# Patient Record
Sex: FEMALE | Race: OTHER | NOT HISPANIC OR LATINO | URBAN - METROPOLITAN AREA
[De-identification: names, ages, dates, MRNs, and addresses within clinical notes are randomized per-mention and may not be internally consistent; named-entity substitution may affect disease eponyms.]

---

## 2017-11-28 ENCOUNTER — EMERGENCY (EMERGENCY)
Facility: HOSPITAL | Age: 57
LOS: 1 days | Discharge: HOME | End: 2017-11-28
Attending: EMERGENCY MEDICINE | Admitting: EMERGENCY MEDICINE
Payer: COMMERCIAL

## 2017-11-28 VITALS
WEIGHT: 169.98 LBS | OXYGEN SATURATION: 98 % | RESPIRATION RATE: 18 BRPM | TEMPERATURE: 99 F | DIASTOLIC BLOOD PRESSURE: 92 MMHG | SYSTOLIC BLOOD PRESSURE: 164 MMHG | HEART RATE: 98 BPM

## 2017-11-28 LAB
ALBUMIN SERPL ELPH-MCNC: 4.4 G/DL — SIGNIFICANT CHANGE UP (ref 3.3–5)
ALP SERPL-CCNC: 99 U/L — SIGNIFICANT CHANGE UP (ref 40–120)
ALT FLD-CCNC: 27 U/L RC — SIGNIFICANT CHANGE UP (ref 10–45)
ANION GAP SERPL CALC-SCNC: 15 MMOL/L — SIGNIFICANT CHANGE UP (ref 5–17)
APTT BLD: 32.6 SEC — SIGNIFICANT CHANGE UP (ref 27.5–37.4)
AST SERPL-CCNC: 9 U/L — LOW (ref 10–40)
BASE EXCESS BLDV CALC-SCNC: 1.4 MMOL/L — SIGNIFICANT CHANGE UP (ref -2–2)
BASOPHILS # BLD AUTO: 0.1 K/UL — SIGNIFICANT CHANGE UP (ref 0–0.2)
BASOPHILS NFR BLD AUTO: 1.5 % — SIGNIFICANT CHANGE UP (ref 0–2)
BILIRUB SERPL-MCNC: 0.1 MG/DL — LOW (ref 0.2–1.2)
BUN SERPL-MCNC: 12 MG/DL — SIGNIFICANT CHANGE UP (ref 7–23)
CA-I SERPL-SCNC: 1.17 MMOL/L — SIGNIFICANT CHANGE UP (ref 1.12–1.3)
CALCIUM SERPL-MCNC: 9 MG/DL — SIGNIFICANT CHANGE UP (ref 8.4–10.5)
CHLORIDE BLDV-SCNC: 106 MMOL/L — SIGNIFICANT CHANGE UP (ref 96–108)
CHLORIDE SERPL-SCNC: 101 MMOL/L — SIGNIFICANT CHANGE UP (ref 96–108)
CK MB BLD-MCNC: 2.5 % — SIGNIFICANT CHANGE UP (ref 0–3.5)
CK MB CFR SERPL CALC: 2.9 NG/ML — SIGNIFICANT CHANGE UP (ref 0–3.8)
CK SERPL-CCNC: 117 U/L — SIGNIFICANT CHANGE UP (ref 25–170)
CO2 BLDV-SCNC: 28 MMOL/L — SIGNIFICANT CHANGE UP (ref 22–30)
CO2 SERPL-SCNC: 23 MMOL/L — SIGNIFICANT CHANGE UP (ref 22–31)
CREAT SERPL-MCNC: 0.66 MG/DL — SIGNIFICANT CHANGE UP (ref 0.5–1.3)
EOSINOPHIL # BLD AUTO: 0.2 K/UL — SIGNIFICANT CHANGE UP (ref 0–0.5)
EOSINOPHIL NFR BLD AUTO: 2.1 % — SIGNIFICANT CHANGE UP (ref 0–6)
GAS PNL BLDV: 137 MMOL/L — SIGNIFICANT CHANGE UP (ref 136–145)
GAS PNL BLDV: SIGNIFICANT CHANGE UP
GLUCOSE BLDV-MCNC: 204 MG/DL — HIGH (ref 70–99)
GLUCOSE SERPL-MCNC: 217 MG/DL — HIGH (ref 70–99)
HCO3 BLDV-SCNC: 26 MMOL/L — SIGNIFICANT CHANGE UP (ref 21–29)
HCT VFR BLD CALC: 39 % — SIGNIFICANT CHANGE UP (ref 34.5–45)
HCT VFR BLDA CALC: 41 % — SIGNIFICANT CHANGE UP (ref 39–50)
HGB BLD CALC-MCNC: 13.4 G/DL — SIGNIFICANT CHANGE UP (ref 11.5–15.5)
HGB BLD-MCNC: 13.7 G/DL — SIGNIFICANT CHANGE UP (ref 11.5–15.5)
INR BLD: 1.03 RATIO — SIGNIFICANT CHANGE UP (ref 0.88–1.16)
LACTATE BLDV-MCNC: 2.6 MMOL/L — HIGH (ref 0.7–2)
LYMPHOCYTES # BLD AUTO: 3.3 K/UL — SIGNIFICANT CHANGE UP (ref 1–3.3)
LYMPHOCYTES # BLD AUTO: 42.6 % — SIGNIFICANT CHANGE UP (ref 13–44)
MCHC RBC-ENTMCNC: 32.5 PG — SIGNIFICANT CHANGE UP (ref 27–34)
MCHC RBC-ENTMCNC: 35.1 GM/DL — SIGNIFICANT CHANGE UP (ref 32–36)
MCV RBC AUTO: 92.6 FL — SIGNIFICANT CHANGE UP (ref 80–100)
MONOCYTES # BLD AUTO: 0.6 K/UL — SIGNIFICANT CHANGE UP (ref 0–0.9)
MONOCYTES NFR BLD AUTO: 7.8 % — SIGNIFICANT CHANGE UP (ref 2–14)
NEUTROPHILS # BLD AUTO: 3.6 K/UL — SIGNIFICANT CHANGE UP (ref 1.8–7.4)
NEUTROPHILS NFR BLD AUTO: 46.1 % — SIGNIFICANT CHANGE UP (ref 43–77)
OTHER CELLS CSF MANUAL: 15 ML/DL — LOW (ref 18–22)
PCO2 BLDV: 45 MMHG — SIGNIFICANT CHANGE UP (ref 35–50)
PH BLDV: 7.39 — SIGNIFICANT CHANGE UP (ref 7.35–7.45)
PLATELET # BLD AUTO: 224 K/UL — SIGNIFICANT CHANGE UP (ref 150–400)
PO2 BLDV: 52 MMHG — HIGH (ref 25–45)
POTASSIUM BLDV-SCNC: 3.7 MMOL/L — SIGNIFICANT CHANGE UP (ref 3.5–5)
POTASSIUM SERPL-MCNC: 3.7 MMOL/L — SIGNIFICANT CHANGE UP (ref 3.5–5.3)
POTASSIUM SERPL-SCNC: 3.7 MMOL/L — SIGNIFICANT CHANGE UP (ref 3.5–5.3)
PROT SERPL-MCNC: 7.1 G/DL — SIGNIFICANT CHANGE UP (ref 6–8.3)
PROTHROM AB SERPL-ACNC: 11.2 SEC — SIGNIFICANT CHANGE UP (ref 9.8–12.7)
RAPID RVP RESULT: SIGNIFICANT CHANGE UP
RBC # BLD: 4.21 M/UL — SIGNIFICANT CHANGE UP (ref 3.8–5.2)
RBC # FLD: 11.9 % — SIGNIFICANT CHANGE UP (ref 10.3–14.5)
SAO2 % BLDV: 84 % — SIGNIFICANT CHANGE UP (ref 67–88)
SODIUM SERPL-SCNC: 139 MMOL/L — SIGNIFICANT CHANGE UP (ref 135–145)
TROPONIN T SERPL-MCNC: <0.01 NG/ML — SIGNIFICANT CHANGE UP (ref 0–0.06)
WBC # BLD: 7.8 K/UL — SIGNIFICANT CHANGE UP (ref 3.8–10.5)
WBC # FLD AUTO: 7.8 K/UL — SIGNIFICANT CHANGE UP (ref 3.8–10.5)

## 2017-11-28 PROCEDURE — 93010 ELECTROCARDIOGRAM REPORT: CPT

## 2017-11-28 PROCEDURE — 71260 CT THORAX DX C+: CPT | Mod: 26

## 2017-11-28 PROCEDURE — 99285 EMERGENCY DEPT VISIT HI MDM: CPT | Mod: 25

## 2017-11-28 RX ORDER — FAMOTIDINE 10 MG/ML
20 INJECTION INTRAVENOUS ONCE
Qty: 0 | Refills: 0 | Status: COMPLETED | OUTPATIENT
Start: 2017-11-28 | End: 2017-11-28

## 2017-11-28 RX ORDER — LIDOCAINE 4 G/100G
10 CREAM TOPICAL ONCE
Qty: 0 | Refills: 0 | Status: COMPLETED | OUTPATIENT
Start: 2017-11-28 | End: 2017-11-28

## 2017-11-28 RX ORDER — CEFTRIAXONE 500 MG/1
1 INJECTION, POWDER, FOR SOLUTION INTRAMUSCULAR; INTRAVENOUS ONCE
Qty: 0 | Refills: 0 | Status: COMPLETED | OUTPATIENT
Start: 2017-11-28 | End: 2017-11-28

## 2017-11-28 RX ORDER — AZITHROMYCIN 500 MG/1
500 TABLET, FILM COATED ORAL ONCE
Qty: 0 | Refills: 0 | Status: COMPLETED | OUTPATIENT
Start: 2017-11-28 | End: 2017-11-28

## 2017-11-28 RX ORDER — SODIUM CHLORIDE 9 MG/ML
1000 INJECTION INTRAMUSCULAR; INTRAVENOUS; SUBCUTANEOUS ONCE
Qty: 0 | Refills: 0 | Status: COMPLETED | OUTPATIENT
Start: 2017-11-28 | End: 2017-11-28

## 2017-11-28 RX ADMIN — FAMOTIDINE 20 MILLIGRAM(S): 10 INJECTION INTRAVENOUS at 22:07

## 2017-11-28 RX ADMIN — Medication 30 MILLILITER(S): at 22:07

## 2017-11-28 RX ADMIN — LIDOCAINE 10 MILLILITER(S): 4 CREAM TOPICAL at 22:06

## 2017-11-28 RX ADMIN — SODIUM CHLORIDE 1000 MILLILITER(S): 9 INJECTION INTRAMUSCULAR; INTRAVENOUS; SUBCUTANEOUS at 22:00

## 2017-11-28 NOTE — ED PROVIDER NOTE - PROGRESS NOTE DETAILS
paged Dr Fields. - Lian Carmona PA-C paged Dr Fields. -Lian Carmona PA-C spoke with taylor who requests admission to hospitalists and call back with ct results on cell [phone] attending Marina: Ct read negative. Discussed with PMD. Will dc with strict return precautions.

## 2017-11-28 NOTE — ED ADULT NURSE NOTE - OBJECTIVE STATEMENT
57 y/o F, no PMH, recently traveled to [facility], presents to ED ambulatory with family for abnormal CXR. Pt reports flu-like sx x 1 week with fevers, cough, runny nose, pt reports cough has been persistent nonproductive, pt now c/o mid sternal/mid upper back pain, 57 y/o F, no PMH, recently traveled to [facility], presents to ED ambulatory with family for abnormal CXR. Pt reports flu-like sx x 1 week with fevers, cough, runny nose, pt reports cough has been persistent nonproductive, pt now c/o mid sternal/mid upper back pain, feels constant and "burning" since Saturday. Pt saw PCP today and got CXR, pt reports CXR showed "mass on apex." Pt reports intermittent SOB. Pt denies headache, dizziness, palpitations, abdominal pain, n/v/d at this time. Family at bedside, safety maintained.

## 2017-11-28 NOTE — ED PROVIDER NOTE - OBJECTIVE STATEMENT
55 y/o otherwise healthy female sent in from PMD for abnormal chest xray with 3 cm apical mass. patient had flu like symptoms last week and reports she went to see PMD for persistent right sided constant chest "burning" and belching. pain is constant. no sob. no fever. no hx lung disease. nonsmoker. no recent travel although patient is from [facility]. no known TB exposure. no weight loss or night sweats. 55 y/o otherwise healthy female sent in from PMD for abnormal chest xray with 3 cm R apical infiltrate vs? mass. patient had flu like symptoms last week and reports she went to see PMD for persistent right sided constant chest "burning" and belching. pain is constant. no sob. no fever. no hx lung disease. nonsmoker. no recent travel although patient is from [facility]. no known TB exposure. no weight loss or night sweats.

## 2017-11-29 VITALS
SYSTOLIC BLOOD PRESSURE: 131 MMHG | OXYGEN SATURATION: 100 % | TEMPERATURE: 98 F | DIASTOLIC BLOOD PRESSURE: 78 MMHG | RESPIRATION RATE: 20 BRPM | HEART RATE: 90 BPM

## 2017-11-29 PROCEDURE — 82803 BLOOD GASES ANY COMBINATION: CPT

## 2017-11-29 PROCEDURE — 84132 ASSAY OF SERUM POTASSIUM: CPT

## 2017-11-29 PROCEDURE — 87581 M.PNEUMON DNA AMP PROBE: CPT

## 2017-11-29 PROCEDURE — 82553 CREATINE MB FRACTION: CPT

## 2017-11-29 PROCEDURE — 96375 TX/PRO/DX INJ NEW DRUG ADDON: CPT | Mod: XU

## 2017-11-29 PROCEDURE — 85730 THROMBOPLASTIN TIME PARTIAL: CPT

## 2017-11-29 PROCEDURE — 84295 ASSAY OF SERUM SODIUM: CPT

## 2017-11-29 PROCEDURE — 87633 RESP VIRUS 12-25 TARGETS: CPT

## 2017-11-29 PROCEDURE — 82330 ASSAY OF CALCIUM: CPT

## 2017-11-29 PROCEDURE — 96374 THER/PROPH/DIAG INJ IV PUSH: CPT | Mod: XU

## 2017-11-29 PROCEDURE — 83605 ASSAY OF LACTIC ACID: CPT

## 2017-11-29 PROCEDURE — 85027 COMPLETE CBC AUTOMATED: CPT

## 2017-11-29 PROCEDURE — 85610 PROTHROMBIN TIME: CPT

## 2017-11-29 PROCEDURE — 87486 CHLMYD PNEUM DNA AMP PROBE: CPT

## 2017-11-29 PROCEDURE — 87798 DETECT AGENT NOS DNA AMP: CPT

## 2017-11-29 PROCEDURE — 99284 EMERGENCY DEPT VISIT MOD MDM: CPT | Mod: 25

## 2017-11-29 PROCEDURE — 93005 ELECTROCARDIOGRAM TRACING: CPT

## 2017-11-29 PROCEDURE — 80053 COMPREHEN METABOLIC PANEL: CPT

## 2017-11-29 PROCEDURE — 86480 TB TEST CELL IMMUN MEASURE: CPT

## 2017-11-29 PROCEDURE — 85014 HEMATOCRIT: CPT

## 2017-11-29 PROCEDURE — 71260 CT THORAX DX C+: CPT

## 2017-11-29 PROCEDURE — 82947 ASSAY GLUCOSE BLOOD QUANT: CPT

## 2017-11-29 PROCEDURE — 82550 ASSAY OF CK (CPK): CPT

## 2017-11-29 PROCEDURE — 84484 ASSAY OF TROPONIN QUANT: CPT

## 2017-11-29 PROCEDURE — 82435 ASSAY OF BLOOD CHLORIDE: CPT

## 2017-11-29 RX ORDER — AZITHROMYCIN 500 MG/1
500 TABLET, FILM COATED ORAL ONCE
Qty: 0 | Refills: 0 | Status: COMPLETED | OUTPATIENT
Start: 2017-11-29 | End: 2017-11-29

## 2017-11-29 RX ORDER — AZITHROMYCIN 500 MG/1
1 TABLET, FILM COATED ORAL
Qty: 3 | Refills: 0 | OUTPATIENT
Start: 2017-11-29

## 2017-11-29 RX ADMIN — AZITHROMYCIN 500 MILLIGRAM(S): 500 TABLET, FILM COATED ORAL at 01:59

## 2017-11-29 RX ADMIN — CEFTRIAXONE 100 GRAM(S): 500 INJECTION, POWDER, FOR SOLUTION INTRAMUSCULAR; INTRAVENOUS at 00:51

## 2017-11-30 LAB
M TB TUBERC IFN-G BLD QL: 0 IU/ML — SIGNIFICANT CHANGE UP
M TB TUBERC IFN-G BLD QL: 0.04 IU/ML — SIGNIFICANT CHANGE UP
M TB TUBERC IFN-G BLD QL: NEGATIVE — SIGNIFICANT CHANGE UP
MITOGEN IGNF BCKGRD COR BLD-ACNC: >10 IU/ML — SIGNIFICANT CHANGE UP

## 2018-11-01 ENCOUNTER — APPOINTMENT (OUTPATIENT)
Dept: INTERNAL MEDICINE | Facility: CLINIC | Age: 58
End: 2018-11-01

## 2018-11-07 ENCOUNTER — APPOINTMENT (OUTPATIENT)
Dept: INTERNAL MEDICINE | Facility: CLINIC | Age: 58
End: 2018-11-07
Payer: COMMERCIAL

## 2018-11-07 VITALS
HEIGHT: 62 IN | TEMPERATURE: 98 F | SYSTOLIC BLOOD PRESSURE: 110 MMHG | RESPIRATION RATE: 14 BRPM | HEART RATE: 87 BPM | WEIGHT: 165 LBS | OXYGEN SATURATION: 98 % | BODY MASS INDEX: 30.36 KG/M2 | DIASTOLIC BLOOD PRESSURE: 80 MMHG

## 2018-11-07 PROCEDURE — 99203 OFFICE O/P NEW LOW 30 MIN: CPT

## 2018-11-07 NOTE — HISTORY OF PRESENT ILLNESS
[FreeTextEntry1] : dm jeremiahal [de-identified] : 58 yo f with dm dx;d 6 mo ago on metformin and ramipril\par Originally from [facility]\par Works as a .  \par Diet is ok, not great.  \par Last labs 6 months ago.  \par Occasional right sided back and neck pains-  never got a diagnosis why.   - hospitalized 6 mo ago for chest pain - carotid doppler  - never had a stress test \par Mammo- not in many years. \par Colonoscopy- once 7 years ago. - not normal - was supposed to go back in one year

## 2018-11-07 NOTE — PLAN
[FreeTextEntry1] : \par dm continue metformin\par cont ace for raas blockade\par strongly encourage a healthier diet,\par \par ate breakfast will do labs at next visit

## 2018-11-08 LAB
ALBUMIN SERPL ELPH-MCNC: 4.2 G/DL
ALP BLD-CCNC: 122 U/L
ALT SERPL-CCNC: 27 U/L
ANION GAP SERPL CALC-SCNC: 14 MMOL/L
APPEARANCE: CLEAR
AST SERPL-CCNC: 17 U/L
BASOPHILS # BLD AUTO: 0.02 K/UL
BASOPHILS NFR BLD AUTO: 0.3 %
BILIRUB SERPL-MCNC: 0.4 MG/DL
BILIRUBIN URINE: NEGATIVE
BLOOD URINE: NEGATIVE
BUN SERPL-MCNC: 15 MG/DL
CALCIUM SERPL-MCNC: 9.4 MG/DL
CHLORIDE SERPL-SCNC: 103 MMOL/L
CHOLEST SERPL-MCNC: 136 MG/DL
CHOLEST/HDLC SERPL: 2.6 RATIO
CO2 SERPL-SCNC: 24 MMOL/L
COLOR: YELLOW
CREAT SERPL-MCNC: 0.52 MG/DL
CREAT SPEC-SCNC: 103 MG/DL
EOSINOPHIL # BLD AUTO: 0.14 K/UL
EOSINOPHIL NFR BLD AUTO: 2.1 %
GLUCOSE QUALITATIVE U: NEGATIVE MG/DL
GLUCOSE SERPL-MCNC: 137 MG/DL
HBA1C MFR BLD HPLC: 7.5 %
HCT VFR BLD CALC: 41.5 %
HCV AB SER QL: NONREACTIVE
HCV S/CO RATIO: 0.07 S/CO
HDLC SERPL-MCNC: 53 MG/DL
HGB BLD-MCNC: 13.7 G/DL
IMM GRANULOCYTES NFR BLD AUTO: 0.1 %
KETONES URINE: NEGATIVE
LDLC SERPL CALC-MCNC: 64 MG/DL
LEUKOCYTE ESTERASE URINE: NEGATIVE
LYMPHOCYTES # BLD AUTO: 2.06 K/UL
LYMPHOCYTES NFR BLD AUTO: 30.6 %
MAN DIFF?: NORMAL
MCHC RBC-ENTMCNC: 30.4 PG
MCHC RBC-ENTMCNC: 33 GM/DL
MCV RBC AUTO: 92 FL
MICROALBUMIN 24H UR DL<=1MG/L-MCNC: <1.2 MG/DL
MICROALBUMIN/CREAT 24H UR-RTO: NORMAL
MONOCYTES # BLD AUTO: 0.53 K/UL
MONOCYTES NFR BLD AUTO: 7.9 %
NEUTROPHILS # BLD AUTO: 3.97 K/UL
NEUTROPHILS NFR BLD AUTO: 59 %
NITRITE URINE: NEGATIVE
PH URINE: 7.5
PLATELET # BLD AUTO: 222 K/UL
POTASSIUM SERPL-SCNC: 4.4 MMOL/L
PROT SERPL-MCNC: 6.7 G/DL
PROTEIN URINE: NEGATIVE MG/DL
RBC # BLD: 4.51 M/UL
RBC # FLD: 13.5 %
SODIUM SERPL-SCNC: 141 MMOL/L
SPECIFIC GRAVITY URINE: 1.02
TRIGL SERPL-MCNC: 95 MG/DL
UROBILINOGEN URINE: 1 MG/DL
WBC # FLD AUTO: 6.73 K/UL

## 2018-11-09 ENCOUNTER — FORM ENCOUNTER (OUTPATIENT)
Age: 58
End: 2018-11-09

## 2018-11-10 ENCOUNTER — APPOINTMENT (OUTPATIENT)
Dept: MAMMOGRAPHY | Facility: IMAGING CENTER | Age: 58
End: 2018-11-10
Payer: COMMERCIAL

## 2018-11-10 ENCOUNTER — OUTPATIENT (OUTPATIENT)
Dept: OUTPATIENT SERVICES | Facility: HOSPITAL | Age: 58
LOS: 1 days | End: 2018-11-10
Payer: COMMERCIAL

## 2018-11-10 DIAGNOSIS — Z00.8 ENCOUNTER FOR OTHER GENERAL EXAMINATION: ICD-10-CM

## 2018-11-10 PROCEDURE — 77063 BREAST TOMOSYNTHESIS BI: CPT | Mod: 26

## 2018-11-10 PROCEDURE — 77067 SCR MAMMO BI INCL CAD: CPT | Mod: 26

## 2018-11-10 PROCEDURE — 77067 SCR MAMMO BI INCL CAD: CPT

## 2018-11-10 PROCEDURE — 77063 BREAST TOMOSYNTHESIS BI: CPT

## 2019-01-16 ENCOUNTER — APPOINTMENT (OUTPATIENT)
Dept: OPHTHALMOLOGY | Facility: CLINIC | Age: 59
End: 2019-01-16
Payer: COMMERCIAL

## 2019-01-16 ENCOUNTER — OTHER (OUTPATIENT)
Age: 59
End: 2019-01-16

## 2019-01-16 DIAGNOSIS — H11.153 PINGUECULA, BILATERAL: ICD-10-CM

## 2019-01-16 DIAGNOSIS — H02.88B MEIBOMIAN GLAND DYSFUNCTION RIGHT EYE, UPPER AND LOWER EYELIDS: ICD-10-CM

## 2019-01-16 DIAGNOSIS — H02.88A MEIBOMIAN GLAND DYSFUNCTION RIGHT EYE, UPPER AND LOWER EYELIDS: ICD-10-CM

## 2019-01-16 PROCEDURE — 92134 CPTRZ OPH DX IMG PST SGM RTA: CPT

## 2019-01-16 PROCEDURE — 92004 COMPRE OPH EXAM NEW PT 1/>: CPT

## 2019-03-01 ENCOUNTER — APPOINTMENT (OUTPATIENT)
Dept: GASTROENTEROLOGY | Facility: CLINIC | Age: 59
End: 2019-03-01
Payer: COMMERCIAL

## 2019-03-01 VITALS
TEMPERATURE: 98 F | HEART RATE: 87 BPM | OXYGEN SATURATION: 97 % | DIASTOLIC BLOOD PRESSURE: 89 MMHG | WEIGHT: 156 LBS | RESPIRATION RATE: 14 BRPM | SYSTOLIC BLOOD PRESSURE: 129 MMHG | BODY MASS INDEX: 28.53 KG/M2

## 2019-03-01 PROCEDURE — 36415 COLL VENOUS BLD VENIPUNCTURE: CPT

## 2019-03-01 PROCEDURE — 99202 OFFICE O/P NEW SF 15 MIN: CPT | Mod: 25

## 2019-03-01 PROCEDURE — 93000 ELECTROCARDIOGRAM COMPLETE: CPT

## 2019-03-01 RX ORDER — RAMIPRIL 1.25 MG/1
1.25 CAPSULE ORAL
Refills: 0 | Status: DISCONTINUED | COMMUNITY
End: 2019-03-01

## 2019-03-01 RX ORDER — OSELTAMIVIR PHOSPHATE 75 MG/1
75 CAPSULE ORAL
Qty: 1 | Refills: 0 | Status: DISCONTINUED | COMMUNITY
Start: 2019-01-22 | End: 2019-03-01

## 2019-03-01 NOTE — CONSULT LETTER
[Dear  ___] : Dear  [unfilled], [( Thank you for referring [unfilled] for consultation for _____ )] : Thank you for referring [unfilled] for consultation for [unfilled] [Please see my note below.] : Please see my note below. [Consult Closing:] : Thank you very much for allowing me to participate in the care of this patient.  If you have any questions, please do not hesitate to contact me. [Sincerely,] : Sincerely, [FreeTextEntry3] : Casi Rubin NP

## 2019-03-01 NOTE — ASSESSMENT
[Encounter for Screening for Malignant Neoplasm of Colon (v76.51\X12.11)] : gangliocytic [FreeTextEntry1] : 58 Y F, from [facility], hx DM, presents today for surveillance colonoscopy average risk. Reports she had a cscope at age 48, but prep was not complete as she did not tolerate oral preparation. \par \par - plan for surveillance cscope; r/b/a/i discussed in detail; will do miralax prep to avoid Golytely taste which she could not tolerate; gave miralax to take days leading up to cscope as well\par - preop labs performed today\par - preop EKG from 2017 scanned in \par \par F/U via letter if normal

## 2019-03-01 NOTE — PHYSICAL EXAM
[General Appearance - Alert] : alert [General Appearance - In No Acute Distress] : in no acute distress [Neck Appearance] : the appearance of the neck was normal [] : no respiratory distress [Bowel Sounds] : normal bowel sounds [Abdomen Soft] : soft [Abnormal Walk] : normal gait [Skin Color & Pigmentation] : normal skin color and pigmentation [Oriented To Time, Place, And Person] : oriented to person, place, and time

## 2019-03-01 NOTE — HISTORY OF PRESENT ILLNESS
[de-identified] : 58 Y F, from [facility], hx DM, presents today for screening colonoscopy. Reports she had a cscope at age 48, but prep was not complete as she did not tolerate oral preparation. \par \par Pt denies any GI complaints. Having 1 BM per day, formed, sometimes strains. No blood in stool. No abdominal pain. No nausea/vomiting. Some reflux, managed with diet. No fevers/chills. + weight loss of ~ 10 lbs in the last year, unsure why. \par \par No family history of CRC or other GI cancer.\par Social hx: denies tobacco use, alcohol use, drug use or frequent NSAID use

## 2019-03-04 ENCOUNTER — OTHER (OUTPATIENT)
Age: 59
End: 2019-03-04

## 2019-03-04 DIAGNOSIS — R94.5 ABNORMAL RESULTS OF LIVER FUNCTION STUDIES: ICD-10-CM

## 2019-03-04 LAB
ALBUMIN SERPL ELPH-MCNC: 4 G/DL
ALP BLD-CCNC: 237 U/L
ALT SERPL-CCNC: 82 U/L
ANION GAP SERPL CALC-SCNC: 15 MMOL/L
AST SERPL-CCNC: 50 U/L
BASOPHILS # BLD AUTO: 0.02 K/UL
BASOPHILS NFR BLD AUTO: 0.4 %
BILIRUB SERPL-MCNC: 0.3 MG/DL
BUN SERPL-MCNC: 16 MG/DL
CALCIUM SERPL-MCNC: 9.5 MG/DL
CHLORIDE SERPL-SCNC: 104 MMOL/L
CO2 SERPL-SCNC: 23 MMOL/L
CREAT SERPL-MCNC: 0.57 MG/DL
EOSINOPHIL # BLD AUTO: 0.22 K/UL
EOSINOPHIL NFR BLD AUTO: 4.4 %
GGT SERPL-CCNC: 195 U/L
GLUCOSE SERPL-MCNC: 161 MG/DL
HCT VFR BLD CALC: 40.8 %
HGB BLD-MCNC: 12.9 G/DL
IMM GRANULOCYTES NFR BLD AUTO: 0.2 %
LYMPHOCYTES # BLD AUTO: 1.17 K/UL
LYMPHOCYTES NFR BLD AUTO: 23.5 %
MAN DIFF?: NORMAL
MCHC RBC-ENTMCNC: 30 PG
MCHC RBC-ENTMCNC: 31.6 GM/DL
MCV RBC AUTO: 94.9 FL
MONOCYTES # BLD AUTO: 0.79 K/UL
MONOCYTES NFR BLD AUTO: 15.9 %
NEUTROPHILS # BLD AUTO: 2.76 K/UL
NEUTROPHILS NFR BLD AUTO: 55.6 %
PLATELET # BLD AUTO: 239 K/UL
POTASSIUM SERPL-SCNC: 4.7 MMOL/L
PROT SERPL-MCNC: 6.6 G/DL
RBC # BLD: 4.3 M/UL
RBC # FLD: 13.2 %
SODIUM SERPL-SCNC: 142 MMOL/L
WBC # FLD AUTO: 4.97 K/UL

## 2019-03-05 LAB
HAV IGM SER QL: NONREACTIVE
HBV CORE IGM SER QL: NONREACTIVE
HBV SURFACE AG SER QL: NONREACTIVE
HCV AB SER QL: NONREACTIVE
HCV S/CO RATIO: 0.08 S/CO

## 2019-03-06 LAB
CERULOPLASMIN SERPL-MCNC: 29 MG/DL
MITOCHONDRIA AB SER IF-ACNC: NORMAL
SMOOTH MUSCLE AB SER QL IF: NORMAL

## 2019-03-07 ENCOUNTER — APPOINTMENT (OUTPATIENT)
Dept: ULTRASOUND IMAGING | Facility: HOSPITAL | Age: 59
End: 2019-03-07
Payer: COMMERCIAL

## 2019-03-07 ENCOUNTER — OUTPATIENT (OUTPATIENT)
Dept: OUTPATIENT SERVICES | Facility: HOSPITAL | Age: 59
LOS: 1 days | End: 2019-03-07
Payer: COMMERCIAL

## 2019-03-07 PROCEDURE — 76700 US EXAM ABDOM COMPLETE: CPT | Mod: 26

## 2019-03-07 PROCEDURE — 76700 US EXAM ABDOM COMPLETE: CPT

## 2019-03-12 ENCOUNTER — APPOINTMENT (OUTPATIENT)
Dept: INTERNAL MEDICINE | Facility: CLINIC | Age: 59
End: 2019-03-12
Payer: COMMERCIAL

## 2019-03-12 ENCOUNTER — LABORATORY RESULT (OUTPATIENT)
Age: 59
End: 2019-03-12

## 2019-03-12 VITALS
HEART RATE: 84 BPM | SYSTOLIC BLOOD PRESSURE: 128 MMHG | HEIGHT: 62 IN | DIASTOLIC BLOOD PRESSURE: 84 MMHG | OXYGEN SATURATION: 98 % | RESPIRATION RATE: 14 BRPM | WEIGHT: 151.05 LBS | BODY MASS INDEX: 27.8 KG/M2 | TEMPERATURE: 97.7 F

## 2019-03-12 PROCEDURE — 99214 OFFICE O/P EST MOD 30 MIN: CPT

## 2019-03-12 PROCEDURE — 36415 COLL VENOUS BLD VENIPUNCTURE: CPT

## 2019-03-12 NOTE — PHYSICAL EXAM
[No Acute Distress] : no acute distress [Well Nourished] : well nourished [Well Developed] : well developed [Normal Oropharynx] : the oropharynx was normal [Supple] : supple [No Lymphadenopathy] : no lymphadenopathy [No Respiratory Distress] : no respiratory distress  [Clear to Auscultation] : lungs were clear to auscultation bilaterally [No Accessory Muscle Use] : no accessory muscle use [Normal Rate] : normal rate  [Regular Rhythm] : with a regular rhythm [Normal S1, S2] : normal S1 and S2 [Soft] : abdomen soft [Non Tender] : non-tender [Non-distended] : non-distended [No HSM] : no HSM [Normal Bowel Sounds] : normal bowel sounds [Normal Gait] : normal gait [No Focal Deficits] : no focal deficits [Normal Affect] : the affect was normal [Normal Insight/Judgement] : insight and judgment were intact

## 2019-03-12 NOTE — HISTORY OF PRESENT ILLNESS
[de-identified] : Losing weight and mouth is dry. Pain in chest with coughing.- right side.This pain was worse last week.  Reports she was hospitalized one year ago for CP in the past - MRI performed...told to f/up 6 months but never did.\par Occasional cramping of hands- mag supplemnet helps.  \par Has lost 15 lbs.  Recent FS's in the 200 range.\par Some issues with train- confused about direction sometimes.- This occurred one month ago.\par

## 2019-03-12 NOTE — PLAN
[FreeTextEntry1] : Weight loss and Dry mouth- likely related to diabetes  -check labs today\par confusion - referral for neurology\par continue metformin\par Pleuritic CP- send for CXR

## 2019-03-14 ENCOUNTER — OTHER (OUTPATIENT)
Age: 59
End: 2019-03-14

## 2019-03-18 ENCOUNTER — FORM ENCOUNTER (OUTPATIENT)
Age: 59
End: 2019-03-18

## 2019-03-19 ENCOUNTER — APPOINTMENT (OUTPATIENT)
Dept: GASTROENTEROLOGY | Facility: HOSPITAL | Age: 59
End: 2019-03-19

## 2019-03-19 ENCOUNTER — OUTPATIENT (OUTPATIENT)
Dept: OUTPATIENT SERVICES | Facility: HOSPITAL | Age: 59
LOS: 1 days | End: 2019-03-19
Payer: COMMERCIAL

## 2019-03-19 ENCOUNTER — OUTPATIENT (OUTPATIENT)
Dept: OUTPATIENT SERVICES | Facility: HOSPITAL | Age: 59
LOS: 1 days | Discharge: HOME | End: 2019-03-19
Payer: COMMERCIAL

## 2019-03-19 LAB
FERRITIN SERPL-MCNC: 218 NG/ML
GLUCOSE BLDC GLUCOMTR-MCNC: 156 MG/DL — HIGH (ref 70–99)
IRON SATN MFR SERPL: 25 %
IRON SERPL-MCNC: 70 UG/DL
MPO AB + PR3 PNL SER: NORMAL
TIBC SERPL-MCNC: 284 UG/DL
TRANSFERRIN SERPL-MCNC: 230 MG/DL
UIBC SERPL-MCNC: 214 UG/DL

## 2019-03-19 PROCEDURE — 45378 DIAGNOSTIC COLONOSCOPY: CPT

## 2019-03-19 PROCEDURE — 45378 DIAGNOSTIC COLONOSCOPY: CPT | Mod: GC

## 2019-03-19 PROCEDURE — 71046 X-RAY EXAM CHEST 2 VIEWS: CPT | Mod: 26

## 2019-03-19 PROCEDURE — 82962 GLUCOSE BLOOD TEST: CPT

## 2019-03-19 PROCEDURE — 71046 X-RAY EXAM CHEST 2 VIEWS: CPT

## 2019-03-21 LAB
ALBUMIN SERPL ELPH-MCNC: 4.4 G/DL
ALP BLD-CCNC: 300 U/L
ALT SERPL-CCNC: 88 U/L
ANA SER IF-ACNC: NEGATIVE
ANION GAP SERPL CALC-SCNC: 12 MMOL/L
AST SERPL-CCNC: 51 U/L
BASOPHILS # BLD AUTO: 0.02 K/UL
BASOPHILS NFR BLD AUTO: 0.4 %
BILIRUB SERPL-MCNC: 0.4 MG/DL
BUN SERPL-MCNC: 13 MG/DL
CALCIUM SERPL-MCNC: 9.6 MG/DL
CHLORIDE SERPL-SCNC: 101 MMOL/L
CO2 SERPL-SCNC: 25 MMOL/L
CREAT SERPL-MCNC: 0.59 MG/DL
EOSINOPHIL # BLD AUTO: 0.15 K/UL
EOSINOPHIL NFR BLD AUTO: 2.7 %
GGT SERPL-CCNC: 270 U/L
GLUCOSE SERPL-MCNC: 128 MG/DL
HBA1C MFR BLD HPLC: 7.9 %
HCT VFR BLD CALC: 45.4 %
HGB BLD-MCNC: 14 G/DL
IMM GRANULOCYTES NFR BLD AUTO: 0.2 %
LYMPHOCYTES # BLD AUTO: 1.42 K/UL
LYMPHOCYTES NFR BLD AUTO: 25.8 %
MAN DIFF?: NORMAL
MCHC RBC-ENTMCNC: 29.7 PG
MCHC RBC-ENTMCNC: 30.8 GM/DL
MCV RBC AUTO: 96.2 FL
MONOCYTES # BLD AUTO: 0.9 K/UL
MONOCYTES NFR BLD AUTO: 16.4 %
NEUTROPHILS # BLD AUTO: 3 K/UL
NEUTROPHILS NFR BLD AUTO: 54.5 %
PLATELET # BLD AUTO: 211 K/UL
POTASSIUM SERPL-SCNC: 4.8 MMOL/L
PROT SERPL-MCNC: 7 G/DL
RBC # BLD: 4.72 M/UL
RBC # FLD: 13.9 %
SODIUM SERPL-SCNC: 138 MMOL/L
TSH SERPL-ACNC: 1.45 UIU/ML
WBC # FLD AUTO: 5.5 K/UL

## 2019-03-26 ENCOUNTER — FORM ENCOUNTER (OUTPATIENT)
Age: 59
End: 2019-03-26

## 2019-03-27 ENCOUNTER — APPOINTMENT (OUTPATIENT)
Dept: CT IMAGING | Facility: HOSPITAL | Age: 59
End: 2019-03-27
Payer: COMMERCIAL

## 2019-03-27 ENCOUNTER — OUTPATIENT (OUTPATIENT)
Dept: OUTPATIENT SERVICES | Facility: HOSPITAL | Age: 59
LOS: 1 days | End: 2019-03-27
Payer: MEDICAID

## 2019-03-27 PROCEDURE — 71260 CT THORAX DX C+: CPT | Mod: 26

## 2019-03-27 PROCEDURE — 71260 CT THORAX DX C+: CPT

## 2019-03-28 ENCOUNTER — FORM ENCOUNTER (OUTPATIENT)
Age: 59
End: 2019-03-28

## 2019-03-29 ENCOUNTER — APPOINTMENT (OUTPATIENT)
Dept: ULTRASOUND IMAGING | Facility: HOSPITAL | Age: 59
End: 2019-03-29

## 2019-03-29 ENCOUNTER — MESSAGE (OUTPATIENT)
Age: 59
End: 2019-03-29

## 2019-03-29 ENCOUNTER — OUTPATIENT (OUTPATIENT)
Dept: OUTPATIENT SERVICES | Facility: HOSPITAL | Age: 59
LOS: 1 days | End: 2019-03-29
Payer: COMMERCIAL

## 2019-03-29 PROCEDURE — 76536 US EXAM OF HEAD AND NECK: CPT

## 2019-03-29 PROCEDURE — 76536 US EXAM OF HEAD AND NECK: CPT | Mod: 26

## 2019-04-04 ENCOUNTER — APPOINTMENT (OUTPATIENT)
Dept: OTOLARYNGOLOGY | Facility: CLINIC | Age: 59
End: 2019-04-04
Payer: COMMERCIAL

## 2019-04-04 VITALS
HEART RATE: 97 BPM | WEIGHT: 151 LBS | BODY MASS INDEX: 27.79 KG/M2 | SYSTOLIC BLOOD PRESSURE: 112 MMHG | HEIGHT: 62 IN | DIASTOLIC BLOOD PRESSURE: 91 MMHG

## 2019-04-04 VITALS
HEART RATE: 97 BPM | HEIGHT: 62 IN | SYSTOLIC BLOOD PRESSURE: 112 MMHG | DIASTOLIC BLOOD PRESSURE: 91 MMHG | WEIGHT: 151 LBS | BODY MASS INDEX: 27.79 KG/M2

## 2019-04-04 DIAGNOSIS — Z87.442 PERSONAL HISTORY OF URINARY CALCULI: ICD-10-CM

## 2019-04-04 DIAGNOSIS — Z12.11 ENCOUNTER FOR SCREENING FOR MALIGNANT NEOPLASM OF COLON: ICD-10-CM

## 2019-04-04 DIAGNOSIS — Z83.3 FAMILY HISTORY OF DIABETES MELLITUS: ICD-10-CM

## 2019-04-04 DIAGNOSIS — Z78.9 OTHER SPECIFIED HEALTH STATUS: ICD-10-CM

## 2019-04-04 PROCEDURE — 99204 OFFICE O/P NEW MOD 45 MIN: CPT | Mod: 25

## 2019-04-04 PROCEDURE — 31575 DIAGNOSTIC LARYNGOSCOPY: CPT

## 2019-04-07 PROBLEM — Z87.442 HISTORY OF KIDNEY STONES: Status: RESOLVED | Noted: 2019-04-07 | Resolved: 2019-04-07

## 2019-04-07 NOTE — ASSESSMENT
[FreeTextEntry1] : Ms. Branch was evaluated for the following issues today:\par \par 1.) Dependent nasal congestion is due to inferior turbinate hypertrophy.  Evidence of partial left inferior turbinate resection, probably done when she was a child.\par 2.) Deviated nasal septum also narrows left nasal airway\par 3.) Reflux is currently not controlled\par 4.) Multiple thyroid nodules/cysts, with dominant 1.9 cm complex nodule on left side \par 5.) Possible left tail of parotid mass\par 6.) Mediastinal and hilar lymph nodes - no palpable lymph nodes in neck, except for maybe left tail parotid\par \par PLAN:\par - fluticasone nasal spray \par - reflux precations\par - pantoprazole\par - USG FNA of left thyroid nodule to ensure that solid portion is sampled and mary specimen is adequate\par - US neck to assess left parotid area\par \par Return after US and USG FNA\par

## 2019-04-07 NOTE — PHYSICAL EXAM
[Nasal Endoscopy Performed] : nasal endoscopy was performed, see procedure section for findings [Midline] : trachea located in midline position [Normal] : no rashes [Laryngoscopy Performed] : laryngoscopy was performed, see procedure section for findings [FreeTextEntry1] : No hoarseness. [de-identified] : Indistinct 1 x 2 cm tail of parotid firmness/mass, nontender. [de-identified] : Fullness left thyroid gland. [de-identified] : 1+ bilateral  [de-identified] : See NECK for left parotid gland.  Right paroitd and bilateral submandibular glands normal. [de-identified] : Carotid pulses 2+ bilateral.

## 2019-04-07 NOTE — PROCEDURE
[FreeTextEntry6] : \par Indication: nasal obstruction\par -Verbal consent was obtained from patient prior to exam. \par - Ar-Synephrine spray applied to nose bilaterally.\par Nasal endoscopy was performed with flexible scope.\par Findings: \par -- Inferior turbinate  edematous  bilateral but left anterior head has been resected.  Inferior meatus clear bilateral.\par -- Septum was deviated to the left and spur is touching left inferior turbinate.\par -- No polyps either side nose\par -- Mucus clear bilateral\par -- Middle and superior turbinates normal bilateral\par -- Middle meatus clear bilateral.  SER clear bilateral.\par -- Nasopharynx without mass or exudate\par -- Adenoids were previously resected\par -- Eustachian orifices were clear bilateral\par \par The patient tolerated the procedure well.\par \par  [de-identified] : \par Indication:  Reflux\par -Verbal consent was obtained from patient prior to procedure.\par -Ar-Synephrine spray applied to the nasal cavities.\par Flexible laryngoscopy was performed via right nostril and revealed the following:\par   -- Nasopharynx had no mass or exudate.  s/p adenoidectomy\par   -- Base of tongue was symmetric and not enlarged.\par   -- Vallecula was clear\par   -- Right retropharyngeal bulge c/w carotid artery.\par   -- Epiglottis, both aryepiglottic folds and both false vocal folds were normal\par   -- Arytenoids both with moderate edema and erythema \par   -- True vocal folds were fully mobile and without lesions. \par   -- Post cricoid area was congested.  Thick clear mucus in hypopharynx.\par   -- Interarytenoid edema was present.\par \par The patient tolerated the procedure well.\par

## 2019-04-07 NOTE — REVIEW OF SYSTEMS
[Patient Intake Form Reviewed] : Patient intake form was reviewed [As Noted in HPI] : as noted in HPI [Chest Pain] : chest pain [Heartburn] : heartburn [Swollen Glands] : swollen glands [Negative] : Endocrine [Abdominal Pain] : no abdominal pain [FreeTextEntry6] : chest congestion [FreeTextEntry9] : back pain [de-identified] : headache, memory loss

## 2019-04-07 NOTE — HISTORY OF PRESENT ILLNESS
[de-identified] : I was pleased to evaluate this 58 year old woman who was referred by Dr. Olivera for nasal blockage and thyroid nodule.\par \par Ms. Branch c/o dependent nasal obstruction when sleeping for past year.\par Since childhood, has nasal congestion.  After felt like had a  "ball" in nose as child, had procedure that resulted in resolution of the "ball" sensation.\par No nasal trauma.  Has not tried any medications for nasal breathing.\par When has a URI, gets lot of postnasal drip.\par No known inhalant allergies.  No pets at home.\par \par Mouth gets very dry easily.  No dry eyes.  Voice sometimes sounds heavy.\par Often has to clear throat.  For past few years, has heartburn, not controlled; occurs suddenly; causes dry cough.  No abdominal pain.\par Often gets heaviness on right side of head, behind the ear.  Is forgetful.  Has consultation appt with Neuro.\par \par Thyroid nodules noted on recent CT chest, done to evaluate chest mass.\par No compressive symptoms, difficulty breathing, difficulty swallowing or voice change \par Euthyroid.\par Family history negative for thyroid disease or cancer\par No history of radiation other than imaging.  \par \par \par The medical records were reviewed and include the following:\par US THYROID (3/29/2019) at [facility]:\par - RIGHT thyroid lobe   4.5 x 1.0 x 1.9  cm, homogeneous, with 3 cysts\par     -- 0.2 x 0.2 x 0.2 cm cyst in midpole\par     -- 0.4 x 0.3 x 0.4 cm complex cyst with internal septations and debris in mid pole\par     -- 0.4 x 0.2 x 0.3 cm cyst with internal septation in the lower pole adjacent to isthmus\par - LEFT thyroid lobe  4.9 x 1.1 x 1.7 cm, homogeneous\par     -- 0.5 x 0.3 x 0.4 cm cyst in upper pole\par     -- 1.9 x 0.7 x 0.8 cm complex cystic and solid, including eccentric anterior solid component measuring 1.7 cm, hypoechoic; no microcalcifications\par - ISTHMUS 0.2 cm with no nodules\par - CERVICAL LYMPH NODES:  No abnormal lymph nodes identified in neck\par - PARATHYROID GLANDS:  Not visualized\par \par CT CHEST with iv contrast (3/27/2019) at [facility]:\par -- Comparison to CT 11/28/2017\par -- Interval development of significant mediastinal and bilateral hilar lymphadenopathy.  \par      -- Right paratracheal lymph node, 1.9 cm. \par      -- Enlarged subcarinal lymph node, 1.8 cm\par      -- Enlarged bilateral hilar lymph nodes, up to 1.8 cm on right\par      -- No significant axillary adenopathy.\par - Biapical scarring, 5 mm nodule in RLL (previously 4 mm)\par - Enlarged paraesophageal lymph node , 1.1 cm\par - Few bilateral subcm hypodense thyroid nodules, up to 0.9 cm\par - Otherwise unremarkable\par (Images were reviewed.) \par \par \par LABS \par (3/12/2019) - TSH 1.45\par

## 2019-04-07 NOTE — CONSULT LETTER
[Dear  ___] : Dear  [unfilled], [Consult Letter:] : I had the pleasure of evaluating your patient, [unfilled]. [Consult Closing:] : Thank you very much for allowing me to participate in the care of this patient.  If you have any questions, please do not hesitate to contact me. [Sincerely,] : Sincerely, [DrCasandra  ___] : Dr. MELLO [DrCasandra ___] : Dr. MELLO [FreeTextEntry2] : Deepak Olivera M.D.\[address]\[place] [address] [FreeTextEntry1] : \par \par Enclosed please find my office notes for April 4, 2019. \par \par  [FreeTextEntry3] : \par Shari Parham MD \par Otolaryngology, Head and Neck Surgery \par Residency site , [facility] \par

## 2019-04-09 ENCOUNTER — FORM ENCOUNTER (OUTPATIENT)
Age: 59
End: 2019-04-09

## 2019-04-10 ENCOUNTER — OUTPATIENT (OUTPATIENT)
Dept: OUTPATIENT SERVICES | Facility: HOSPITAL | Age: 59
LOS: 1 days | End: 2019-04-10
Payer: MEDICAID

## 2019-04-10 ENCOUNTER — APPOINTMENT (OUTPATIENT)
Dept: CT IMAGING | Facility: HOSPITAL | Age: 59
End: 2019-04-10
Payer: COMMERCIAL

## 2019-04-10 ENCOUNTER — APPOINTMENT (OUTPATIENT)
Dept: HEMATOLOGY ONCOLOGY | Facility: CLINIC | Age: 59
End: 2019-04-10
Payer: COMMERCIAL

## 2019-04-10 ENCOUNTER — TRANSCRIPTION ENCOUNTER (OUTPATIENT)
Age: 59
End: 2019-04-10

## 2019-04-10 VITALS
DIASTOLIC BLOOD PRESSURE: 88 MMHG | TEMPERATURE: 97.6 F | SYSTOLIC BLOOD PRESSURE: 133 MMHG | HEART RATE: 84 BPM | BODY MASS INDEX: 26.89 KG/M2 | RESPIRATION RATE: 14 BRPM | WEIGHT: 147 LBS | OXYGEN SATURATION: 100 %

## 2019-04-10 DIAGNOSIS — J98.59 OTHER DISEASES OF MEDIASTINUM, NOT ELSEWHERE CLASSIFIED: ICD-10-CM

## 2019-04-10 PROCEDURE — 36415 COLL VENOUS BLD VENIPUNCTURE: CPT

## 2019-04-10 PROCEDURE — 74177 CT ABD & PELVIS W/CONTRAST: CPT | Mod: 26

## 2019-04-10 PROCEDURE — 74177 CT ABD & PELVIS W/CONTRAST: CPT

## 2019-04-10 PROCEDURE — 99204 OFFICE O/P NEW MOD 45 MIN: CPT | Mod: 25

## 2019-04-10 NOTE — REASON FOR VISIT
[Family Member] : family member [Blood Count Assessment] : blood count assessment [Initial Consultation] : an initial consultation for

## 2019-04-11 ENCOUNTER — MESSAGE (OUTPATIENT)
Age: 59
End: 2019-04-11

## 2019-04-12 ENCOUNTER — OUTPATIENT (OUTPATIENT)
Dept: OUTPATIENT SERVICES | Facility: HOSPITAL | Age: 59
LOS: 1 days | End: 2019-04-12
Payer: MEDICAID

## 2019-04-12 ENCOUNTER — OUTPATIENT (OUTPATIENT)
Dept: OUTPATIENT SERVICES | Facility: HOSPITAL | Age: 59
LOS: 1 days | End: 2019-04-12
Payer: COMMERCIAL

## 2019-04-12 ENCOUNTER — APPOINTMENT (OUTPATIENT)
Age: 59
End: 2019-04-12
Payer: COMMERCIAL

## 2019-04-12 DIAGNOSIS — Z01.818 ENCOUNTER FOR OTHER PREPROCEDURAL EXAMINATION: ICD-10-CM

## 2019-04-12 LAB
APTT BLD: 32.9 SEC — SIGNIFICANT CHANGE UP (ref 27.5–36.3)
GLUCOSE BLDC GLUCOMTR-MCNC: 146 MG/DL — HIGH (ref 70–99)
INR BLD: 0.98 — SIGNIFICANT CHANGE UP (ref 0.88–1.16)
PROTHROM AB SERPL-ACNC: 11.1 SEC — SIGNIFICANT CHANGE UP (ref 10–12.9)

## 2019-04-12 PROCEDURE — 85730 THROMBOPLASTIN TIME PARTIAL: CPT

## 2019-04-12 PROCEDURE — 82962 GLUCOSE BLOOD TEST: CPT

## 2019-04-12 PROCEDURE — 93010 ELECTROCARDIOGRAM REPORT: CPT

## 2019-04-12 PROCEDURE — 85610 PROTHROMBIN TIME: CPT

## 2019-04-12 PROCEDURE — A9552: CPT

## 2019-04-12 PROCEDURE — 78815 PET IMAGE W/CT SKULL-THIGH: CPT | Mod: 26

## 2019-04-12 PROCEDURE — 78815 PET IMAGE W/CT SKULL-THIGH: CPT

## 2019-04-12 PROCEDURE — 93005 ELECTROCARDIOGRAM TRACING: CPT

## 2019-04-12 PROCEDURE — 99205 OFFICE O/P NEW HI 60 MIN: CPT

## 2019-04-12 NOTE — PHYSICAL EXAM
[General Appearance - Alert] : alert [] : no respiratory distress [Respiration, Rhythm And Depth] : normal respiratory rhythm and effort [Exaggerated Use Of Accessory Muscles For Inspiration] : no accessory muscle use [Apical Impulse] : the apical impulse was normal [Heart Rate And Rhythm] : heart rate was normal and rhythm regular [Heart Sounds] : normal S1 and S2 [2+] : left 2+ [Abnormal Walk] : normal gait [Oriented To Time, Place, And Person] : oriented to person, place, and time

## 2019-04-15 ENCOUNTER — APPOINTMENT (OUTPATIENT)
Dept: HEART AND VASCULAR | Facility: CLINIC | Age: 59
End: 2019-04-15
Payer: COMMERCIAL

## 2019-04-15 VITALS
RESPIRATION RATE: 14 BRPM | DIASTOLIC BLOOD PRESSURE: 86 MMHG | TEMPERATURE: 97.3 F | OXYGEN SATURATION: 97 % | HEART RATE: 108 BPM | HEIGHT: 63 IN | BODY MASS INDEX: 26.41 KG/M2 | WEIGHT: 149.05 LBS | SYSTOLIC BLOOD PRESSURE: 114 MMHG

## 2019-04-15 VITALS — HEART RATE: 90 BPM

## 2019-04-15 PROCEDURE — 93880 EXTRACRANIAL BILAT STUDY: CPT

## 2019-04-15 PROCEDURE — 93306 TTE W/DOPPLER COMPLETE: CPT

## 2019-04-15 PROCEDURE — 99244 OFF/OP CNSLTJ NEW/EST MOD 40: CPT

## 2019-04-15 PROCEDURE — 93000 ELECTROCARDIOGRAM COMPLETE: CPT

## 2019-04-15 NOTE — PHYSICAL EXAM
[General Appearance - Well Developed] : well developed [Normal Appearance] : normal appearance [Well Groomed] : well groomed [General Appearance - Well Nourished] : well nourished [No Deformities] : no deformities [Normal Conjunctiva] : the conjunctiva exhibited no abnormalities [General Appearance - In No Acute Distress] : no acute distress [Heart Sounds] : normal S1 and S2 [Heart Rate And Rhythm] : heart rate and rhythm were normal [Exaggerated Use Of Accessory Muscles For Inspiration] : no accessory muscle use [Respiration, Rhythm And Depth] : normal respiratory rhythm and effort [Abdomen Soft] : soft [Auscultation Breath Sounds / Voice Sounds] : lungs were clear to auscultation bilaterally [Abdomen Tenderness] : non-tender [Abdomen Mass (___ Cm)] : no abdominal mass palpated [] : no hepato-splenomegaly [Oriented To Time, Place, And Person] : oriented to person, place, and time [Skin Turgor] : normal skin turgor [Abnormal Walk] : normal gait [Mood] : the mood was normal [Affect] : the affect was normal [No Anxiety] : not feeling anxious

## 2019-04-15 NOTE — ASSESSMENT
[FreeTextEntry1] : 58 yr old female with recent weight loss, fatigue and dizziness presents today to discuss mediastinal lymphadenopathy. \par \par CT chest and abdomen were reviewed with the patient. There are several areas of mediastinal lymphadenopathy with differential diagnosis being sarcoidosis, lymphoma or infectious. Her prior CT from one year ago was compared to current and did not reveal mediastinal lymphadenopathy at that time. Slight interval increase from 4mm to 5mm of the Right lower lobe lung nodule. \par \par Her CT chest also revealed thyroid nodules that will be biopsied on Wednesday with IR. I discussed that if surgical intervention is needed of the thyroid then I will discuss with Dr. Parham prior to possible mediastinoscopy. \par \par I plan on an urgent PET CT and MRI brain to further evaluate the extent of disease and possible metastatic disease. \par \par I will plan on an EBUS if this reveals evidence of carcinoma or sarcoidosis then that will be sufficient. However, if lymphocytes are present than a mediastinoscopy will be performed. \par \par Plan:\par 1. PET CT \par 2. MRI brain\par 3. EBUS with biopsy and mediastinoscopy with biopsy \par

## 2019-04-15 NOTE — DISCUSSION/SUMMARY
[FreeTextEntry1] : At the time of the patient's visit a Carotid Doppler was performed to evaluate for PVD\par \par At the time of the patient's visit an Echocardiogram was performed to evaluate her LV function. \par \par At the time of the visit the results were reviewed with patient \par \par I informed the patient that there is no Cardiovascular contraindication to the proposed surgery at this time. At the time of her visit we completed her preop questionnaire which I reviewed with Dr Olivera. PT PTT and urinalysis was sent

## 2019-04-15 NOTE — HISTORY OF PRESENT ILLNESS
[FreeTextEntry1] : 58 year female who reports coming for Cardiology evaluation prior to surgery for a chest biopsy to evaluate her swollen LNs 2 days from now. She describes feeling pain inside her chest. She recalls having a CXR 1 year ago which prompted a CT scan. She did not followup her CT until recently when her pain returned. She reports being followed by ENT as well. He pain was constant in the past. When it returned it is again constant. She denies having any associated dizziness, lightheadedness or palpitations. She does have a sense of loss of direction and plans to have an MRI. She was told that no labs were needed at this time, having already been recently performed

## 2019-04-15 NOTE — HISTORY OF PRESENT ILLNESS
[FreeTextEntry1] : 58 yr old female, nonsmoker with a PMH of DM II. She reported recent weight loss (total of 20lbs since September) and fatigue since February. She has also complained of mild headaches and dizziness for the last month. She was evaluated by her PCP and was sent for a CT chest/abdomen/pelvis which revealed mediastinal adenopathy. She was referred to Dr. Weller and felt that a biopsy of the mediastinal lymph nodes is necessary. \par \par CT chest completed on 03/27/19:\par -interval development of significant mediastinal and bilateral hilar lymphadenopathy \par -right paratracheal lymph node is measuring 1.9cm\par -enlarged subcarinal lymph node measuring 1.8cm\par -enlarged bilateral hilar lymph nodes measuring 1.8cm \par \par CT abdomen/pelvis: 04/10/19\par - gastroesophageal junction adenopathy \par \par

## 2019-04-16 ENCOUNTER — APPOINTMENT (OUTPATIENT)
Dept: OTOLARYNGOLOGY | Facility: CLINIC | Age: 59
End: 2019-04-16
Payer: COMMERCIAL

## 2019-04-16 VITALS
HEIGHT: 64 IN | SYSTOLIC BLOOD PRESSURE: 155 MMHG | RESPIRATION RATE: 16 BRPM | TEMPERATURE: 98 F | DIASTOLIC BLOOD PRESSURE: 92 MMHG | OXYGEN SATURATION: 98 % | HEART RATE: 81 BPM | WEIGHT: 145.28 LBS

## 2019-04-16 LAB
25(OH)D3 SERPL-MCNC: 10.8 NG/ML
ALBUMIN MFR SERPL ELPH: 58.3 %
ALBUMIN SERPL ELPH-MCNC: 4.2 G/DL
ALBUMIN SERPL-MCNC: 4 G/DL
ALBUMIN/GLOB SERPL: 1.4 RATIO
ALP BLD-CCNC: 419 U/L
ALPHA1 GLOB MFR SERPL ELPH: 4.4 %
ALPHA1 GLOB SERPL ELPH-MCNC: 0.3 G/DL
ALPHA2 GLOB MFR SERPL ELPH: 8.8 %
ALPHA2 GLOB SERPL ELPH-MCNC: 0.6 G/DL
ALT SERPL-CCNC: 91 U/L
ANION GAP SERPL CALC-SCNC: 13 MMOL/L
APPEARANCE: CLEAR
APTT BLD: 36.2 SEC
AST SERPL-CCNC: 49 U/L
B-GLOBULIN MFR SERPL ELPH: 12.7 %
B-GLOBULIN SERPL ELPH-MCNC: 0.9 G/DL
B2 MICROGLOB SERPL-MCNC: 3.5 MG/L
BASOPHILS # BLD AUTO: 0.02 K/UL
BASOPHILS NFR BLD AUTO: 0.4 %
BILIRUB SERPL-MCNC: 0.4 MG/DL
BILIRUBIN URINE: NEGATIVE
BLOOD URINE: NEGATIVE
BUN SERPL-MCNC: 16 MG/DL
CALCIUM SERPL-MCNC: 9.8 MG/DL
CHLORIDE SERPL-SCNC: 101 MMOL/L
CO2 SERPL-SCNC: 27 MMOL/L
COLOR: NORMAL
CREAT SERPL-MCNC: 0.61 MG/DL
DEPRECATED KAPPA LC FREE/LAMBDA SER: 1.06 RATIO
EOSINOPHIL # BLD AUTO: 0.17 K/UL
EOSINOPHIL NFR BLD AUTO: 3.5 %
ERYTHROCYTE [SEDIMENTATION RATE] IN BLOOD BY WESTERGREN METHOD: 3 MM/HR
GAMMA GLOB FLD ELPH-MCNC: 1.1 G/DL
GAMMA GLOB MFR SERPL ELPH: 15.8 %
GLUCOSE QUALITATIVE U: NEGATIVE
GLUCOSE SERPL-MCNC: 147 MG/DL
HAPTOGLOB SERPL-MCNC: 113 MG/DL
HBV CORE IGG+IGM SER QL: REACTIVE
HBV SURFACE AB SER QL: REACTIVE
HBV SURFACE AG SER QL: NONREACTIVE
HCT VFR BLD CALC: 41 %
HCV AB SER QL: NONREACTIVE
HCV S/CO RATIO: 0.05 S/CO
HGB BLD-MCNC: 13.4 G/DL
IGA SER QL IEP: 187 MG/DL
IGG SER QL IEP: 1083 MG/DL
IGM SER QL IEP: 57 MG/DL
IMM GRANULOCYTES NFR BLD AUTO: 0.2 %
INR PPP: 0.93 RATIO
INTERPRETATION SERPL IEP-IMP: NORMAL
KAPPA LC CSF-MCNC: 2.19 MG/DL
KAPPA LC SERPL-MCNC: 2.32 MG/DL
KETONES URINE: NEGATIVE
LDH SERPL-CCNC: 233 U/L
LEUKOCYTE ESTERASE URINE: NEGATIVE
LYMPHOCYTES # BLD AUTO: 1.16 K/UL
LYMPHOCYTES NFR BLD AUTO: 23.8 %
M PROTEIN SPEC IFE-MCNC: NORMAL
MAN DIFF?: NORMAL
MCHC RBC-ENTMCNC: 30.1 PG
MCHC RBC-ENTMCNC: 32.7 GM/DL
MCV RBC AUTO: 92.1 FL
MONOCYTES # BLD AUTO: 0.63 K/UL
MONOCYTES NFR BLD AUTO: 12.9 %
NEUTROPHILS # BLD AUTO: 2.89 K/UL
NEUTROPHILS NFR BLD AUTO: 59.2 %
NITRITE URINE: NEGATIVE
PH URINE: 6.5
PLATELET # BLD AUTO: 224 K/UL
POTASSIUM SERPL-SCNC: 4.7 MMOL/L
PROT SERPL-MCNC: 6.9 G/DL
PROTEIN URINE: NEGATIVE
PT BLD: 10.6 SEC
RBC # BLD: 4.45 M/UL
RBC # FLD: 13.4 %
SODIUM SERPL-SCNC: 141 MMOL/L
SPECIFIC GRAVITY URINE: 1.01
UROBILINOGEN URINE: NORMAL
VIT B12 SERPL-MCNC: 807 PG/ML
WBC # FLD AUTO: 4.88 K/UL

## 2019-04-16 RX ORDER — FLUTICASONE PROPIONATE 220 MCG
1 AEROSOL WITH ADAPTER (GRAM) INHALATION
Qty: 0 | Refills: 0 | COMMUNITY

## 2019-04-16 NOTE — CONSULT LETTER
[Dear  ___] : Dear  [unfilled], [Consult Letter:] : I had the pleasure of evaluating your patient, [unfilled]. [Please see my note below.] : Please see my note below. [Consult Closing:] : Thank you very much for allowing me to participate in the care of this patient.  If you have any questions, please do not hesitate to contact me. [Sincerely,] : Sincerely, [FreeTextEntry3] : Amy Weller MD\par

## 2019-04-16 NOTE — ASSESSMENT
[FreeTextEntry1] : Repeat blood work , slight elevation of LFTs, LDH WNL...CT scan some lymphadenopathy.\par \par At this point pt was seen by Dr. Myesr and she will undergo EBUS and Mediastinoscopy for tissue diagnosis.\par

## 2019-04-16 NOTE — HISTORY OF PRESENT ILLNESS
[de-identified] : 58 years old female history of diabetes mellitus 2 with presented with weight loss in the last several months and fatigue.... patient was found to have made just in the lymphadenopathy by CT scan and is here for further evaluation...

## 2019-04-17 ENCOUNTER — RESULT REVIEW (OUTPATIENT)
Age: 59
End: 2019-04-17

## 2019-04-17 ENCOUNTER — APPOINTMENT (OUTPATIENT)
Dept: THORACIC SURGERY | Facility: HOSPITAL | Age: 59
End: 2019-04-17

## 2019-04-17 ENCOUNTER — INPATIENT (INPATIENT)
Facility: HOSPITAL | Age: 59
LOS: 0 days | Discharge: HOME | DRG: 804 | End: 2019-04-17
Attending: THORACIC SURGERY (CARDIOTHORACIC VASCULAR SURGERY) | Admitting: THORACIC SURGERY (CARDIOTHORACIC VASCULAR SURGERY)
Payer: COMMERCIAL

## 2019-04-17 ENCOUNTER — APPOINTMENT (OUTPATIENT)
Dept: ULTRASOUND IMAGING | Facility: HOSPITAL | Age: 59
End: 2019-04-17

## 2019-04-17 VITALS
RESPIRATION RATE: 15 BRPM | SYSTOLIC BLOOD PRESSURE: 134 MMHG | OXYGEN SATURATION: 94 % | DIASTOLIC BLOOD PRESSURE: 81 MMHG | HEART RATE: 82 BPM

## 2019-04-17 DIAGNOSIS — Z98.890 OTHER SPECIFIED POSTPROCEDURAL STATES: Chronic | ICD-10-CM

## 2019-04-17 DIAGNOSIS — Z41.9 ENCOUNTER FOR PROCEDURE FOR PURPOSES OTHER THAN REMEDYING HEALTH STATE, UNSPECIFIED: Chronic | ICD-10-CM

## 2019-04-17 LAB
GLUCOSE BLDC GLUCOMTR-MCNC: 157 MG/DL — HIGH (ref 70–99)
GLUCOSE BLDC GLUCOMTR-MCNC: 176 MG/DL — HIGH (ref 70–99)

## 2019-04-17 PROCEDURE — 71045 X-RAY EXAM CHEST 1 VIEW: CPT | Mod: 26

## 2019-04-17 PROCEDURE — 39402 MEDIASTINOSCPY W/LMPH NOD BX: CPT

## 2019-04-17 RX ORDER — BUPIVACAINE 13.3 MG/ML
20 INJECTION, SUSPENSION, LIPOSOMAL INFILTRATION ONCE
Qty: 0 | Refills: 0 | Status: DISCONTINUED | OUTPATIENT
Start: 2019-04-17 | End: 2019-04-17

## 2019-04-17 RX ORDER — SODIUM CHLORIDE 9 MG/ML
1000 INJECTION, SOLUTION INTRAVENOUS
Qty: 0 | Refills: 0 | Status: DISCONTINUED | OUTPATIENT
Start: 2019-04-17 | End: 2019-04-17

## 2019-04-17 NOTE — BRIEF OPERATIVE NOTE - OPERATION/FINDINGS
multiple biopsies of mediastinal lymphadenopathy with frozen section revealing granulomatous disease.

## 2019-04-17 NOTE — BRIEF OPERATIVE NOTE - NSICDXBRIEFPROCEDURE_GEN_ALL_CORE_FT
PROCEDURES:  Mediastinoscopy 17-Apr-2019 12:18:55  Lucas Kc  Mediastinoscopy, with mediastinal mass biopsy 17-Apr-2019 12:18:53  Lucas Kc

## 2019-04-18 PROCEDURE — 71045 X-RAY EXAM CHEST 1 VIEW: CPT

## 2019-04-18 PROCEDURE — 86901 BLOOD TYPING SEROLOGIC RH(D): CPT

## 2019-04-18 PROCEDURE — 82962 GLUCOSE BLOOD TEST: CPT

## 2019-04-18 PROCEDURE — 86850 RBC ANTIBODY SCREEN: CPT

## 2019-04-18 PROCEDURE — 88331 PATH CONSLTJ SURG 1 BLK 1SPC: CPT

## 2019-04-18 PROCEDURE — 88305 TISSUE EXAM BY PATHOLOGIST: CPT

## 2019-04-19 ENCOUNTER — OUTPATIENT (OUTPATIENT)
Dept: OUTPATIENT SERVICES | Facility: HOSPITAL | Age: 59
LOS: 1 days | End: 2019-04-19

## 2019-04-19 ENCOUNTER — APPOINTMENT (OUTPATIENT)
Dept: MRI IMAGING | Facility: CLINIC | Age: 59
End: 2019-04-19
Payer: COMMERCIAL

## 2019-04-19 DIAGNOSIS — Z98.890 OTHER SPECIFIED POSTPROCEDURAL STATES: Chronic | ICD-10-CM

## 2019-04-19 DIAGNOSIS — Z41.9 ENCOUNTER FOR PROCEDURE FOR PURPOSES OTHER THAN REMEDYING HEALTH STATE, UNSPECIFIED: Chronic | ICD-10-CM

## 2019-04-19 PROBLEM — E11.9 TYPE 2 DIABETES MELLITUS WITHOUT COMPLICATIONS: Chronic | Status: ACTIVE | Noted: 2019-04-16

## 2019-04-19 PROBLEM — N20.0 CALCULUS OF KIDNEY: Chronic | Status: ACTIVE | Noted: 2019-04-16

## 2019-04-19 LAB — SURGICAL PATHOLOGY STUDY: SIGNIFICANT CHANGE UP

## 2019-04-19 PROCEDURE — 70553 MRI BRAIN STEM W/O & W/DYE: CPT | Mod: 26

## 2019-04-23 ENCOUNTER — MESSAGE (OUTPATIENT)
Age: 59
End: 2019-04-23

## 2019-04-23 DIAGNOSIS — R59.0 LOCALIZED ENLARGED LYMPH NODES: ICD-10-CM

## 2019-04-25 ENCOUNTER — APPOINTMENT (OUTPATIENT)
Dept: THORACIC SURGERY | Facility: CLINIC | Age: 59
End: 2019-04-25
Payer: COMMERCIAL

## 2019-04-25 PROCEDURE — 99213 OFFICE O/P EST LOW 20 MIN: CPT

## 2019-04-30 NOTE — HISTORY OF PRESENT ILLNESS
[FreeTextEntry1] : 58 yr old female, nonsmoker with a PMH of DM II. She reported recent weight loss (total of 20lbs since September) and fatigue since February. She has also complained of mild headaches and dizziness for the last month. She was evaluated by her PCP and was sent for a CT chest/abdomen/pelvis which revealed mediastinal adenopathy. She presents today to [facility] the results from recent EBUS. She was referred by Dr. Weller. She presents today with a CXR. \par \par CT chest completed on 03/27/19:\par -interval development of significant mediastinal and bilateral hilar lymphadenopathy \par -right paratracheal lymph node is measuring 1.9cm\par -enlarged subcarinal lymph node measuring 1.8cm\par -enlarged bilateral hilar lymph nodes measuring 1.8cm \par \par CT abdomen/pelvis: 04/10/19\par - gastroesophageal junction adenopathy\par \par Mediastinoscopy completed on 04/17/19:\par 1. LN, right level two peritracheal : non-necrotizing granulomatous inflammation\par 2. LN, right peritracheal level 4: non-necrotizing granulomatous inflammation \par -no evidence of lymphoma \par -AFB and fungal organisms negative \par

## 2019-04-30 NOTE — ASSESSMENT
[FreeTextEntry1] : 58 yr old female s/p mediastinoscopy on 04/17/19 and presents today to discuss the results.\par \par Pathology revealed non-necrotizing granulomatous inflammation which was described to the patient as sarcoidosis. Due to systemic symptoms treatment will likely be advised from a Pulmonologist. I referred her to be evaluated by Dr. Pari Barnes for sarcoidosis treatment. \par \par Her previous PET CT revealed a thyroid nodule that she will plan on having biopsied by IR under Dr. Parham's care. She still has moderate swelling around mediastinoscopy site and I advised that she should reschedule the FNA for another two weeks or until the swelling has resolved. I will communicated with both Dr. Parham and Dr. Miller regarding plan of care. \par \par Plan:\par 1. RTC in three weeks to evaluate the mediastinoscopy incision

## 2019-05-02 ENCOUNTER — APPOINTMENT (OUTPATIENT)
Dept: ULTRASOUND IMAGING | Facility: HOSPITAL | Age: 59
End: 2019-05-02

## 2019-05-03 ENCOUNTER — APPOINTMENT (OUTPATIENT)
Dept: GASTROENTEROLOGY | Facility: CLINIC | Age: 59
End: 2019-05-03
Payer: COMMERCIAL

## 2019-05-03 VITALS
DIASTOLIC BLOOD PRESSURE: 90 MMHG | SYSTOLIC BLOOD PRESSURE: 126 MMHG | OXYGEN SATURATION: 97 % | BODY MASS INDEX: 25.34 KG/M2 | HEART RATE: 63 BPM | WEIGHT: 143 LBS | RESPIRATION RATE: 14 BRPM | HEIGHT: 63 IN

## 2019-05-03 PROCEDURE — 36415 COLL VENOUS BLD VENIPUNCTURE: CPT

## 2019-05-03 PROCEDURE — 99214 OFFICE O/P EST MOD 30 MIN: CPT

## 2019-05-03 PROCEDURE — 99204 OFFICE O/P NEW MOD 45 MIN: CPT | Mod: 25

## 2019-05-03 NOTE — PHYSICAL EXAM
[General Appearance - Alert] : alert [General Appearance - Well Nourished] : well nourished [Sclera] : the sclera and conjunctiva were normal [General Appearance - In No Acute Distress] : in no acute distress [Neck Appearance] : the appearance of the neck was normal [Outer Ear] : the ears and nose were normal in appearance [Oropharynx] : the oropharynx was normal [Heart Rate And Rhythm] : heart rate was normal and rhythm regular [Bowel Sounds] : normal bowel sounds [Abdomen Soft] : soft [Edema] : there was no peripheral edema [Abnormal Walk] : normal gait [] : no rash [Skin Turgor] : normal skin turgor [Skin Color & Pigmentation] : normal skin color and pigmentation [Oriented To Time, Place, And Person] : oriented to person, place, and time [FreeTextEntry1] : slightly tender when palpation of liver

## 2019-05-03 NOTE — HISTORY OF PRESENT ILLNESS
[de-identified] : 58F with PMHx DM type 2 referred by Casi Rubin NP for evaluation of abnormal liver tests\par \par Patient reports today that she, "keeps losing weight" , she has lost about 20 lbs since September. She denies any GI complaints at this time. However, she does complain of discomfort in her chest and cramps in her hands and feet. She recently saw Dr. Myers for evaluation who did a CT of chest which showed significant mediastinal and bilateral hilar lymphadenopathy as well as 3 enlarged lymph nodes. Pathology positive for sarcoidosis. She will be seeing Dr. Pari Barnes on 5/7/19 for sarcoidosis treatment. \par \par Alk phos was found to be slightly elevated (122U/L) in November 2018 and now 419 U/L. Addiontionally ALT/AST has been steadily increasing since that time. Now AST 49 U/L and ALT 91 U/L. She denies starting any new medications since November 2018 except Metformin. No family history of liver disease that she is aware of.\par \par Last colonoscopy (3/19/19): internal hemorrhoids otherwise unremarkable\par

## 2019-05-06 LAB
A1AT SERPL-MCNC: 128 MG/DL
ALBUMIN SERPL ELPH-MCNC: 4.3 G/DL
ALP BLD-CCNC: 219 U/L
ALT SERPL-CCNC: 54 U/L
ANION GAP SERPL CALC-SCNC: 15 MMOL/L
AST SERPL-CCNC: 38 U/L
BILIRUB SERPL-MCNC: 0.4 MG/DL
BUN SERPL-MCNC: 13 MG/DL
CALCIUM SERPL-MCNC: 9.5 MG/DL
CHLORIDE SERPL-SCNC: 102 MMOL/L
CO2 SERPL-SCNC: 20 MMOL/L
CREAT SERPL-MCNC: 0.54 MG/DL
GLUCOSE SERPL-MCNC: 125 MG/DL
IGA SER QL IEP: 175 MG/DL
IGG SER QL IEP: 904 MG/DL
IGM SER QL IEP: 56 MG/DL
POTASSIUM SERPL-SCNC: 4.1 MMOL/L
PROT SERPL-MCNC: 6.7 G/DL
SODIUM SERPL-SCNC: 137 MMOL/L

## 2019-05-07 ENCOUNTER — APPOINTMENT (OUTPATIENT)
Dept: PULMONOLOGY | Facility: CLINIC | Age: 59
End: 2019-05-07
Payer: COMMERCIAL

## 2019-05-07 VITALS
SYSTOLIC BLOOD PRESSURE: 110 MMHG | HEART RATE: 98 BPM | DIASTOLIC BLOOD PRESSURE: 86 MMHG | TEMPERATURE: 97.6 F | WEIGHT: 143 LBS | HEIGHT: 63 IN | OXYGEN SATURATION: 98 % | BODY MASS INDEX: 25.34 KG/M2

## 2019-05-07 LAB
GLIADIN IGA SER QL: <5 UNITS
GLIADIN IGG SER QL: <5 UNITS
GLIADIN PEPTIDE IGA SER-ACNC: NEGATIVE
GLIADIN PEPTIDE IGG SER-ACNC: NEGATIVE
TTG IGA SER IA-ACNC: <1.2 U/ML
TTG IGA SER-ACNC: NEGATIVE
TTG IGG SER IA-ACNC: <1.2 U/ML
TTG IGG SER IA-ACNC: NEGATIVE

## 2019-05-07 PROCEDURE — 94727 GAS DIL/WSHOT DETER LNG VOL: CPT

## 2019-05-07 PROCEDURE — 99244 OFF/OP CNSLTJ NEW/EST MOD 40: CPT | Mod: 25

## 2019-05-07 PROCEDURE — 94729 DIFFUSING CAPACITY: CPT

## 2019-05-07 PROCEDURE — 94060 EVALUATION OF WHEEZING: CPT

## 2019-05-07 NOTE — PHYSICAL EXAM
[General Appearance - Well Developed] : well developed [General Appearance - In No Acute Distress] : no acute distress [General Appearance - Well Nourished] : well nourished [Normal Conjunctiva] : the conjunctiva exhibited no abnormalities [Normal Oropharynx] : normal oropharynx [Heart Rate And Rhythm] : heart rate and rhythm were normal [FreeTextEntry1] : no LAD [Murmurs] : no murmurs present [Heart Sounds] : normal S1 and S2 [Edema] : no peripheral edema present [Auscultation Breath Sounds / Voice Sounds] : lungs were clear to auscultation bilaterally [Bowel Sounds] : normal bowel sounds [Abdomen Soft] : soft [Abdomen Tenderness] : non-tender [Nail Clubbing] : no clubbing of the fingernails [Cyanosis, Localized] : no localized cyanosis [] : no rash [Affect] : the affect was normal

## 2019-05-07 NOTE — CONSULT LETTER
[Dear  ___] : Dear  [unfilled], [Please see my note below.] : Please see my note below. [( Thank you for referring [unfilled] for consultation for _____ )] : Thank you for referring [unfilled] for consultation for [unfilled] [Consult Closing:] : Thank you very much for allowing me to participate in the care of this patient.  If you have any questions, please do not hesitate to contact me. [Sincerely,] : Sincerely, [FreeTextEntry2] : Reji Myers MD\par [address]\[place] [address]\par  [FreeTextEntry3] : Pari Barnes MD, FCCP\par

## 2019-05-07 NOTE — HISTORY OF PRESENT ILLNESS
[FreeTextEntry1] : 05/06/2019: Asked to evaluate patient by Dr Myers for sarcoidosis. Here w daughter. Started losing weight about 3 mos ago, 30 lbs. CXR showed adenopathy, and mediastinoscopy showed noncaseating granulomas. Additional symptoms include muscle cramps, dry mouth, change in taste, fatigue. + chest discomfort in chest, no palps. Coughing since biopsy, dry. No vision changes. No arthritis/arthralgia. No EN. No fever but did have chills at the beginning. She is diabetic and Invokana was just added; remains on metformin.  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

## 2019-05-07 NOTE — ASSESSMENT
[FreeTextEntry1] : Data reviewed:\par \par CT chest [facility] 3/27/19 personally reviewed: Interval development of significant mediastinal and bilateral hilar lymphadenopathy as above which is of uncertain etiology. Neoplastic etiologies including lymphoma should be considered. 5 mm nodule in the right lower lobe, previously measuring 4 mm. Enlarged paraesophageal lymph node also noted. Few bilateral subcentimeter hypodense thyroid nodules. Consider follow-up and correlation with ultrasound. \par \par PET 4/12/19: activity in adenopathy and nonspecific activity in distal pancreas\par \par Surg path 4/17/19: nonnecrotizing granulomas / fungal & AFB stains neg / flow neg\par \par Nikolai 05/7/2019 : normal\par PFT 5/7/19: nonspecific ventilatory abnormality, no BD response, TLC 89%, DLCO 70%\par \par Impression:\par Sarcoidosis, symptomatic\par \par Plan:\par Does need therapy. Will start prednisone 30mg and talk to Toyin Olivera and Nalini about glycemic control and HBV. Will touch base w Dr Contreras re ECG and echo, and she should return to ophtho for dilated exam.\par Follow up in 2 weeks and hope to lower steroid dose. If cannot come down on steroids, given above complicating factors may need steroid sparing agent.

## 2019-05-07 NOTE — REVIEW OF SYSTEMS
[Dry Mouth] : dry mouth [Chest Discomfort] : chest discomfort [As Noted in HPI] : as noted in HPI [Diabetes] : diabetes mellitus [Negative] : Psychiatric

## 2019-05-08 ENCOUNTER — MOBILE ON CALL (OUTPATIENT)
Age: 59
End: 2019-05-08

## 2019-05-08 LAB
HEV AB SER QL: NEGATIVE
IGG4 SER-MCNC: 63.4 MG/DL

## 2019-05-09 ENCOUNTER — RESULT REVIEW (OUTPATIENT)
Age: 59
End: 2019-05-09

## 2019-05-09 ENCOUNTER — APPOINTMENT (OUTPATIENT)
Dept: OTOLARYNGOLOGY | Facility: CLINIC | Age: 59
End: 2019-05-09

## 2019-05-16 ENCOUNTER — APPOINTMENT (OUTPATIENT)
Dept: THORACIC SURGERY | Facility: CLINIC | Age: 59
End: 2019-05-16
Payer: COMMERCIAL

## 2019-05-16 PROCEDURE — 99214 OFFICE O/P EST MOD 30 MIN: CPT

## 2019-05-16 NOTE — PHYSICAL EXAM
[] : no respiratory distress [Respiration, Rhythm And Depth] : normal respiratory rhythm and effort [Exaggerated Use Of Accessory Muscles For Inspiration] : no accessory muscle use [Auscultation Breath Sounds / Voice Sounds] : lungs were clear to auscultation bilaterally [Apical Impulse] : the apical impulse was normal [Heart Rate And Rhythm] : heart rate was normal and rhythm regular [Heart Sounds] : normal S1 and S2 [2+] : left 2+ [Abnormal Walk] : normal gait [Oriented To Time, Place, And Person] : oriented to person, place, and time

## 2019-05-16 NOTE — REVIEW OF SYSTEMS
[Feeling Tired] : feeling tired [Cough] : cough [SOB on Exertion] : shortness of breath during exertion [Arthralgias] : arthralgias [Negative] : Cardiovascular

## 2019-05-17 NOTE — HISTORY OF PRESENT ILLNESS
[FreeTextEntry1] : 58 yr old female, nonsmoker with a PMH of DM II. She reported recent weight loss (total of 20lbs since September) and fatigue since February. She has also complained of mild headaches and dizziness for the last month. She was evaluated by her PCP and was sent for a CT chest/abdomen/pelvis which revealed mediastinal adenopathy. She presents today to discuss the results from recent EBUS. She was referred by Dr. Weller. She presents today to evaluate the mediastinoscopy incision.\par \par CT chest completed on 03/27/19:\par -interval development of significant mediastinal and bilateral hilar lymphadenopathy \par -right paratracheal lymph node is measuring 1.9cm\par -enlarged subcarinal lymph node measuring 1.8cm\par -enlarged bilateral hilar lymph nodes measuring 1.8cm \par \par CT abdomen/pelvis: 04/10/19\par - gastroesophageal junction adenopathy\par \par Mediastinoscopy completed on 04/17/19:\par 1. LN, right level two peritracheal : non-necrotizing granulomatous inflammation\par 2. LN, right peritracheal level 4: non-necrotizing granulomatous inflammation \par -no evidence of lymphoma \par -AFB and fungal organisms negative \par

## 2019-05-17 NOTE — ASSESSMENT
[FreeTextEntry1] : 58 yr old female s/p mediastinoscopy and found to have symptomatic sarcoidosis. Was seen by Dr. Pari Barnes last week and was started on prednisone 30 mg/daily. From a cardiothoracic standpoint the patient is doing well, mediastinoscopy incision is well healed, no signs of infection. \par \par Will continue treatment with Dr. Barnes. She has plans to see Dr. Olivera and Nalini for glycemic control and HBV as well as an ECHO, and Opthalmology evaluation. \par \par I have reviewed the patient's medical records and diagnostic images at the time of this office visit and have made the following recommendations\par Plan:\par 1. Continue to see Dr. Pari Barnes for treatment of sarcoidosis\par 2. Follow-up here as needed

## 2019-05-17 NOTE — END OF VISIT
[FreeTextEntry3] : All medical record entries made by the Scribe were at my, Dr. Myers's direction and personally dictated by me on 05/16/2019  I have reviewed the chart and agree that the record accurately reflects my personal performance of the history, physical exam, assessment and plan. I have also personally directed, reviewed, and agreed with the chart.

## 2019-05-20 ENCOUNTER — RESULT REVIEW (OUTPATIENT)
Age: 59
End: 2019-05-20

## 2019-05-20 LAB — HIV1+2 AB SPEC QL IA.RAPID: NONREACTIVE

## 2019-05-21 ENCOUNTER — APPOINTMENT (OUTPATIENT)
Dept: PULMONOLOGY | Facility: CLINIC | Age: 59
End: 2019-05-21
Payer: COMMERCIAL

## 2019-05-21 VITALS
HEART RATE: 87 BPM | OXYGEN SATURATION: 98 % | HEIGHT: 63 IN | TEMPERATURE: 97.4 F | SYSTOLIC BLOOD PRESSURE: 110 MMHG | DIASTOLIC BLOOD PRESSURE: 76 MMHG | BODY MASS INDEX: 25.16 KG/M2 | WEIGHT: 142 LBS

## 2019-05-21 PROCEDURE — 99213 OFFICE O/P EST LOW 20 MIN: CPT | Mod: 25

## 2019-05-21 PROCEDURE — 71046 X-RAY EXAM CHEST 2 VIEWS: CPT

## 2019-05-21 NOTE — ASSESSMENT
[FreeTextEntry1] : Data reviewed:\par \par ECG and echo Dr Contreras 4/2019 - ok\par \par CT chest [facility] 3/27/19: Interval development of significant mediastinal and bilateral hilar lymphadenopathy as above which is of uncertain etiology. Neoplastic etiologies including lymphoma should be considered. 5 mm nodule in the right lower lobe, previously measuring 4 mm. Enlarged paraesophageal lymph node also noted. Few bilateral subcentimeter hypodense thyroid nodules. Consider follow-up and correlation with ultrasound. \par \par PET 4/12/19: activity in adenopathy and nonspecific activity in distal pancreas\par \par PA/lat CXR in office 5/21/19: interval improvement in R paratracheal and hilar prominence\par \par Surg path 4/17/19: nonnecrotizing granulomas / fungal & AFB stains neg / flow neg\par \par Nikolai 05/7/2019 : normal\par PFT 5/7/19: nonspecific ventilatory abnormality, no BD response, TLC 89%, DLCO 70%\par \par Impression:\par Sarcoidosis, symptomatic\par \par Plan:\par Slightly improved on 30mg prednisone x 2 weeks.\par Continue same dose and return in another 2 weeks.\par Continue follow up w other docs; has jeremiahal w Dr Goldberg next week.

## 2019-05-21 NOTE — HISTORY OF PRESENT ILLNESS
[FreeTextEntry1] : 05/06/2019: Asked to evaluate patient by Dr Myers for sarcoidosis. Here w daughter. Started losing weight about 3 mos ago, 30 lbs. CXR showed adenopathy, and mediastinoscopy showed noncaseating granulomas. Additional symptoms include muscle cramps, dry mouth, change in taste, fatigue. + chest discomfort in chest, no palps. Coughing since biopsy, dry. No vision changes. No arthritis/arthralgia. No EN. No fever but did have chills at the beginning. She is diabetic and Invokana was just added; remains on metformin.  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 Woke up yesterday w a R back pain going through to front, slightly better now. Coughing less at night but still disturbed at night some nights. Having muscle cramps in toes. Fingers twitches. No fever or chills. Still no taste for food. Main change is improvement in energy.  this morning. On Invokana and metformin. Has not yet obtained HBV meds from pharmacy. Working full time.

## 2019-05-23 ENCOUNTER — OUTPATIENT (OUTPATIENT)
Dept: OUTPATIENT SERVICES | Facility: HOSPITAL | Age: 59
LOS: 1 days | End: 2019-05-23
Payer: COMMERCIAL

## 2019-05-23 ENCOUNTER — APPOINTMENT (OUTPATIENT)
Dept: MRI IMAGING | Facility: HOSPITAL | Age: 59
End: 2019-05-23
Payer: COMMERCIAL

## 2019-05-23 DIAGNOSIS — Z98.890 OTHER SPECIFIED POSTPROCEDURAL STATES: Chronic | ICD-10-CM

## 2019-05-23 DIAGNOSIS — Z41.9 ENCOUNTER FOR PROCEDURE FOR PURPOSES OTHER THAN REMEDYING HEALTH STATE, UNSPECIFIED: Chronic | ICD-10-CM

## 2019-05-23 PROCEDURE — A9585: CPT

## 2019-05-23 PROCEDURE — 74183 MRI ABD W/O CNTR FLWD CNTR: CPT | Mod: 26

## 2019-05-23 PROCEDURE — 74183 MRI ABD W/O CNTR FLWD CNTR: CPT

## 2019-05-28 ENCOUNTER — APPOINTMENT (OUTPATIENT)
Dept: OPHTHALMOLOGY | Facility: CLINIC | Age: 59
End: 2019-05-28
Payer: COMMERCIAL

## 2019-05-28 DIAGNOSIS — H25.13 AGE-RELATED NUCLEAR CATARACT, BILATERAL: ICD-10-CM

## 2019-05-28 DIAGNOSIS — H43.393 OTHER VITREOUS OPACITIES, BILATERAL: ICD-10-CM

## 2019-05-28 PROCEDURE — 92225: CPT | Mod: RT

## 2019-05-28 PROCEDURE — 92014 COMPRE OPH EXAM EST PT 1/>: CPT

## 2019-06-04 ENCOUNTER — RESULT REVIEW (OUTPATIENT)
Age: 59
End: 2019-06-04

## 2019-06-07 ENCOUNTER — APPOINTMENT (OUTPATIENT)
Dept: NEUROLOGY | Facility: CLINIC | Age: 59
End: 2019-06-07
Payer: COMMERCIAL

## 2019-06-07 VITALS
OXYGEN SATURATION: 97 % | HEART RATE: 111 BPM | HEIGHT: 63 IN | TEMPERATURE: 99.4 F | WEIGHT: 141 LBS | DIASTOLIC BLOOD PRESSURE: 80 MMHG | BODY MASS INDEX: 24.98 KG/M2 | SYSTOLIC BLOOD PRESSURE: 135 MMHG

## 2019-06-07 DIAGNOSIS — R41.0 DISORIENTATION, UNSPECIFIED: ICD-10-CM

## 2019-06-07 PROCEDURE — 99203 OFFICE O/P NEW LOW 30 MIN: CPT

## 2019-06-07 NOTE — HISTORY OF PRESENT ILLNESS
[FreeTextEntry1] : Referred by Dr. Olivera for neurologic evaluation\par A few months ago started having confusion, memory issues\par Has had several other issues including chest pain, cough, neck pain, weight loss, hand cramps. \par Found to have chest lymphadenopathy, had a biopsy which showed granuloma, then started steroids for sarcoidosis about 1 month ago with an improvement in energy level\par MRI brain with contrast about 6 weeks ago was unremarkable \par She also has "heaviness" on right side of head / scalp, no pain or tingling. \par \par 10 pt review of systems performed and reviewed with patient\par Past medical history, surgical history, social history, and family history reviewed with patient\par See scanned document for details

## 2019-06-07 NOTE — CONSULT LETTER
[Dear  ___] : Dear  [unfilled], [Consult Letter:] : I had the pleasure of evaluating your patient, [unfilled]. [Please see my note below.] : Please see my note below. [Consult Closing:] : Thank you very much for allowing me to participate in the care of this patient.  If you have any questions, please do not hesitate to contact me. [Sincerely,] : Sincerely, [FreeTextEntry3] : Sathish Ceron M.D.\par Neurology, Electromyography and Neuromuscular Medicine\par [facility]\par \par  of Neurology\par [facility] [employer]

## 2019-06-07 NOTE — PHYSICAL EXAM
[FreeTextEntry1] : Gen: appears well, well-nourished, no acute distress\par \par MS: awake, alert, speech fluent, comprehension intact, follows commands\par \par CN: PERRL, EOMI, visual fields full, facial strength and sensation intact and symmetric, palate elevation symmetric, tongue midline, no tongue atrophy or fasciculations\par \par Motor: normal bulk and tone, 5/5 strength throughout, no abnormal movements\par \par Sensory: light touch intact and symmetric throughout; pinprick mildly hyperesthetic on right side of scalp compared to left\par \par Reflexes: 1+ symmetric throughout, no Johnson’s sign, plantar responses flexor bilaterally\par \par Coordination: no dysmetria on finger to nose\par \par Gait: normal\par \par MSK: tender hard area in right semispinalis capitis

## 2019-06-07 NOTE — ASSESSMENT
[FreeTextEntry1] : Neurologic symptoms likely related to sarcoidosis; MRI with no abnormal enhancement, no mass lesions\par Right occipital neuralgia - may be due to a granuloma overlying nerve, although no clear lesion on PET that corresponds. Since she does not have significant pain, no intervention at this time, call if becomes more bothersome and can consider nerve block / localized steroid injection \par Continue treatment for sarcoid, return in 3-4 months for follow up

## 2019-06-11 ENCOUNTER — APPOINTMENT (OUTPATIENT)
Dept: PULMONOLOGY | Facility: CLINIC | Age: 59
End: 2019-06-11
Payer: COMMERCIAL

## 2019-06-11 VITALS
HEART RATE: 99 BPM | SYSTOLIC BLOOD PRESSURE: 110 MMHG | TEMPERATURE: 97.5 F | OXYGEN SATURATION: 98 % | HEIGHT: 63 IN | WEIGHT: 144 LBS | DIASTOLIC BLOOD PRESSURE: 80 MMHG | BODY MASS INDEX: 25.52 KG/M2

## 2019-06-11 PROCEDURE — 71046 X-RAY EXAM CHEST 2 VIEWS: CPT

## 2019-06-11 PROCEDURE — 99213 OFFICE O/P EST LOW 20 MIN: CPT | Mod: 25

## 2019-06-11 PROCEDURE — 94010 BREATHING CAPACITY TEST: CPT

## 2019-06-11 NOTE — PHYSICAL EXAM
[Heart Rate And Rhythm] : heart rate and rhythm were normal [General Appearance - In No Acute Distress] : no acute distress [Heart Sounds] : normal S1 and S2 [Murmurs] : no murmurs present [Auscultation Breath Sounds / Voice Sounds] : lungs were clear to auscultation bilaterally [Edema] : no peripheral edema present

## 2019-06-11 NOTE — HISTORY OF PRESENT ILLNESS
[FreeTextEntry1] : 05/06/2019: Asked to evaluate patient by Dr Myers for sarcoidosis. Here w daughter. Started losing weight about 3 mos ago, 30 lbs. CXR showed adenopathy, and mediastinoscopy showed noncaseating granulomas. Additional symptoms include muscle cramps, dry mouth, change in taste, fatigue. + chest discomfort in chest, no palps. Coughing since biopsy, dry. No vision changes. No arthritis/arthralgia. No EN. No fever but did have chills at the beginning. She is diabetic and Invokana was just added; remains on metformin.  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 Woke up yesterday w a R back pain going through to front, slightly better now. Coughing less at night but still disturbed at night some nights. Having muscle cramps in toes. Fingers twitches. No fever or chills. Still no taste for food. Main change is improvement in energy.  this morning. On Invokana and metformin. Has not yet obtained HBV meds from pharmacy. Working full time.\par \par 6/11/19: Energy improving. Cough less but still present. Ophtho ok. Neuro - symptoms probably sarcoid related. Still not much taste. Not checking blood glucose. Weight stable to up ~3 lbs. Definitely better - can work all day without resting. Almost back to normal but not quite.

## 2019-06-11 NOTE — ASSESSMENT
[FreeTextEntry1] : Data reviewed:\par \par ECG and echo Dr Contreras 4/2019 - ok\par \par CT chest [facility] 3/27/19: Interval development of significant mediastinal and bilateral hilar lymphadenopathy as above which is of uncertain etiology. Neoplastic etiologies including lymphoma should be considered. 5 mm nodule in the right lower lobe, previously measuring 4 mm. Enlarged paraesophageal lymph node also noted. Few bilateral subcentimeter hypodense thyroid nodules. Consider follow-up and correlation with ultrasound. \par \par PET 4/12/19: activity in adenopathy and nonspecific activity in distal pancreas\par \par PA/lat CXR in office 5/21/19: interval improvement in R paratracheal and hilar prominence\par PA/lat CXR in office 6/11/19: stable to slightly better\par \par Surg path 4/17/19: nonnecrotizing granulomas / fungal & AFB stains neg / flow neg\par \par Nikolai 05/7/2019 : normal\par PFT 5/7/19: nonspecific ventilatory abnormality, no BD response, TLC 89%, DLCO 70%\par Nikolai 6/11/19: normal\par \par Impression:\par Sarcoidosis, symptomatic, on pred 30mg since 5/7/19\par \par Plan:\par Pred 25mg x 4 weeks and return.\par Call w any problems.\par Ophtho eval 5/28/19.

## 2019-06-12 ENCOUNTER — APPOINTMENT (OUTPATIENT)
Dept: INTERNAL MEDICINE | Facility: CLINIC | Age: 59
End: 2019-06-12
Payer: COMMERCIAL

## 2019-06-12 ENCOUNTER — APPOINTMENT (OUTPATIENT)
Dept: ULTRASOUND IMAGING | Facility: HOSPITAL | Age: 59
End: 2019-06-12

## 2019-06-12 ENCOUNTER — FORM ENCOUNTER (OUTPATIENT)
Age: 59
End: 2019-06-12

## 2019-06-12 VITALS
OXYGEN SATURATION: 96 % | TEMPERATURE: 98.9 F | SYSTOLIC BLOOD PRESSURE: 114 MMHG | RESPIRATION RATE: 14 BRPM | BODY MASS INDEX: 25.51 KG/M2 | DIASTOLIC BLOOD PRESSURE: 77 MMHG | HEART RATE: 94 BPM | WEIGHT: 144 LBS

## 2019-06-12 PROCEDURE — 99214 OFFICE O/P EST MOD 30 MIN: CPT

## 2019-06-12 PROCEDURE — 36415 COLL VENOUS BLD VENIPUNCTURE: CPT

## 2019-06-12 NOTE — PHYSICAL EXAM
[No Acute Distress] : no acute distress [Normal Oropharynx] : the oropharynx was normal [Well Nourished] : well nourished [Well Developed] : well developed [No Lymphadenopathy] : no lymphadenopathy [Supple] : supple [No Respiratory Distress] : no respiratory distress  [Normal Rate] : normal rate  [No Accessory Muscle Use] : no accessory muscle use [Clear to Auscultation] : lungs were clear to auscultation bilaterally [Normal S1, S2] : normal S1 and S2 [Regular Rhythm] : with a regular rhythm [Soft] : abdomen soft [Non Tender] : non-tender [Non-distended] : non-distended [Normal Bowel Sounds] : normal bowel sounds [No HSM] : no HSM [Normal Affect] : the affect was normal [Normal Gait] : normal gait [No Focal Deficits] : no focal deficits [Normal Insight/Judgement] : insight and judgment were intact

## 2019-06-12 NOTE — HISTORY OF PRESENT ILLNESS
[de-identified] : Overall feeling better.  Cough still present, s/p neuro and ophtho evals without findings.  s/p Pulmonary evaluations with Dr Barnes - thought to be sarcoid related.\par Plan for bx with Dr Parham tomorrow.  \par Memory is still a concern, maybe slight improvement.\par DM - has not been checking recently.  Lat A1C was 7.9%   - remains on metfromin and INvokana\par  - reports FS at first went down while on steroids....taking both invokana and metformin [FreeTextEntry1] : cough

## 2019-06-12 NOTE — PLAN
[FreeTextEntry1] : DM - check labs today\par  continue invokana and metformin\par currently not on RAAS blockade - can consider for the future pending BP\par \par Plan for bx of thyroid and left parotid with Dr Parham - await pahtology\par \par Cough- chronic - no other medication for this..should slowly improve.\par \par \par

## 2019-06-13 ENCOUNTER — RESULT REVIEW (OUTPATIENT)
Age: 59
End: 2019-06-13

## 2019-06-13 ENCOUNTER — APPOINTMENT (OUTPATIENT)
Dept: ULTRASOUND IMAGING | Facility: HOSPITAL | Age: 59
End: 2019-06-13
Payer: COMMERCIAL

## 2019-06-13 ENCOUNTER — OUTPATIENT (OUTPATIENT)
Dept: OUTPATIENT SERVICES | Facility: HOSPITAL | Age: 59
LOS: 1 days | End: 2019-06-13
Payer: COMMERCIAL

## 2019-06-13 DIAGNOSIS — Z41.9 ENCOUNTER FOR PROCEDURE FOR PURPOSES OTHER THAN REMEDYING HEALTH STATE, UNSPECIFIED: Chronic | ICD-10-CM

## 2019-06-13 DIAGNOSIS — Z98.890 OTHER SPECIFIED POSTPROCEDURAL STATES: Chronic | ICD-10-CM

## 2019-06-13 LAB
ALBUMIN SERPL ELPH-MCNC: 3.8 G/DL
ALP BLD-CCNC: 80 U/L
ALT SERPL-CCNC: 19 U/L
ANION GAP SERPL CALC-SCNC: 12 MMOL/L
APPEARANCE: ABNORMAL
AST SERPL-CCNC: 9 U/L
BASOPHILS # BLD AUTO: 0.02 K/UL
BASOPHILS NFR BLD AUTO: 0.3 %
BILIRUB SERPL-MCNC: 0.4 MG/DL
BILIRUBIN URINE: NEGATIVE
BLOOD URINE: NEGATIVE
BUN SERPL-MCNC: 22 MG/DL
CALCIUM SERPL-MCNC: 9.2 MG/DL
CHLORIDE SERPL-SCNC: 101 MMOL/L
CO2 SERPL-SCNC: 26 MMOL/L
COLOR: YELLOW
CREAT SERPL-MCNC: 0.7 MG/DL
CREAT SPEC-SCNC: 60 MG/DL
EOSINOPHIL # BLD AUTO: 0.09 K/UL
EOSINOPHIL NFR BLD AUTO: 1.4 %
ESTIMATED AVERAGE GLUCOSE: 206 MG/DL
GLUCOSE QUALITATIVE U: ABNORMAL
GLUCOSE SERPL-MCNC: 162 MG/DL
HBA1C MFR BLD HPLC: 8.8 %
HCT VFR BLD CALC: 44.7 %
HGB BLD-MCNC: 13.5 G/DL
IMM GRANULOCYTES NFR BLD AUTO: 0.5 %
KETONES URINE: NORMAL
LEUKOCYTE ESTERASE URINE: ABNORMAL
LYMPHOCYTES # BLD AUTO: 1.23 K/UL
LYMPHOCYTES NFR BLD AUTO: 18.8 %
MAN DIFF?: NORMAL
MCHC RBC-ENTMCNC: 30.2 GM/DL
MCHC RBC-ENTMCNC: 30.5 PG
MCV RBC AUTO: 100.9 FL
MICROALBUMIN 24H UR DL<=1MG/L-MCNC: <1.2 MG/DL
MICROALBUMIN/CREAT 24H UR-RTO: NORMAL MG/G
MONOCYTES # BLD AUTO: 0.6 K/UL
MONOCYTES NFR BLD AUTO: 9.2 %
NEUTROPHILS # BLD AUTO: 4.56 K/UL
NEUTROPHILS NFR BLD AUTO: 69.8 %
NITRITE URINE: NEGATIVE
PH URINE: 6.5
PLATELET # BLD AUTO: 181 K/UL
POTASSIUM SERPL-SCNC: 4.2 MMOL/L
PROT SERPL-MCNC: 5.9 G/DL
PROTEIN URINE: NEGATIVE
RBC # BLD: 4.43 M/UL
RBC # FLD: 15.1 %
SODIUM SERPL-SCNC: 139 MMOL/L
SPECIFIC GRAVITY URINE: 1.03
UROBILINOGEN URINE: NORMAL
WBC # FLD AUTO: 6.53 K/UL

## 2019-06-13 PROCEDURE — 76536 US EXAM OF HEAD AND NECK: CPT

## 2019-06-13 PROCEDURE — 10005 FNA BX W/US GDN 1ST LES: CPT

## 2019-06-13 PROCEDURE — 88305 TISSUE EXAM BY PATHOLOGIST: CPT

## 2019-06-13 PROCEDURE — 88173 CYTOPATH EVAL FNA REPORT: CPT

## 2019-06-13 PROCEDURE — 76536 US EXAM OF HEAD AND NECK: CPT | Mod: 26

## 2019-06-17 LAB — NON-GYNECOLOGICAL CYTOLOGY STUDY: SIGNIFICANT CHANGE UP

## 2019-06-19 ENCOUNTER — APPOINTMENT (OUTPATIENT)
Dept: OTOLARYNGOLOGY | Facility: CLINIC | Age: 59
End: 2019-06-19
Payer: COMMERCIAL

## 2019-06-19 ENCOUNTER — RX RENEWAL (OUTPATIENT)
Age: 59
End: 2019-06-19

## 2019-06-19 VITALS
DIASTOLIC BLOOD PRESSURE: 78 MMHG | SYSTOLIC BLOOD PRESSURE: 138 MMHG | HEIGHT: 63 IN | WEIGHT: 141 LBS | BODY MASS INDEX: 24.98 KG/M2 | HEART RATE: 75 BPM

## 2019-06-19 DIAGNOSIS — Z01.818 ENCOUNTER FOR OTHER PREPROCEDURAL EXAMINATION: ICD-10-CM

## 2019-06-19 DIAGNOSIS — R05 COUGH: ICD-10-CM

## 2019-06-19 DIAGNOSIS — Z09 ENCOUNTER FOR FOLLOW-UP EXAMINATION AFTER COMPLETED TREATMENT FOR CONDITIONS OTHER THAN MALIGNANT NEOPLASM: ICD-10-CM

## 2019-06-19 PROCEDURE — 99214 OFFICE O/P EST MOD 30 MIN: CPT

## 2019-06-19 RX ORDER — TENOFOVIR ALAFENAMIDE 25 MG/1
25 TABLET ORAL
Qty: 30 | Refills: 3 | Status: DISCONTINUED | COMMUNITY
Start: 2019-05-09 | End: 2019-06-19

## 2019-06-20 ENCOUNTER — MED ADMIN CHARGE (OUTPATIENT)
Age: 59
End: 2019-06-20

## 2019-06-20 ENCOUNTER — MESSAGE (OUTPATIENT)
Age: 59
End: 2019-06-20

## 2019-06-20 ENCOUNTER — MEDICATION RENEWAL (OUTPATIENT)
Age: 59
End: 2019-06-20

## 2019-06-21 ENCOUNTER — CLINICAL ADVICE (OUTPATIENT)
Age: 59
End: 2019-06-21

## 2019-07-02 ENCOUNTER — FORM ENCOUNTER (OUTPATIENT)
Age: 59
End: 2019-07-02

## 2019-07-03 ENCOUNTER — OUTPATIENT (OUTPATIENT)
Dept: OUTPATIENT SERVICES | Facility: HOSPITAL | Age: 59
LOS: 1 days | End: 2019-07-03
Payer: COMMERCIAL

## 2019-07-03 ENCOUNTER — APPOINTMENT (OUTPATIENT)
Dept: ULTRASOUND IMAGING | Facility: HOSPITAL | Age: 59
End: 2019-07-03
Payer: COMMERCIAL

## 2019-07-03 ENCOUNTER — RESULT REVIEW (OUTPATIENT)
Age: 59
End: 2019-07-03

## 2019-07-03 DIAGNOSIS — Z41.9 ENCOUNTER FOR PROCEDURE FOR PURPOSES OTHER THAN REMEDYING HEALTH STATE, UNSPECIFIED: Chronic | ICD-10-CM

## 2019-07-03 DIAGNOSIS — Z98.890 OTHER SPECIFIED POSTPROCEDURAL STATES: Chronic | ICD-10-CM

## 2019-07-03 PROCEDURE — 10005 FNA BX W/US GDN 1ST LES: CPT

## 2019-07-03 PROCEDURE — 88173 CYTOPATH EVAL FNA REPORT: CPT

## 2019-07-03 PROCEDURE — 88305 TISSUE EXAM BY PATHOLOGIST: CPT

## 2019-07-08 LAB — NON-GYNECOLOGICAL CYTOLOGY STUDY: SIGNIFICANT CHANGE UP

## 2019-07-09 ENCOUNTER — APPOINTMENT (OUTPATIENT)
Dept: PULMONOLOGY | Facility: CLINIC | Age: 59
End: 2019-07-09
Payer: COMMERCIAL

## 2019-07-09 VITALS
SYSTOLIC BLOOD PRESSURE: 110 MMHG | DIASTOLIC BLOOD PRESSURE: 76 MMHG | TEMPERATURE: 98.5 F | HEART RATE: 88 BPM | OXYGEN SATURATION: 97 %

## 2019-07-09 PROCEDURE — 99213 OFFICE O/P EST LOW 20 MIN: CPT

## 2019-07-09 NOTE — PHYSICAL EXAM
[General Appearance - Well Developed] : well developed [General Appearance - Well Nourished] : well nourished [Heart Rate And Rhythm] : heart rate and rhythm were normal [General Appearance - In No Acute Distress] : no acute distress [Heart Sounds] : normal S1 and S2 [Murmurs] : no murmurs present [Edema] : no peripheral edema present [Auscultation Breath Sounds / Voice Sounds] : lungs were clear to auscultation bilaterally

## 2019-07-09 NOTE — HISTORY OF PRESENT ILLNESS
[FreeTextEntry1] : 05/06/2019: Asked to evaluate patient by Dr Myers for sarcoidosis. Here w daughter. Started losing weight about 3 mos ago, 30 lbs. CXR showed adenopathy, and mediastinoscopy showed noncaseating granulomas. Additional symptoms include muscle cramps, dry mouth, change in taste, fatigue. + chest discomfort in chest, no palps. Coughing since biopsy, dry. No vision changes. No arthritis/arthralgia. No EN. No fever but did have chills at the beginning. She is diabetic and Invokana was just added; remains on metformin.  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 Woke up yesterday w a R back pain going through to front, slightly better now. Coughing less at night but still disturbed at night some nights. Having muscle cramps in toes. Fingers twitches. No fever or chills. Still no taste for food. Main change is improvement in energy.  this morning. On Invokana and metformin. Has not yet obtained HBV meds from pharmacy. Working full time.\par \par 6/11/19: Energy improving. Cough less but still present. Ophtho ok. Neuro - symptoms probably sarcoid related. Still not much taste. Not checking blood glucose. Weight stable to up ~3 lbs. Definitely better - can work all day without resting. Almost back to normal but not quite.\par \par 7/9/19: In the interim she was treated with Augmentin for sinusitis and is still on that and her cough got much better; still w a little PND. Overall she is getting better every day in terms of energy and well being, weight stable, working full time. Had to add another med for glycemic control. She got the tenofovir from Dr Gayle.

## 2019-07-09 NOTE — ASSESSMENT
[FreeTextEntry1] : Data reviewed:\par \par ECG and echo Dr Contreras 4/2019 - ok\par \par CT chest [facility] 3/27/19: Interval development of significant mediastinal and bilateral hilar lymphadenopathy as above which is of uncertain etiology. Neoplastic etiologies including lymphoma should be considered. 5 mm nodule in the right lower lobe, previously measuring 4 mm. Enlarged paraesophageal lymph node also noted. Few bilateral subcentimeter hypodense thyroid nodules. Consider follow-up and correlation with ultrasound. \par \par PET 4/12/19: activity in adenopathy and nonspecific activity in distal pancreas\par \par PA/lat CXR in office 5/21/19: interval improvement in R paratracheal and hilar prominence\par PA/lat CXR in office 6/11/19: stable to slightly better\par \par Surg path 4/17/19: nonnecrotizing granulomas / fungal & AFB stains neg / flow neg\par \par Nikolai 05/7/2019 : normal\par PFT 5/7/19: nonspecific ventilatory abnormality, no BD response, TLC 89%, DLCO 70%\par Nikolai 6/11/19: normal\par \par Impression:\par Sarcoidosis, symptomatic, on pred 30mg since 5/7/19, 25mg since 6/11\par \par Plan:\par Markedly improved on prednisone and tolerating taper.\par Pred 20mg x 4 weeks, then 15mg x 4 weeks, and return.\par Call w any problems.\par Ophtho eval 5/28/19.

## 2019-07-30 NOTE — HISTORY OF PRESENT ILLNESS
[de-identified] : Ms. Branch c/o lot of congestion and postnasal drip from right nose, along with some pain in right maxillary sinus, for a few weeks now.  Doesn't do saline rinses daily.\par On steroids 25 mg prednisone for sarcoidosis.  Now on glipizide also for increased glucose due to steroids\par Has much more energy and exercise tolerance on the steroid treatment \par Dry mouth is also better.\par \par Left parotid mass resolving on US, so not biopsied\par Left thyroid nodule [facility] FNA (6/13/2019) was nondiagnostic\par \par \par US THYROID (3/29/2019) at [facility]:\par - RIGHT thyroid lobe 4.5 x 1.0 x 1.9 cm, homogeneous, with 3 cysts\par  -- 0.2 x 0.2 x 0.2 cm cyst in midpole\par  -- 0.4 x 0.3 x 0.4 cm complex cyst with internal septations and debris in mid pole\par  -- 0.4 x 0.2 x 0.3 cm cyst with internal septation in the lower pole adjacent to isthmus\par - LEFT thyroid lobe 4.9 x 1.1 x 1.7 cm, homogeneous\par  -- 0.5 x 0.3 x 0.4 cm cyst in upper pole\par  -- 1.9 x 0.7 x 0.8 cm complex cystic and solid, including eccentric anterior solid component measuring 1.7 cm, hypoechoic; no microcalcifications\par - ISTHMUS 0.2 cm with no nodules\par - CERVICAL LYMPH NODES: No abnormal lymph nodes identified in neck\par - PARATHYROID GLANDS: Not visualized\par \par CT CHEST with iv contrast (3/27/2019) at [facility]:\par -- Comparison to CT 11/28/2017\par -- Interval development of significant mediastinal and bilateral hilar lymphadenopathy. \par  -- Right paratracheal lymph node, 1.9 cm. \par  -- Enlarged subcarinal lymph node, 1.8 cm\par  -- Enlarged bilateral hilar lymph nodes, up to 1.8 cm on right\par  -- No significant axillary adenopathy.\par - Biapical scarring, 5 mm nodule in RLL (previously 4 mm)\par - Enlarged paraesophageal lymph node , 1.1 cm\par - Few bilateral subcm hypodense thyroid nodules, up to 0.9 cm\par - Otherwise unremarkable\par

## 2019-07-30 NOTE — ASSESSMENT
[FreeTextEntry1] : Ms. Branch was evaluated for the following:\par \par 1.) Nondiagnostic [facility] FNA of left thyroid nodule - [facility] changed procedures so that there is no onsite cytologic review of thyroid FNAs in radiology suite, as per Dr. Oneal of cytology by  phone.  She suggested that I reorder FNA and request cytology to be there.\par 2.) Left parotid cyst resolving\par 3.) New acute exacerbation of chronic sinusitis\par 4) Reflux\par \par PLAN:\par - repeat [facility] FNA of left thyroid nodule\par - antibiotics; continue fluticasone nasal spray\par - pantoprazole, reflux precautions\par \par Return after [facility] FNA

## 2019-07-30 NOTE — PHYSICAL EXAM
[Midline] : trachea located in midline position [Normal] : no neck adenopathy [FreeTextEntry1] : No hoarseness. [de-identified] : No tail of parotid firmness/mass on left side palpated today. Parotid is nontender. [de-identified] : Fullness left thyroid gland. [de-identified] : inferior turbinate hypertrophy and edema, R>>L. [de-identified] : Cloudy mucus in right MM into NP [de-identified] : 1+ bilateral

## 2019-08-01 ENCOUNTER — APPOINTMENT (OUTPATIENT)
Dept: GASTROENTEROLOGY | Facility: CLINIC | Age: 59
End: 2019-08-01
Payer: COMMERCIAL

## 2019-08-01 VITALS
HEART RATE: 97 BPM | SYSTOLIC BLOOD PRESSURE: 110 MMHG | OXYGEN SATURATION: 97 % | WEIGHT: 148 LBS | RESPIRATION RATE: 16 BRPM | DIASTOLIC BLOOD PRESSURE: 80 MMHG | HEIGHT: 63 IN | BODY MASS INDEX: 26.22 KG/M2

## 2019-08-01 PROCEDURE — 99214 OFFICE O/P EST MOD 30 MIN: CPT

## 2019-08-01 NOTE — HISTORY OF PRESENT ILLNESS
[de-identified] : 58F with PMHx DM type 2 DM, sarcoidosis on steroids, Hep B core antibody positive here to follow up. \par \par Pt reports that she feels well from sarcoid standpoint- no longer having chest pain or sob. She has more energy. \par Tolerating tenofovir - denies abd pain, nausea, vomiting, jaundice. Reports one BM a day, bristol 4, no blood or mucous.\par

## 2019-08-01 NOTE — ASSESSMENT
[FreeTextEntry1] : 58F with PMHx DM type 2, sarcoidosis, Hep B core antibody positive here for follow up \par \par Hep B reactivation PPx:\par -on steroids (+ PPI) for sarcoidosis, currently being tappered by Dr. Barnes. Given Hep B core ab, she is at moderate risk for reactivation, so per AGA guidelines recommend ppx- she is on tenofovir and tolerating it well.\par -will continue with ppx for 6 months after completion of immunosuppressants\par -will check CMP today- LFTs one month ago were normal. If elevated, will check Hep B DNA\par -MRI shows no evidence of granulomatosis disease \par \par \par Follow up in 3 months\par

## 2019-08-01 NOTE — PHYSICAL EXAM
[General Appearance - Alert] : alert [General Appearance - In No Acute Distress] : in no acute distress [General Appearance - Well Nourished] : well nourished [Sclera] : the sclera and conjunctiva were normal [Outer Ear] : the ears and nose were normal in appearance [Neck Appearance] : the appearance of the neck was normal [Oropharynx] : the oropharynx was normal [Heart Rate And Rhythm] : heart rate was normal and rhythm regular [Edema] : there was no peripheral edema [Bowel Sounds] : normal bowel sounds [Abdomen Soft] : soft [Abnormal Walk] : normal gait [Skin Turgor] : normal skin turgor [Skin Color & Pigmentation] : normal skin color and pigmentation [Oriented To Time, Place, And Person] : oriented to person, place, and time [] : no hepato-splenomegaly [Abdomen Tenderness] : non-tender [Abdomen Hernia] : no hernia was discovered [Abdomen Mass (___ Cm)] : no abdominal mass palpated [No Focal Deficits] : no focal deficits

## 2019-08-02 LAB
ALBUMIN SERPL ELPH-MCNC: 4.3 G/DL
ALP BLD-CCNC: 95 U/L
ALT SERPL-CCNC: 33 U/L
ANION GAP SERPL CALC-SCNC: 15 MMOL/L
AST SERPL-CCNC: 20 U/L
BILIRUB SERPL-MCNC: 0.3 MG/DL
BUN SERPL-MCNC: 22 MG/DL
CALCIUM SERPL-MCNC: 9.6 MG/DL
CHLORIDE SERPL-SCNC: 103 MMOL/L
CO2 SERPL-SCNC: 24 MMOL/L
CREAT SERPL-MCNC: 0.59 MG/DL
GLUCOSE SERPL-MCNC: 169 MG/DL
POTASSIUM SERPL-SCNC: 5.4 MMOL/L
PROT SERPL-MCNC: 6.6 G/DL
SODIUM SERPL-SCNC: 142 MMOL/L

## 2019-08-28 ENCOUNTER — APPOINTMENT (OUTPATIENT)
Dept: PULMONOLOGY | Facility: CLINIC | Age: 59
End: 2019-08-28
Payer: COMMERCIAL

## 2019-08-28 VITALS
TEMPERATURE: 97.8 F | BODY MASS INDEX: 27.29 KG/M2 | OXYGEN SATURATION: 98 % | DIASTOLIC BLOOD PRESSURE: 80 MMHG | WEIGHT: 154 LBS | SYSTOLIC BLOOD PRESSURE: 96 MMHG | HEIGHT: 63 IN | HEART RATE: 76 BPM

## 2019-08-28 PROCEDURE — 99214 OFFICE O/P EST MOD 30 MIN: CPT | Mod: 25

## 2019-08-28 PROCEDURE — 94060 EVALUATION OF WHEEZING: CPT

## 2019-08-28 PROCEDURE — 94729 DIFFUSING CAPACITY: CPT

## 2019-08-28 PROCEDURE — 71046 X-RAY EXAM CHEST 2 VIEWS: CPT

## 2019-08-28 PROCEDURE — 94727 GAS DIL/WSHOT DETER LNG VOL: CPT

## 2019-08-28 NOTE — HISTORY OF PRESENT ILLNESS
[FreeTextEntry1] : 05/06/2019: Asked to evaluate patient by Dr Myers for sarcoidosis. Here w daughter. Started losing weight about 3 mos ago, 30 lbs. CXR showed adenopathy, and mediastinoscopy showed noncaseating granulomas. Additional symptoms include muscle cramps, dry mouth, change in taste, fatigue. + chest discomfort in chest, no palps. Coughing since biopsy, dry. No vision changes. No arthritis/arthralgia. No EN. No fever but did have chills at the beginning. She is diabetic and Invokana was just added; remains on metformin.  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 Woke up yesterday w a R back pain going through to front, slightly better now. Coughing less at night but still disturbed at night some nights. Having muscle cramps in toes. Fingers twitches. No fever or chills. Still no taste for food. Main change is improvement in energy.  this morning. On Invokana and metformin. Has not yet obtained HBV meds from pharmacy. Working full time.\par \par 6/11/19: Energy improving. Cough less but still present. Ophtho ok. Neuro - symptoms probably sarcoid related. Still not much taste. Not checking blood glucose. Weight stable to up ~3 lbs. Definitely better - can work all day without resting. Almost back to normal but not quite.\par \par 7/9/19: In the interim she was treated with Augmentin for sinusitis and is still on that and her cough got much better; still w a little PND. Overall she is getting better every day in terms of energy and well being, weight stable, working full time. Had to add another med for glycemic control. She got the tenofovir from Dr Gayle.\par \par 8/28/19: Dropped to 15mg prednisone about a month ago and for the past 2 weeks she feels congested and at night she hears wheezing and feels like she needs to clear sputum that she can't clear. She feels like it's a chest cold, but denies any other symptoms - no sore throat, no runny nose. No sick contacts. No dyspnea. No fever, no weight loss, energy is fairly good. 90% better from before steroids.

## 2019-08-28 NOTE — PHYSICAL EXAM
[General Appearance - Well Developed] : well developed [General Appearance - Well Nourished] : well nourished [General Appearance - In No Acute Distress] : no acute distress [Normal Oropharynx] : normal oropharynx [Heart Rate And Rhythm] : heart rate and rhythm were normal [Heart Sounds] : normal S1 and S2 [Murmurs] : no murmurs present [Auscultation Breath Sounds / Voice Sounds] : lungs were clear to auscultation bilaterally [Edema] : no peripheral edema present

## 2019-08-28 NOTE — ASSESSMENT
[FreeTextEntry1] : Data reviewed:\par \par ECG and echo Dr Contreras 4/2019 - ok\par \par CT chest [facility] 3/27/19: Interval development of significant mediastinal and bilateral hilar lymphadenopathy as above which is of uncertain etiology. Neoplastic etiologies including lymphoma should be considered. 5 mm nodule in the right lower lobe, previously measuring 4 mm. Enlarged paraesophageal lymph node also noted. Few bilateral subcentimeter hypodense thyroid nodules. Consider follow-up and correlation with ultrasound. \par \par PET 4/12/19: activity in adenopathy and nonspecific activity in distal pancreas\par \par PA/lat CXR in office 5/21/19: interval improvement in R paratracheal and hilar prominence\par PA/lat CXR in office 6/11/19: stable to slightly better\par PA/lat CXR in office 8/28/19: no sig change, no new infiltrate\par \par Surg path 4/17/19: nonnecrotizing granulomas / fungal & AFB stains neg / flow neg\par \par [place] 05/7/2019 : normal\par PFT 5/7/19: nonspecific ventilatory abnormality, no BD response, TLC 89%, DLCO 70%\par [place] 6/11/19: normal\par [place] 8/28/19: mild restriction, near normal\par PFT 8/28/19: no obstruction, no BD response, mild restriction, TLC 76%, DLCO 71%\par \par Impression:\par Sarcoidosis, symptomatic, on pred 30mg since 5/7/19, 25mg since 6/11, 20mg, now 15mg since about 8/1/19\par \par Plan:\par Markedly improved on prednisone overall but with new cough and congestion without evidence of airway obstruction. Will trial Symbicort in the short term. If symptoms resolve, drop prednisone to 10mg and return in 2 mos. If cough persists, though, then maintain 15mg and call me.\par Ophtho eval 5/28/19.

## 2019-09-03 ENCOUNTER — EMERGENCY (EMERGENCY)
Facility: HOSPITAL | Age: 59
LOS: 1 days | Discharge: HOME | End: 2019-09-03
Attending: EMERGENCY MEDICINE | Admitting: EMERGENCY MEDICINE
Payer: COMMERCIAL

## 2019-09-03 ENCOUNTER — RX RENEWAL (OUTPATIENT)
Age: 59
End: 2019-09-03

## 2019-09-03 VITALS
TEMPERATURE: 98 F | HEIGHT: 63 IN | RESPIRATION RATE: 18 BRPM | HEART RATE: 94 BPM | DIASTOLIC BLOOD PRESSURE: 83 MMHG | SYSTOLIC BLOOD PRESSURE: 133 MMHG | WEIGHT: 151.9 LBS

## 2019-09-03 DIAGNOSIS — Z41.9 ENCOUNTER FOR PROCEDURE FOR PURPOSES OTHER THAN REMEDYING HEALTH STATE, UNSPECIFIED: Chronic | ICD-10-CM

## 2019-09-03 DIAGNOSIS — Z98.890 OTHER SPECIFIED POSTPROCEDURAL STATES: Chronic | ICD-10-CM

## 2019-09-03 LAB
ALBUMIN SERPL ELPH-MCNC: 4 G/DL — SIGNIFICANT CHANGE UP (ref 3.3–5)
ALBUMIN SERPL ELPH-MCNC: 4.1 G/DL — SIGNIFICANT CHANGE UP (ref 3.3–5)
ALP SERPL-CCNC: 93 U/L — SIGNIFICANT CHANGE UP (ref 40–120)
ALP SERPL-CCNC: SIGNIFICANT CHANGE UP U/L (ref 40–120)
ALT FLD-CCNC: 24 U/L — SIGNIFICANT CHANGE UP (ref 10–45)
ALT FLD-CCNC: SIGNIFICANT CHANGE UP U/L (ref 10–45)
ANION GAP SERPL CALC-SCNC: 11 MMOL/L — SIGNIFICANT CHANGE UP (ref 5–17)
ANION GAP SERPL CALC-SCNC: 11 MMOL/L — SIGNIFICANT CHANGE UP (ref 5–17)
APTT BLD: 29.4 SEC — SIGNIFICANT CHANGE UP (ref 27.5–36.3)
AST SERPL-CCNC: 20 U/L — SIGNIFICANT CHANGE UP (ref 10–40)
AST SERPL-CCNC: SIGNIFICANT CHANGE UP U/L (ref 10–40)
BASOPHILS # BLD AUTO: 0.02 K/UL — SIGNIFICANT CHANGE UP (ref 0–0.2)
BASOPHILS NFR BLD AUTO: 0.3 % — SIGNIFICANT CHANGE UP (ref 0–2)
BILIRUB SERPL-MCNC: 0.2 MG/DL — SIGNIFICANT CHANGE UP (ref 0.2–1.2)
BILIRUB SERPL-MCNC: 0.3 MG/DL — SIGNIFICANT CHANGE UP (ref 0.2–1.2)
BUN SERPL-MCNC: 15 MG/DL — SIGNIFICANT CHANGE UP (ref 7–23)
BUN SERPL-MCNC: 17 MG/DL — SIGNIFICANT CHANGE UP (ref 7–23)
CALCIUM SERPL-MCNC: 9.4 MG/DL — SIGNIFICANT CHANGE UP (ref 8.4–10.5)
CALCIUM SERPL-MCNC: 9.7 MG/DL — SIGNIFICANT CHANGE UP (ref 8.4–10.5)
CHLORIDE SERPL-SCNC: 101 MMOL/L — SIGNIFICANT CHANGE UP (ref 96–108)
CHLORIDE SERPL-SCNC: 103 MMOL/L — SIGNIFICANT CHANGE UP (ref 96–108)
CK MB CFR SERPL CALC: 2.4 NG/ML — SIGNIFICANT CHANGE UP (ref 0–6.7)
CO2 SERPL-SCNC: 23 MMOL/L — SIGNIFICANT CHANGE UP (ref 22–31)
CO2 SERPL-SCNC: 25 MMOL/L — SIGNIFICANT CHANGE UP (ref 22–31)
CREAT SERPL-MCNC: 0.54 MG/DL — SIGNIFICANT CHANGE UP (ref 0.5–1.3)
CREAT SERPL-MCNC: 0.55 MG/DL — SIGNIFICANT CHANGE UP (ref 0.5–1.3)
EOSINOPHIL # BLD AUTO: 0.02 K/UL — SIGNIFICANT CHANGE UP (ref 0–0.5)
EOSINOPHIL NFR BLD AUTO: 0.3 % — SIGNIFICANT CHANGE UP (ref 0–6)
GLUCOSE SERPL-MCNC: 214 MG/DL — HIGH (ref 70–99)
GLUCOSE SERPL-MCNC: 275 MG/DL — HIGH (ref 70–99)
HCT VFR BLD CALC: 44 % — SIGNIFICANT CHANGE UP (ref 34.5–45)
HGB BLD-MCNC: 14 G/DL — SIGNIFICANT CHANGE UP (ref 11.5–15.5)
IMM GRANULOCYTES NFR BLD AUTO: 0.4 % — SIGNIFICANT CHANGE UP (ref 0–1.5)
INR BLD: 0.95 — SIGNIFICANT CHANGE UP (ref 0.88–1.16)
LYMPHOCYTES # BLD AUTO: 1.32 K/UL — SIGNIFICANT CHANGE UP (ref 1–3.3)
LYMPHOCYTES # BLD AUTO: 17.3 % — SIGNIFICANT CHANGE UP (ref 13–44)
MCHC RBC-ENTMCNC: 30.2 PG — SIGNIFICANT CHANGE UP (ref 27–34)
MCHC RBC-ENTMCNC: 31.8 GM/DL — LOW (ref 32–36)
MCV RBC AUTO: 94.8 FL — SIGNIFICANT CHANGE UP (ref 80–100)
MONOCYTES # BLD AUTO: 0.57 K/UL — SIGNIFICANT CHANGE UP (ref 0–0.9)
MONOCYTES NFR BLD AUTO: 7.5 % — SIGNIFICANT CHANGE UP (ref 2–14)
NEUTROPHILS # BLD AUTO: 5.67 K/UL — SIGNIFICANT CHANGE UP (ref 1.8–7.4)
NEUTROPHILS NFR BLD AUTO: 74.2 % — SIGNIFICANT CHANGE UP (ref 43–77)
NRBC # BLD: 0 /100 WBCS — SIGNIFICANT CHANGE UP (ref 0–0)
PLATELET # BLD AUTO: 246 K/UL — SIGNIFICANT CHANGE UP (ref 150–400)
POTASSIUM SERPL-MCNC: 4.8 MMOL/L — SIGNIFICANT CHANGE UP (ref 3.5–5.3)
POTASSIUM SERPL-MCNC: SIGNIFICANT CHANGE UP MMOL/L (ref 3.5–5.3)
POTASSIUM SERPL-SCNC: 4.8 MMOL/L — SIGNIFICANT CHANGE UP (ref 3.5–5.3)
POTASSIUM SERPL-SCNC: SIGNIFICANT CHANGE UP MMOL/L (ref 3.5–5.3)
PROT SERPL-MCNC: 6.8 G/DL — SIGNIFICANT CHANGE UP (ref 6–8.3)
PROT SERPL-MCNC: 7.2 G/DL — SIGNIFICANT CHANGE UP (ref 6–8.3)
PROTHROM AB SERPL-ACNC: 10.7 SEC — SIGNIFICANT CHANGE UP (ref 10–12.9)
RBC # BLD: 4.64 M/UL — SIGNIFICANT CHANGE UP (ref 3.8–5.2)
RBC # FLD: 12.7 % — SIGNIFICANT CHANGE UP (ref 10.3–14.5)
SODIUM SERPL-SCNC: 135 MMOL/L — SIGNIFICANT CHANGE UP (ref 135–145)
SODIUM SERPL-SCNC: 139 MMOL/L — SIGNIFICANT CHANGE UP (ref 135–145)
TROPONIN T SERPL-MCNC: <0.01 NG/ML — SIGNIFICANT CHANGE UP (ref 0–0.01)
WBC # BLD: 7.63 K/UL — SIGNIFICANT CHANGE UP (ref 3.8–10.5)
WBC # FLD AUTO: 7.63 K/UL — SIGNIFICANT CHANGE UP (ref 3.8–10.5)

## 2019-09-03 PROCEDURE — 85025 COMPLETE CBC W/AUTO DIFF WBC: CPT

## 2019-09-03 PROCEDURE — 99284 EMERGENCY DEPT VISIT MOD MDM: CPT | Mod: 25

## 2019-09-03 PROCEDURE — 85730 THROMBOPLASTIN TIME PARTIAL: CPT

## 2019-09-03 PROCEDURE — 36415 COLL VENOUS BLD VENIPUNCTURE: CPT

## 2019-09-03 PROCEDURE — 82553 CREATINE MB FRACTION: CPT

## 2019-09-03 PROCEDURE — 99285 EMERGENCY DEPT VISIT HI MDM: CPT | Mod: 25

## 2019-09-03 PROCEDURE — 82550 ASSAY OF CK (CPK): CPT

## 2019-09-03 PROCEDURE — 74177 CT ABD & PELVIS W/CONTRAST: CPT

## 2019-09-03 PROCEDURE — 80053 COMPREHEN METABOLIC PANEL: CPT

## 2019-09-03 PROCEDURE — 84484 ASSAY OF TROPONIN QUANT: CPT

## 2019-09-03 PROCEDURE — 74177 CT ABD & PELVIS W/CONTRAST: CPT | Mod: 26

## 2019-09-03 PROCEDURE — 85610 PROTHROMBIN TIME: CPT

## 2019-09-03 PROCEDURE — 71046 X-RAY EXAM CHEST 2 VIEWS: CPT

## 2019-09-03 PROCEDURE — 93005 ELECTROCARDIOGRAM TRACING: CPT

## 2019-09-03 PROCEDURE — 83690 ASSAY OF LIPASE: CPT

## 2019-09-03 PROCEDURE — 71046 X-RAY EXAM CHEST 2 VIEWS: CPT | Mod: 26

## 2019-09-03 PROCEDURE — 93010 ELECTROCARDIOGRAM REPORT: CPT

## 2019-09-03 PROCEDURE — 96374 THER/PROPH/DIAG INJ IV PUSH: CPT | Mod: XU

## 2019-09-03 RX ORDER — SODIUM CHLORIDE 9 MG/ML
1000 INJECTION INTRAMUSCULAR; INTRAVENOUS; SUBCUTANEOUS
Refills: 0 | Status: DISCONTINUED | OUTPATIENT
Start: 2019-09-03 | End: 2019-09-11

## 2019-09-03 RX ORDER — SODIUM CHLORIDE 9 MG/ML
1000 INJECTION INTRAMUSCULAR; INTRAVENOUS; SUBCUTANEOUS ONCE
Refills: 0 | Status: COMPLETED | OUTPATIENT
Start: 2019-09-03 | End: 2019-09-03

## 2019-09-03 RX ORDER — IOHEXOL 300 MG/ML
30 INJECTION, SOLUTION INTRAVENOUS ONCE
Refills: 0 | Status: COMPLETED | OUTPATIENT
Start: 2019-09-03 | End: 2019-09-03

## 2019-09-03 RX ORDER — FAMOTIDINE 10 MG/ML
20 INJECTION INTRAVENOUS ONCE
Refills: 0 | Status: COMPLETED | OUTPATIENT
Start: 2019-09-03 | End: 2019-09-03

## 2019-09-03 RX ORDER — LIDOCAINE 4 G/100G
10 CREAM TOPICAL ONCE
Refills: 0 | Status: COMPLETED | OUTPATIENT
Start: 2019-09-03 | End: 2019-09-03

## 2019-09-03 RX ORDER — ONDANSETRON 8 MG/1
4 TABLET, FILM COATED ORAL ONCE
Refills: 0 | Status: DISCONTINUED | OUTPATIENT
Start: 2019-09-03 | End: 2019-09-11

## 2019-09-03 RX ADMIN — Medication 30 MILLILITER(S): at 20:23

## 2019-09-03 RX ADMIN — LIDOCAINE 10 MILLILITER(S): 4 CREAM TOPICAL at 20:23

## 2019-09-03 RX ADMIN — SODIUM CHLORIDE 125 MILLILITER(S): 9 INJECTION INTRAMUSCULAR; INTRAVENOUS; SUBCUTANEOUS at 20:23

## 2019-09-03 RX ADMIN — SODIUM CHLORIDE 1000 MILLILITER(S): 9 INJECTION INTRAMUSCULAR; INTRAVENOUS; SUBCUTANEOUS at 20:23

## 2019-09-03 RX ADMIN — FAMOTIDINE 20 MILLIGRAM(S): 10 INJECTION INTRAVENOUS at 20:23

## 2019-09-03 RX ADMIN — IOHEXOL 30 MILLILITER(S): 300 INJECTION, SOLUTION INTRAVENOUS at 20:23

## 2019-09-03 NOTE — ED ADULT NURSE NOTE - OBJECTIVE STATEMENT
58y F, A&ox3, presents to ed for left sided cp starting yesterday. Reports pain relieved however pain returned this am around 1000. Reports mild sob. Noted cough (states cough for weeks) no leg swelling. Lungs clear bilateral, no jvd. EKG performed, lab drawn, iv placed. Will continue to monitor.

## 2019-09-03 NOTE — ED ADULT NURSE NOTE - NSIMPLEMENTINTERV_GEN_ALL_ED
Implemented All Universal Safety Interventions:  [place] to call system. Call bell, personal items and telephone within reach. Instruct patient to call for assistance. Room bathroom lighting operational. Non-slip footwear when patient is off stretcher. Physically safe environment: no spills, clutter or unnecessary equipment. Stretcher in lowest position, wheels locked, appropriate side rails in place.

## 2019-09-03 NOTE — ED PROVIDER NOTE - CLINICAL SUMMARY MEDICAL DECISION MAKING FREE TEXT BOX
Pt c/o upper abd pain v severe yest then improved and returned this am w/o associated sx.  EKG nl, troponin neg after > 6 h of pain, no sig concern for atypical acs.  Suspect gi since ttp on exam.  ? pud, pancreatitis, cholecystitis/biliary colic, doubt kidney stone.  Pt declined pain meds.  Will try gi meds.  CMP hemolyzed - repeat sent, lipase added.  Plan ct abd for further eval.

## 2019-09-03 NOTE — ED PROVIDER NOTE - NSFOLLOWUPINSTRUCTIONS_ED_ALL_ED_FT
Abdominal Pain  Possible gastritis    Take omeprazole 1 tab once a day.  Add tums/maalox/mylanta etc as directed for additional relief.  Avoid spicy and/or acidic foods (citrus, tomatoes, etc), alcohol, caffeine, and NSAID medications (ibuprofen, aleve, advil, motrin, etc).  Follow up with your pmd and a gastroenterologist.  Return for increased pain, vomiting or diarrhea, fever, black/bloody stools, any other concerns.     Follow up with your pmd and GI doctor.    Gastritis    Gastritis is soreness and swelling (inflammation) of the lining of the stomach. Gastritis can develop as a sudden onset (acute) or long-term (chronic) condition. If gastritis is not treated, it can lead to stomach bleeding and ulcers. Causes include viral and bacterial infections, excessive alcohol consumption, tobacco use, or certain medications. Symptoms include nausea, vomiting, or abdominal pain or burning especially after eating. Avoid foods or drinks that make your symptoms worse such as caffeine, chocolate, spicy foods, acidic foods, or alcohol.    SEEK IMMEDIATE MEDICAL CARE IF YOU HAVE ANY OF THE FOLLOWING SYMPTOMS: black or bloody stools, blood or coffee-ground-colored vomitus, worsening abdominal pain, fever, or inability to keep fluids down.     Abdominal Pain    Many things can cause abdominal pain. Many times, abdominal pain is not caused by a disease and will improve without treatment. Your health care provider will do a physical exam to determine if there is a dangerous cause of your pain; blood tests and imaging may help determine the cause of your pain. However, in many cases, no cause may be found and you may need further testing as an outpatient. Monitor your abdominal pain for any changes.     SEEK IMMEDIATE MEDICAL CARE IF YOU HAVE ANY OF THE FOLLOWING SYMPTOMS: worsening abdominal pain, uncontrollable vomiting, profuse diarrhea, inability to have bowel movements or pass gas, black or bloody stools, fever accompanying chest pain or back pain, or fainting. These symptoms may represent a serious problem that is an emergency. Do not wait to see if the symptoms will go away. Get medical help right away. Call 911 and do not drive yourself to the hospital.

## 2019-09-03 NOTE — ED PROVIDER NOTE - OBJECTIVE STATEMENT
57 yo female h/o sarcoid on prednisone daily, dm, kidney stone, hep b, thyroid nodule c/o epigastric pain radiating to luq w/o associated n/v/d, black/bloody stools, dysuria, hematuria, flank pain.  No h/o pud, gallstones, etoh abuse, pancreatitis.  Pt feels increased pain w movement, deep breath and some increase w eating.  No gerd.  Pain 8/10.  No meds pta for sx.  No fever. Pain is sharp.

## 2019-09-03 NOTE — ED ADULT TRIAGE NOTE - CHIEF COMPLAINT QUOTE
Patient c/o of left sided chest and breast pain started yesterday afternoon, very severe then, resolved on its own, noted chest pain again today at 10am while at work.  Denies any SOB, no nausea/vomitting.  PMHx. Sarcoidosis, denies cardiac Hx.  EKG done and reviewed by Dr. Gomez.

## 2019-09-03 NOTE — ED PROVIDER NOTE - NEUROLOGICAL, MLM
Alert and oriented, no focal deficits, no motor or sensory deficits. received Tamiflu on 2-6-2018/none received Tamiflu on 2-6-2018/Patient/surrogate refused vaccine

## 2019-09-03 NOTE — ED PROVIDER NOTE - CPE EDP CARDIAC NORM
History of Present Illness





- Reason for Consult


Consult date: 01/17/17


Abdominal pain


Requesting physician: SANDY LAMBERT





- History of Present Illness


Asked to see this pleasant 45yo man with ESRD on HD for evaluation of 

epigastric pain.  He is on phosphate binders and denies any NSAID usage.  No 

hematemesis, vomiting, melena, hematochezia.  He was started on Pepcid and 

states "I'm really feeling better than I did yesterday."  No dysphagia, CP/SOB.








Past History


Past Medical History: ESRD, hypertension


Past Surgical History: denies: No surgical history


Social history: lives with family.  denies: smoking, alcohol abuse, IV drug use


Family history: denies: CAD, stroke





Medications and Allergies


 Allergies











Allergy/AdvReac Type Severity Reaction Status Date / Time


 


No Known Allergies Allergy   Unverified 01/16/17 08:22











 Home Medications











 Medication  Instructions  Recorded  Confirmed  Last Taken  Type


 


Carvedilol [Coreg] 25 mg PO QDAY 01/16/17 01/16/17 01/15/17 History


 


Furosemide [Lasix TAB] 80 mg PO 4XW 01/16/17 01/16/17 01/15/17 History


 


Minoxidil [Loniten] 2.5 mg PO QDAY 01/16/17 01/16/17 01/15/17 History


 


NIFEdipine XL [Procardia Xl] 90 mg PO QDAY 01/16/17 01/16/17 01/15/17 History


 


Warfarin [Coumadin] 7.5 mg PO QDAY 01/16/17 01/16/17 01/15/17 History


 


cloNIDine-TTS PATCH [Catapres-Tts 1 patch TD Q7D 01/16/17 01/16/17 Unknown 

History





Patch]     


 


hydrALAZINE [Apresoline] 50 mg PO Q8HR 01/16/17 01/16/17 01/15/17 History











Active Meds: 


Active Medications





Carvedilol (Coreg)  25 mg PO QDAY [facility]


   Last Admin: 01/17/17 09:37 Dose:  Not Given


Clonidine HCl (Catapres-Tts Patch)  0.3 mg TD Q7D [facility]


   Last Admin: 01/16/17 22:00 Dose:  Not Given


Famotidine (Pepcid)  10 mg PO BID ARRON


Furosemide (Lasix)  80 mg PO SuMoWeFr [facility]


   Last Admin: 01/16/17 21:44 Dose:  80 mg


Hydralazine HCl (Apresoline)  50 mg PO Q8HR [facility]


   Last Admin: 01/17/17 14:00 Dose:  Not Given


Sodium Chloride (Nacl 0.9% 1000 Ml)  100 mls @ 999 mls/hr IV QUITA PRN


   PRN Reason: Hypotension


Minoxidil (Loniten)  2.5 mg PO QDAY [facility]


   Last Admin: 01/17/17 09:34 Dose:  Not Given


Nifedipine (Procardia Xl)  90 mg PO QDAY [facility]


   Last Admin: 01/17/17 09:35 Dose:  Not Given











Review of Systems





- Review of Systems


All systems: negative (epig pain)





Exam





- Constitutional


Vital Signs: 


 











Temp Pulse Resp BP Pulse Ox


 


 98.4 F   86   18   140/80   98 


 


 01/17/17 17:00  01/17/17 17:00  01/17/17 17:00  01/17/17 17:00  01/17/17 08:00











General appearance: no acute distress





- Neck


Neck: supple





- Respiratory


Respiratory: bilateral: CTA





- Cardiovascular


Rhythm: regular


Heart Sounds: Present: S1 & S2


Extremities: No edema





- Gastrointestinal


General gastrointestinal: Present: soft, non-tender, non-distended, normal 

bowel sounds





- Neurologic


Neurological: alert and oriented x3





- Psychiatric


Psychiatric: appropriate mood/affect





- Labs


CBC & Chem 7: 


 01/16/17 09:00





 01/16/17 09:00





Assessment and Plan


45yo man with improving epigastric pain, likely related to dyspepsia/gastritis 

from PO4 binders.





Rec:


1) Would continue Pepcid for 2 weeks, then d/c


2) Given his improvement, I would not recommend an inpatient GI endoscopy at 

this time





He may f/u with us in our [place] location upon discharge, should his issue re

-occur.  I will stop following daily, but please do not hesitate to contact me 

with any questions. normal...

## 2019-09-03 NOTE — ED PROVIDER NOTE - PATIENT PORTAL LINK FT
You can access the FollowMyHealth Patient Portal offered by [facility] by registering at the following website: [email or web address]. By joining [email or web address]’s FollowMyHealth portal, you will also be able to view your health information using other applications (apps) compatible with our system.

## 2019-09-03 NOTE — ED PROVIDER NOTE - DIAGNOSTIC INTERPRETATION
ER Physician:  Amita Director  CHEST XRAY INTERPRETATION: lungs clear, heart shadow normal, bony structures intact

## 2019-09-03 NOTE — ED ADULT NURSE NOTE - CHPI ED NUR SYMPTOMS NEG
no vomiting/no fever/no back pain/no chills/no diaphoresis/no nausea/no syncope/no congestion/no dizziness

## 2019-09-03 NOTE — ED PROVIDER NOTE - PROGRESS NOTE DETAILS
Pt feeling somewhat better after meds, ct w/o acute abd process, cxr w/o acute process on my read.  Will dc to fu pmd, gi as outpt.

## 2019-09-03 NOTE — ED PROVIDER NOTE - CARE PROVIDER_API CALL
Deepak Olivera)  Internal Medicine  [address] [place] [address]  Phone: [phone]  Fax: [phone]  Follow Up Time:

## 2019-09-04 VITALS
TEMPERATURE: 98 F | RESPIRATION RATE: 16 BRPM | SYSTOLIC BLOOD PRESSURE: 130 MMHG | OXYGEN SATURATION: 98 % | DIASTOLIC BLOOD PRESSURE: 70 MMHG | HEART RATE: 70 BPM

## 2019-09-04 RX ORDER — OMEPRAZOLE 10 MG/1
1 CAPSULE, DELAYED RELEASE ORAL
Qty: 30 | Refills: 0
Start: 2019-09-04 | End: 2019-10-03

## 2019-09-05 ENCOUNTER — APPOINTMENT (OUTPATIENT)
Dept: INTERNAL MEDICINE | Facility: CLINIC | Age: 59
End: 2019-09-05
Payer: COMMERCIAL

## 2019-09-05 VITALS
RESPIRATION RATE: 16 BRPM | TEMPERATURE: 98.4 F | HEART RATE: 87 BPM | OXYGEN SATURATION: 98 % | HEIGHT: 63 IN | BODY MASS INDEX: 27.11 KG/M2 | SYSTOLIC BLOOD PRESSURE: 110 MMHG | DIASTOLIC BLOOD PRESSURE: 76 MMHG | WEIGHT: 153 LBS

## 2019-09-05 PROBLEM — D86.9 SARCOIDOSIS, UNSPECIFIED: Chronic | Status: ACTIVE | Noted: 2019-09-03

## 2019-09-05 PROCEDURE — 99214 OFFICE O/P EST MOD 30 MIN: CPT

## 2019-09-05 RX ORDER — AMOXICILLIN AND CLAVULANATE POTASSIUM 875; 125 MG/1; MG/1
875-125 TABLET, COATED ORAL
Qty: 42 | Refills: 0 | Status: DISCONTINUED | COMMUNITY
Start: 2019-06-25 | End: 2019-09-05

## 2019-09-05 NOTE — PHYSICAL EXAM
[No Edema] : there was no peripheral edema [Normal] : affect was normal and insight and judgment were intact [de-identified] : mild td mid epigastric and left upper quad pain [de-identified] : mild td over left ribs below breastline

## 2019-09-05 NOTE — HISTORY OF PRESENT ILLNESS
[de-identified] : Had left sided chest/abd pain for a few days - - went to ER - ACS eruled out- told she had gastritis.  \par Pain under left ribs, comes on suddenly. Today feels it but much better.  Denies any trauma,  Better lying down sleep then waking up feels better.  No relation to walking or eating.\par Also with cough for 3 weeks.  \par Stopped spicy foods.

## 2019-09-05 NOTE — PLAN
[FreeTextEntry1] : \par \par Costochondritis vs a gastric issue- gastritis or ulcer.  \par Cough may have exacerbated the issue\par Rec 2 weeks of pantoprazole daily.  \par No pain meds at this time\par F/up Dr Barnes to discuss different inhlaer options.  \par \par f/up third bx for thyroid nodule with specialist.

## 2019-09-05 NOTE — REVIEW OF SYSTEMS
[Fatigue] : fatigue [Chest Pain] : chest pain [Palpitations] : no palpitations [Leg Claudication] : no leg claudication [Orthopnea] : no orthopnea [Abdominal Pain] : abdominal pain [Nausea] : no nausea [Constipation] : no constipation [Vomiting] : no vomiting [Heartburn] : no heartburn [Negative] : Psychiatric

## 2019-09-06 ENCOUNTER — APPOINTMENT (OUTPATIENT)
Dept: PULMONOLOGY | Facility: CLINIC | Age: 59
End: 2019-09-06
Payer: COMMERCIAL

## 2019-09-06 VITALS
TEMPERATURE: 98.5 F | OXYGEN SATURATION: 98 % | WEIGHT: 153 LBS | SYSTOLIC BLOOD PRESSURE: 100 MMHG | DIASTOLIC BLOOD PRESSURE: 76 MMHG | BODY MASS INDEX: 27.11 KG/M2 | HEART RATE: 86 BPM | HEIGHT: 63 IN

## 2019-09-06 PROCEDURE — 94010 BREATHING CAPACITY TEST: CPT

## 2019-09-06 PROCEDURE — 99214 OFFICE O/P EST MOD 30 MIN: CPT | Mod: 25

## 2019-09-06 NOTE — PHYSICAL EXAM
[General Appearance - Well Developed] : well developed [General Appearance - Well Nourished] : well nourished [General Appearance - In No Acute Distress] : no acute distress [Heart Sounds] : normal S1 and S2 [Heart Rate And Rhythm] : heart rate and rhythm were normal [Murmurs] : no murmurs present [Edema] : no peripheral edema present [Abdomen Tenderness] : non-tender [Auscultation Breath Sounds / Voice Sounds] : lungs were clear to auscultation bilaterally

## 2019-09-10 NOTE — HISTORY OF PRESENT ILLNESS
[FreeTextEntry1] : 05/06/2019: Asked to evaluate patient by Dr Myers for sarcoidosis. Here w daughter. Started losing weight about 3 mos ago, 30 lbs. CXR showed adenopathy, and mediastinoscopy showed noncaseating granulomas. Additional symptoms include muscle cramps, dry mouth, change in taste, fatigue. + chest discomfort in chest, no palps. Coughing since biopsy, dry. No vision changes. No arthritis/arthralgia. No EN. No fever but did have chills at the beginning. She is diabetic and Invokana was just added; remains on metformin.  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 Woke up yesterday w a R back pain going through to front, slightly better now. Coughing less at night but still disturbed at night some nights. Having muscle cramps in toes. Fingers twitches. No fever or chills. Still no taste for food. Main change is improvement in energy.  this morning. On Invokana and metformin. Has not yet obtained HBV meds from pharmacy. Working full time.\par \par 6/11/19: Energy improving. Cough less but still present. Ophtho ok. Neuro - symptoms probably sarcoid related. Still not much taste. Not checking blood glucose. Weight stable to up ~3 lbs. Definitely better - can work all day without resting. Almost back to normal but not quite.\par \par 7/9/19: In the interim she was treated with Augmentin for sinusitis and is still on that and her cough got much better; still w a little PND. Overall she is getting better every day in terms of energy and well being, weight stable, working full time. Had to add another med for glycemic control. She got the tenofovir from Dr Gayle.\par \par 8/28/19: Dropped to 15mg prednisone about a month ago and for the past 2 weeks she feels congested and at night she hears wheezing and feels like she needs to clear sputum that she can't clear. She feels like it's a chest cold, but denies any other symptoms - no sore throat, no runny nose. No sick contacts. No dyspnea. No fever, no weight loss, energy is fairly good. 90% better from before steroids.\par \par 9/6/19: Went to ED earlier this week for L sided chest and abd pain, ACS ruled out, told she had gastritis. Saw Dr Olivera yesterday who put her on 2 weeks pantoprazole. She started some benzonatate yesterday and hasn't been able to fill the inhaler. Is on 15mg prednisone. Continues w dry cough. Less mucus. Not a whole lot better than when it started.

## 2019-09-10 NOTE — ASSESSMENT
[FreeTextEntry1] : Data reviewed:\par \par ECG and echo Dr Contreras 4/2019 - ok\par \par CT chest [facility] 3/27/19: Interval development of significant mediastinal and bilateral hilar lymphadenopathy as above which is of uncertain etiology. Neoplastic etiologies including lymphoma should be considered. 5 mm nodule in the right lower lobe, previously measuring 4 mm. Enlarged paraesophageal lymph node also noted. Few bilateral subcentimeter hypodense thyroid nodules. Consider follow-up and correlation with ultrasound. \par \par CT abd [facility] 9/3/19 personally reviewed: no abnormality in lung parenchyma; sl dec adenopathy\par PA/lat CXR [facility] 9/3/19 personally reviewed: clear\par \par Surg path 4/17/19: nonnecrotizing granulomas / fungal & AFB stains neg / flow neg\par \par [place] 05/7/2019 : normal\par PFT 5/7/19: nonspecific ventilatory abnormality, no BD response, TLC 89%, DLCO 70%\par [place] 6/11/19: normal\par [place] 8/28/19: mild restriction, near normal\par PFT 8/28/19: no obstruction, no BD response, mild restriction, TLC 76%, DLCO 71%\par [place] 9/6/19: mild restriction\par \par Impression:\par Sarcoidosis, symptomatic, on pred 30mg since 5/7/19, 25mg since 6/11, 20mg, now 15mg since about 8/1/19\par Now with LUQ pain\par \par Plan:\par For the moment she will take pantoprazole for the pain and I will give her Symbicort (sample) for the cough. We'll see how she does with that, remaining on 15mg prednisone. We may have to bump the prednisone back up to treat the cough but if the pain is gastritis, this may worsen it. We will touch base next week.\par Ophtho eval 5/28/19.\par --\par 9/10/19 note - Symbicort not covered, pref is flut-salm

## 2019-09-11 DIAGNOSIS — E11.9 TYPE 2 DIABETES MELLITUS WITHOUT COMPLICATIONS: ICD-10-CM

## 2019-09-11 DIAGNOSIS — Z79.84 LONG TERM (CURRENT) USE OF ORAL HYPOGLYCEMIC DRUGS: ICD-10-CM

## 2019-09-11 DIAGNOSIS — K29.70 GASTRITIS, UNSPECIFIED, WITHOUT BLEEDING: ICD-10-CM

## 2019-09-11 DIAGNOSIS — Z79.52 LONG TERM (CURRENT) USE OF SYSTEMIC STEROIDS: ICD-10-CM

## 2019-09-11 DIAGNOSIS — R10.13 EPIGASTRIC PAIN: ICD-10-CM

## 2019-10-01 ENCOUNTER — APPOINTMENT (OUTPATIENT)
Dept: NEUROLOGY | Facility: CLINIC | Age: 59
End: 2019-10-01

## 2019-11-13 ENCOUNTER — APPOINTMENT (OUTPATIENT)
Dept: OTOLARYNGOLOGY | Facility: CLINIC | Age: 59
End: 2019-11-13

## 2019-12-12 ENCOUNTER — APPOINTMENT (OUTPATIENT)
Dept: GASTROENTEROLOGY | Facility: CLINIC | Age: 59
End: 2019-12-12

## 2020-01-16 NOTE — ED ADULT TRIAGE NOTE - HEIGHT IN INCHES
The [employer] CDx test for Misael Moss has resulted and is available in EPIC.     Results review > pathology > molecular.      This case will be reviewed by our Molecular Tumor Board. A subsequent staff message will be sent to let you know the date on which this will occur.     Christy Arredondo   On behalf of the [facility]   
3

## 2020-01-22 ENCOUNTER — APPOINTMENT (OUTPATIENT)
Dept: OTOLARYNGOLOGY | Facility: CLINIC | Age: 60
End: 2020-01-22
Payer: COMMERCIAL

## 2020-01-22 VITALS
HEIGHT: 63 IN | SYSTOLIC BLOOD PRESSURE: 137 MMHG | WEIGHT: 157 LBS | SYSTOLIC BLOOD PRESSURE: 137 MMHG | BODY MASS INDEX: 27.82 KG/M2 | HEIGHT: 63 IN | DIASTOLIC BLOOD PRESSURE: 95 MMHG | DIASTOLIC BLOOD PRESSURE: 95 MMHG | HEART RATE: 102 BPM | BODY MASS INDEX: 27.82 KG/M2 | HEART RATE: 102 BPM | WEIGHT: 157 LBS

## 2020-01-22 PROCEDURE — 99214 OFFICE O/P EST MOD 30 MIN: CPT

## 2020-01-22 RX ORDER — PREDNISONE 5 MG/1
5 TABLET ORAL
Qty: 30 | Refills: 1 | Status: DISCONTINUED | COMMUNITY
Start: 2019-06-11 | End: 2020-01-08

## 2020-01-22 RX ORDER — TENOFOVIR DISOPROXIL FUMARATE 300 MG/1
300 TABLET ORAL DAILY
Qty: 30 | Refills: 2 | Status: COMPLETED | COMMUNITY
Start: 2019-06-19 | End: 2020-01-22

## 2020-01-22 RX ORDER — GLIPIZIDE 5 MG/1
5 TABLET, FILM COATED, EXTENDED RELEASE ORAL DAILY
Qty: 30 | Refills: 5 | Status: COMPLETED | COMMUNITY
Start: 2019-06-13 | End: 2020-01-22

## 2020-01-22 RX ORDER — PREDNISONE 10 MG/1
10 TABLET ORAL
Qty: 60 | Refills: 1 | Status: DISCONTINUED | COMMUNITY
Start: 2019-05-07 | End: 2020-01-08

## 2020-01-22 NOTE — ASSESSMENT
[FreeTextEntry1] : Ms. 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  Endoscopic sinus surgery was discussed in past; she is not interested in surgery at this time.\par 3) GERD better on lower steroid dose\par \par PLAN:\par - New US thyroid, then will see if need repeat USG FNA\par - continue fluticasone nasal spray\par - nasal saline irrigation\par \par Return after US and possible FNA\par

## 2020-01-22 NOTE — CONSULT LETTER
[Dear  ___] : Dear  [unfilled], [Sincerely,] : Sincerely, [Consult Closing:] : Thank you very much for allowing me to participate in the care of this patient.  If you have any questions, please do not hesitate to contact me. [Courtesy Letter:] : I had the pleasure of seeing your patient, [unfilled], in my office today. [FreeTextEntry1] : \par \par Enclosed please find my notes from office visit on January 22, 2020.\par  \par  [FreeTextEntry2] : Deepak Olivera MD [FreeTextEntry3] : \par Shari Parham MD \par Otolaryngology, Head and Neck Surgery \par \par

## 2020-01-22 NOTE — PHYSICAL EXAM
[de-identified] : No palpable thyroid nodule. [de-identified] : Minor nferior turbinate hypertrophy, R>L. [Midline] : trachea located in midline position [de-identified] : 1+ bilateral  [Normal] : no neck adenopathy

## 2020-01-22 NOTE — HISTORY OF PRESENT ILLNESS
[de-identified] : Ms. Branch needs to have reassessment of left thyroid nodule.\par \par She does not feel any lump in neck or throat pain.\par When prelim US for FNA was done in September 2019, no thyroid nodule seen so no biopsy was done.\par USG FNA of left thyroid nodule twice  (6/13/2019 and 7/03/2019) was nondiagnostic.\par Left parotid mass resolving on US, so not biopsied\par \par Still with chronic right postnasal drip and mucus in throat, which she has had x years.\par Treated for acute sinus exacerbation with antibiotics in 2018.\par Currently taking fluticasone nasal spray\par \par Breathing is better.  Hx of pulmonary sarcoidosis.\par Prednisone decreased to 10 mg daily.  Off glipizide since lower steroid doses.\par Reflux/heartburn better after reduced dose of prednisone.\par \par \par US NECK (9/17/2019) at [facility]:\par - Limited images of left thyroid lobe demonstrate no evidence of a nodule.  The lobe is homogeneous.\par - Biopsy cancelled\par \par USG FNA Left thyroid nodule, upper pole, 1.6 cm (7/03/2019) at [facility]:\par - Nondiagnostic\par \par USG FNA Left thyroid nodule, upper pole, 1.6 cm (6/13/2019) at [facility]:\par - Nondiagnostic (ThinPrep slid and cell block are acellular)\par \par US NECK (06/13/2019) at [facility]:\par - Comparison: MR brain 4/19/2019 \par - Targeted ultrasound images of the left parotid gland were performed. \par The parotid gland appears homogeneous and with normal vascularity. \par A 0.3 x 0.2 x 0.3 cm complex cystic lesion with an internal septation in the mid left parotid gland. \par IMPRESSION: \par There is a 0.3 cm complex cystic lesion with a septation in the left parotid gland. It has a nonspecific appearance.\par \par US THYROID (3/29/2019) at [facility]:\par - RIGHT thyroid lobe 4.5 x 1.0 x 1.9 cm, homogeneous, with 3 cysts\par  -- 0.2 x 0.2 x 0.2 cm cyst in midpole\par  -- 0.4 x 0.3 x 0.4 cm complex cyst with internal septations and debris in mid pole\par  -- 0.4 x 0.2 x 0.3 cm cyst with internal septation in the lower pole adjacent to isthmus\par - LEFT thyroid lobe 4.9 x 1.1 x 1.7 cm, homogeneous\par  -- 0.5 x 0.3 x 0.4 cm cyst in upper pole\par  -- 1.9 x 0.7 x 0.8 cm complex cystic and solid, including eccentric anterior solid component measuring 1.7 cm, hypoechoic; no microcalcifications\par - ISTHMUS 0.2 cm with no nodules\par - CERVICAL LYMPH NODES: No abnormal lymph nodes identified in neck\par - PARATHYROID GLANDS: Not visualized\par \par CT CHEST with iv contrast (3/27/2019) at [facility]:\par -- Comparison to CT 11/28/2017\par -- Interval development of significant mediastinal and bilateral hilar lymphadenopathy. \par  -- Right paratracheal lymph node, 1.9 cm. \par  -- Enlarged subcarinal lymph node, 1.8 cm\par  -- Enlarged bilateral hilar lymph nodes, up to 1.8 cm on right\par  -- No significant axillary adenopathy.\par - Biapical scarring, 5 mm nodule in RLL (previously 4 mm)\par - Enlarged paraesophageal lymph node , 1.1 cm\par - Few bilateral subcm hypodense thyroid nodules, up to 0.9 cm\par - Otherwise unremarkable\par \par

## 2020-02-04 ENCOUNTER — APPOINTMENT (OUTPATIENT)
Dept: PULMONOLOGY | Facility: CLINIC | Age: 60
End: 2020-02-04
Payer: COMMERCIAL

## 2020-02-04 VITALS
HEART RATE: 95 BPM | BODY MASS INDEX: 27.82 KG/M2 | SYSTOLIC BLOOD PRESSURE: 118 MMHG | OXYGEN SATURATION: 98 % | DIASTOLIC BLOOD PRESSURE: 88 MMHG | HEIGHT: 63 IN | TEMPERATURE: 98.5 F | WEIGHT: 157 LBS

## 2020-02-04 PROCEDURE — 99213 OFFICE O/P EST LOW 20 MIN: CPT | Mod: 25

## 2020-02-04 PROCEDURE — 94010 BREATHING CAPACITY TEST: CPT

## 2020-02-04 NOTE — ASSESSMENT
[FreeTextEntry1] : Data reviewed:\par \par ECG and echo Dr Contreras 4/2019 - ok\par \par CT chest [facility] 3/27/19: Interval development of significant mediastinal and bilateral hilar lymphadenopathy as above which is of uncertain etiology. Neoplastic etiologies including lymphoma should be considered. 5 mm nodule in the right lower lobe, previously measuring 4 mm. Enlarged paraesophageal lymph node also noted. Few bilateral subcentimeter hypodense thyroid nodules. Consider follow-up and correlation with ultrasound. \par \par CT abd [facility] 9/3/19 personally reviewed: no abnormality in lung parenchyma; sl dec adenopathy\par PA/lat CXR [facility] 9/3/19 personally reviewed: clear\par \par Surg path 4/17/19: nonnecrotizing granulomas / fungal & AFB stains neg / flow neg\par \par PFT 5/7/19: nonspecific ventilatory abnormality, no BD response, TLC 89%, DLCO 70%\par PFT 8/28/19: no obstruction, no BD response, mild restriction, TLC 76%, DLCO 71%\par Nikolai 2/4/20: normal\par \par Impression:\par Sarcoidosis, symptomatic, on pred 30mg now tapered to 10mg for months\par \par Plan:\par Pred 5mg qd x 2 weeks, 5mg qod x 2 weeks, then stop.\par Follow up 2 mos. Call if problems.\par Ophtho eval 5/28/19.\par Symbicort prn is fine.

## 2020-02-04 NOTE — HISTORY OF PRESENT ILLNESS
[TextBox_4] : 05/06/2019: Asked to evaluate patient by Dr Myers for sarcoidosis. Here w daughter. Started losing weight about 3 mos ago, 30 lbs. CXR showed adenopathy, and mediastinoscopy showed noncaseating granulomas. Additional symptoms include muscle cramps, dry mouth, change in taste, fatigue. + chest discomfort in chest, no palps. Coughing since biopsy, dry. No vision changes. No arthritis/arthralgia. No EN. No fever but did have chills at the beginning. She is diabetic and Invokana was just added; remains on metformin.  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 Started pred 30mg and Dr Gayle started HBV ppx.\par \par On prednisone energy and cough improved. Required Augmentin for sinusitis in the summer 2019. Inc wheezing on prendisone 15mg. Sept 2019 seen in ED for L chest and abd pain and given pantoprazole.\par \par 2/4/20: On 10mg prednisone since seeing me last. Doing "great." Energy is normal, feels unlimited, cough is minimal, using Symbicort prn. Remains on pantoprazole and abdominal pain has resolved.

## 2020-02-28 ENCOUNTER — RX RENEWAL (OUTPATIENT)
Age: 60
End: 2020-02-28

## 2020-05-06 ENCOUNTER — APPOINTMENT (OUTPATIENT)
Dept: INTERNAL MEDICINE | Facility: CLINIC | Age: 60
End: 2020-05-06

## 2020-05-20 ENCOUNTER — APPOINTMENT (OUTPATIENT)
Dept: PULMONOLOGY | Facility: CLINIC | Age: 60
End: 2020-05-20

## 2020-07-27 ENCOUNTER — RX RENEWAL (OUTPATIENT)
Age: 60
End: 2020-07-27

## 2020-08-07 ENCOUNTER — APPOINTMENT (OUTPATIENT)
Dept: PULMONOLOGY | Facility: CLINIC | Age: 60
End: 2020-08-07
Payer: COMMERCIAL

## 2020-08-07 VITALS
HEIGHT: 63 IN | SYSTOLIC BLOOD PRESSURE: 120 MMHG | HEART RATE: 105 BPM | WEIGHT: 157.5 LBS | BODY MASS INDEX: 27.91 KG/M2 | DIASTOLIC BLOOD PRESSURE: 70 MMHG | TEMPERATURE: 98.8 F | OXYGEN SATURATION: 98 %

## 2020-08-07 PROCEDURE — 99213 OFFICE O/P EST LOW 20 MIN: CPT | Mod: 25

## 2020-08-07 PROCEDURE — 71046 X-RAY EXAM CHEST 2 VIEWS: CPT

## 2020-08-07 RX ORDER — PREDNISONE 10 MG/1
10 TABLET ORAL
Qty: 30 | Refills: 1 | Status: DISCONTINUED | COMMUNITY
End: 2020-08-07

## 2020-08-07 RX ORDER — PREDNISONE 5 MG/1
5 TABLET ORAL
Qty: 30 | Refills: 0 | Status: DISCONTINUED | COMMUNITY
Start: 2020-02-04 | End: 2020-08-07

## 2020-08-07 RX ORDER — PANTOPRAZOLE 40 MG/1
40 TABLET, DELAYED RELEASE ORAL TWICE DAILY
Qty: 60 | Refills: 3 | Status: DISCONTINUED | COMMUNITY
Start: 2019-04-04 | End: 2020-08-07

## 2020-08-07 NOTE — ASSESSMENT
[FreeTextEntry1] : Data reviewed:\par \par ECG and echo Dr Contreras 4/2019 - ok\par \par CT chest [facility] 3/27/19: Interval development of significant mediastinal and bilateral hilar lymphadenopathy as above which is of uncertain etiology. Neoplastic etiologies including lymphoma should be considered. 5 mm nodule in the right lower lobe, previously measuring 4 mm. Enlarged paraesophageal lymph node also noted. Few bilateral subcentimeter hypodense thyroid nodules. Consider follow-up and correlation with ultrasound. \par \par CT abd [facility] 9/3/19: no abnormality in lung parenchyma; sl dec adenopathy\par PA/lat CXR [facility] 9/3/19: clear\par PA/lat CXR in office 8/7/20: clear lungs\par \par Surg path 4/17/19: nonnecrotizing granulomas / fungal & AFB stains neg / flow neg\par \par PFT 5/7/19: nonspecific ventilatory abnormality, no BD response, TLC 89%, DLCO 70%\par PFT 8/28/19: no obstruction, no BD response, mild restriction, TLC 76%, DLCO 71%\par Isabela 2/4/20: normal\par \par Impression:\par Sarcoidosis, symptomatic, tapered off prednisone in March 2020\par \par Plan:\par She's doing great off prednisone.\par Can follow annually, or for new symptoms.\par Flu shot in fall.\par Will discuss neck w Dr Olivera.

## 2020-08-07 NOTE — PHYSICAL EXAM
[Normal S1, S2] : normal s1, s2 [No Acute Distress] : no acute distress [Normal Rate/Rhythm] : normal rate/rhythm [No Resp Distress] : no resp distress [No Murmurs] : no murmurs [Clear to Auscultation Bilaterally] : clear to auscultation bilaterally [TextBox_44] : R carotid is visibly pulsatile, different than L

## 2020-08-07 NOTE — HISTORY OF PRESENT ILLNESS
[TextBox_4] : 05/06/2019: Asked to evaluate patient by Dr Myers for sarcoidosis. Here w daughter. Started losing weight about 3 mos ago, 30 lbs. CXR showed adenopathy, and mediastinoscopy showed noncaseating granulomas. Additional symptoms include muscle cramps, dry mouth, change in taste, fatigue. + chest discomfort in chest, no palps. Coughing since biopsy, dry. No vision changes. No arthritis/arthralgia. No EN. No fever but did have chills at the beginning. She is diabetic and Invokana was just added; remains on metformin.  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 Required Augmentin for sinusitis in the summer 2019. Inc wheezing on prendisone 15mg. Sept 2019 seen in ED for L chest and abd pain and given pantoprazole. Last seen in Feb 2020 on 10mg prednisone doing great, using Symbicort prn, resolved abdominal pain on pantoprazole. Plan was to taper to off.\par \par 8/7/20: She did taper off prednisone as we had planned and had no recurrence of symptoms at all. No cough, no fatigue, no complaints, feels great without any symptoms. Has still rarely used Symbicort for cough but not using it now. Did not have Covid, has been well. Daughter is being evaluated for an autoimmune disease.

## 2020-09-25 ENCOUNTER — APPOINTMENT (OUTPATIENT)
Dept: INTERNAL MEDICINE | Facility: CLINIC | Age: 60
End: 2020-09-25
Payer: COMMERCIAL

## 2020-09-25 VITALS
DIASTOLIC BLOOD PRESSURE: 82 MMHG | SYSTOLIC BLOOD PRESSURE: 124 MMHG | WEIGHT: 156 LBS | RESPIRATION RATE: 16 BRPM | HEART RATE: 98 BPM | HEIGHT: 63 IN | TEMPERATURE: 96 F | BODY MASS INDEX: 27.64 KG/M2

## 2020-09-25 DIAGNOSIS — R21 RASH AND OTHER NONSPECIFIC SKIN ERUPTION: ICD-10-CM

## 2020-09-25 PROCEDURE — 90471 IMMUNIZATION ADMIN: CPT

## 2020-09-25 PROCEDURE — 36415 COLL VENOUS BLD VENIPUNCTURE: CPT

## 2020-09-25 PROCEDURE — 99215 OFFICE O/P EST HI 40 MIN: CPT | Mod: 25

## 2020-09-25 PROCEDURE — 90686 IIV4 VACC NO PRSV 0.5 ML IM: CPT

## 2020-09-25 RX ORDER — METFORMIN HYDROCHLORIDE 500 MG/1
500 TABLET, COATED ORAL
Qty: 60 | Refills: 0 | Status: DISCONTINUED | COMMUNITY
Start: 2018-12-04 | End: 2020-09-25

## 2020-09-25 RX ORDER — ENTECAVIR 0.5 MG/1
0.5 TABLET, FILM COATED ORAL DAILY
Qty: 30 | Refills: 2 | Status: DISCONTINUED | COMMUNITY
Start: 2019-06-20 | End: 2020-09-25

## 2020-09-25 RX ORDER — BENZONATATE 200 MG/1
200 CAPSULE ORAL 3 TIMES DAILY
Qty: 30 | Refills: 0 | Status: DISCONTINUED | COMMUNITY
Start: 2019-09-03 | End: 2020-09-25

## 2020-09-25 RX ORDER — FLUTICASONE PROPIONATE 50 UG/1
50 SPRAY, METERED NASAL DAILY
Qty: 1 | Refills: 3 | Status: DISCONTINUED | COMMUNITY
Start: 2019-04-04 | End: 2020-09-25

## 2020-09-25 NOTE — PLAN
[FreeTextEntry1] : DM - chcek labs\par  continue metformin\par  hold invokana until labs are back\par - ophtho -referral\par \par Sarcoid- stable off meds\par \par Knee pain - ortho referral\par \par Rash - start lotrisone - \par \par RIght areterial ?bulging - needs ECHO and carotids - f/up Dr Contreras\par \par mammo.

## 2020-09-25 NOTE — PHYSICAL EXAM
[No Edema] : there was no peripheral edema [Normal] : affect was normal and insight and judgment were intact [de-identified] : visible pulsation at base of SCM [de-identified] : hypopigmentation scattered areas b/l ue

## 2020-09-26 LAB
ALBUMIN SERPL ELPH-MCNC: 4.4 G/DL
ALP BLD-CCNC: 92 U/L
ALT SERPL-CCNC: 23 U/L
ANION GAP SERPL CALC-SCNC: 16 MMOL/L
AST SERPL-CCNC: 17 U/L
BASOPHILS # BLD AUTO: 0.02 K/UL
BASOPHILS NFR BLD AUTO: 0.3 %
BILIRUB SERPL-MCNC: 0.4 MG/DL
BUN SERPL-MCNC: 13 MG/DL
CALCIUM SERPL-MCNC: 9.5 MG/DL
CHLORIDE SERPL-SCNC: 103 MMOL/L
CHOLEST SERPL-MCNC: 198 MG/DL
CHOLEST/HDLC SERPL: 3.2 RATIO
CO2 SERPL-SCNC: 22 MMOL/L
CREAT SERPL-MCNC: 0.57 MG/DL
EOSINOPHIL # BLD AUTO: 0.09 K/UL
EOSINOPHIL NFR BLD AUTO: 1.4 %
ESTIMATED AVERAGE GLUCOSE: 166 MG/DL
GLUCOSE SERPL-MCNC: 134 MG/DL
HBA1C MFR BLD HPLC: 7.4 %
HCT VFR BLD CALC: 41.1 %
HDLC SERPL-MCNC: 63 MG/DL
HGB BLD-MCNC: 13 G/DL
IMM GRANULOCYTES NFR BLD AUTO: 0.3 %
LDLC SERPL CALC-MCNC: 103 MG/DL
LYMPHOCYTES # BLD AUTO: 1.46 K/UL
LYMPHOCYTES NFR BLD AUTO: 23.1 %
MAN DIFF?: NORMAL
MCHC RBC-ENTMCNC: 30.4 PG
MCHC RBC-ENTMCNC: 31.6 GM/DL
MCV RBC AUTO: 96 FL
MONOCYTES # BLD AUTO: 0.61 K/UL
MONOCYTES NFR BLD AUTO: 9.7 %
NEUTROPHILS # BLD AUTO: 4.12 K/UL
NEUTROPHILS NFR BLD AUTO: 65.2 %
PLATELET # BLD AUTO: 205 K/UL
POTASSIUM SERPL-SCNC: 4.7 MMOL/L
PROT SERPL-MCNC: 6.5 G/DL
RBC # BLD: 4.28 M/UL
RBC # FLD: 12.8 %
SODIUM SERPL-SCNC: 141 MMOL/L
TRIGL SERPL-MCNC: 158 MG/DL
TSH SERPL-ACNC: 1.13 UIU/ML
WBC # FLD AUTO: 6.32 K/UL

## 2020-10-13 ENCOUNTER — APPOINTMENT (OUTPATIENT)
Dept: HEART AND VASCULAR | Facility: CLINIC | Age: 60
End: 2020-10-13
Payer: COMMERCIAL

## 2020-10-13 ENCOUNTER — APPOINTMENT (OUTPATIENT)
Dept: HEART AND VASCULAR | Facility: CLINIC | Age: 60
End: 2020-10-13

## 2020-10-13 VITALS
HEART RATE: 89 BPM | WEIGHT: 158.06 LBS | BODY MASS INDEX: 28 KG/M2 | RESPIRATION RATE: 14 BRPM | OXYGEN SATURATION: 98 % | HEIGHT: 63 IN | SYSTOLIC BLOOD PRESSURE: 132 MMHG | DIASTOLIC BLOOD PRESSURE: 92 MMHG | TEMPERATURE: 96.8 F

## 2020-10-13 VITALS — DIASTOLIC BLOOD PRESSURE: 80 MMHG | SYSTOLIC BLOOD PRESSURE: 118 MMHG

## 2020-10-13 PROCEDURE — 93306 TTE W/DOPPLER COMPLETE: CPT

## 2020-10-13 PROCEDURE — 93000 ELECTROCARDIOGRAM COMPLETE: CPT

## 2020-10-13 PROCEDURE — 93880 EXTRACRANIAL BILAT STUDY: CPT

## 2020-10-13 PROCEDURE — 99214 OFFICE O/P EST MOD 30 MIN: CPT

## 2020-10-13 NOTE — PHYSICAL EXAM
[General Appearance - Well Developed] : well developed [Normal Appearance] : normal appearance [Well Groomed] : well groomed [General Appearance - Well Nourished] : well nourished [No Deformities] : no deformities [General Appearance - In No Acute Distress] : no acute distress [Normal Conjunctiva] : the conjunctiva exhibited no abnormalities [Heart Sounds] : normal S1 and S2 [] : no respiratory distress [Respiration, Rhythm And Depth] : normal respiratory rhythm and effort [Exaggerated Use Of Accessory Muscles For Inspiration] : no accessory muscle use [Auscultation Breath Sounds / Voice Sounds] : lungs were clear to auscultation bilaterally [Abnormal Walk] : normal gait [FreeTextEntry1] : no edema [Skin Turgor] : normal skin turgor [Oriented To Time, Place, And Person] : oriented to person, place, and time [Affect] : the affect was normal [Mood] : the mood was normal [No Anxiety] : not feeling anxious

## 2020-10-13 NOTE — DISCUSSION/SUMMARY
[FreeTextEntry1] : At the time of the patient's visit a Carotid Doppler was performed to evaluate for PVD. \par \par At the time of the patient's visit an Echocardiogram was performed to evaluate LV function. At the time of the visit the results were reviewed with patient \par \par DORI, thyroid cyst on right--i asked her to return for a stress-echocardiogram

## 2020-10-13 NOTE — REVIEW OF SYSTEMS
[Eyeglasses] : currently wearing eyeglasses [Negative] : Musculoskeletal [Blurry Vision] : no blurred vision [Loss Of Hearing] : no hearing loss

## 2020-10-13 NOTE — HISTORY OF PRESENT ILLNESS
[FreeTextEntry1] : 59 year female with Sarcoidosis and DM who was noted to have pulsations at the base of her neck. She denies having chest pain. She does get SOB. She is able to walk a few city blocks and develops SOB. She describes pulling sensation of both LEs at times

## 2020-11-10 ENCOUNTER — APPOINTMENT (OUTPATIENT)
Dept: OPHTHALMOLOGY | Facility: CLINIC | Age: 60
End: 2020-11-10

## 2020-11-12 ENCOUNTER — APPOINTMENT (OUTPATIENT)
Dept: HEART AND VASCULAR | Facility: CLINIC | Age: 60
End: 2020-11-12

## 2020-12-08 ENCOUNTER — APPOINTMENT (OUTPATIENT)
Dept: HEART AND VASCULAR | Facility: CLINIC | Age: 60
End: 2020-12-08

## 2020-12-14 NOTE — PRE-OP CHECKLIST - RESPIRATORY RATE (BREATHS/MIN)
Pt fell last night around 1030. Pt was Sitting on his couch, lost consciousness. Slumped forehead and hit head on coffee table. When he drinks, water goes down wrong pipe, has vasal vagal response and passes out.   Has occurred twice in past, first about 10 years ago.     Pt has swelling on forehead, scrape on his nose. Swelling is minimal. No active bleeding. No headache. No dizziness. No vision changes. No mental confusion. States feel back to normal this A.M.     Pt states when he came to, he was laying on his left arm. His arm was numb from that. Now has full sensation in left arm, but some minor tingling continues.     Pt wanted to let PCP know and advise if he needs to be seen for this.    16

## 2021-01-19 ENCOUNTER — APPOINTMENT (OUTPATIENT)
Dept: HEART AND VASCULAR | Facility: CLINIC | Age: 61
End: 2021-01-19
Payer: COMMERCIAL

## 2021-01-19 VITALS
WEIGHT: 158.06 LBS | DIASTOLIC BLOOD PRESSURE: 84 MMHG | HEIGHT: 63 IN | TEMPERATURE: 97.7 F | BODY MASS INDEX: 28 KG/M2 | OXYGEN SATURATION: 97 % | HEART RATE: 94 BPM | RESPIRATION RATE: 14 BRPM | SYSTOLIC BLOOD PRESSURE: 104 MMHG

## 2021-01-27 ENCOUNTER — NON-APPOINTMENT (OUTPATIENT)
Age: 61
End: 2021-01-27

## 2021-01-28 ENCOUNTER — APPOINTMENT (OUTPATIENT)
Dept: HEART AND VASCULAR | Facility: CLINIC | Age: 61
End: 2021-01-28
Payer: COMMERCIAL

## 2021-01-28 VITALS
OXYGEN SATURATION: 97 % | HEIGHT: 63 IN | RESPIRATION RATE: 14 BRPM | DIASTOLIC BLOOD PRESSURE: 92 MMHG | BODY MASS INDEX: 28 KG/M2 | HEART RATE: 82 BPM | WEIGHT: 158 LBS | SYSTOLIC BLOOD PRESSURE: 142 MMHG | TEMPERATURE: 97.2 F

## 2021-01-28 DIAGNOSIS — I34.0 NONRHEUMATIC MITRAL (VALVE) INSUFFICIENCY: ICD-10-CM

## 2021-01-28 PROCEDURE — 93351 STRESS TTE COMPLETE: CPT

## 2021-01-28 NOTE — ASSESSMENT
[FreeTextEntry1] : At the time of the patient's visit a Stress Echocardiogram was performed to evaluate for exercise induced arrhythmia and ischemia. At the time of the visit the results were reviewed with patient \par \par I encouraged continued risk factor reduction and gradual increase in aerobic activity as tolerated

## 2021-02-12 ENCOUNTER — OUTPATIENT (OUTPATIENT)
Dept: OUTPATIENT SERVICES | Facility: HOSPITAL | Age: 61
LOS: 1 days | End: 2021-02-12
Payer: COMMERCIAL

## 2021-02-12 ENCOUNTER — APPOINTMENT (OUTPATIENT)
Dept: ULTRASOUND IMAGING | Facility: HOSPITAL | Age: 61
End: 2021-02-12
Payer: COMMERCIAL

## 2021-02-12 DIAGNOSIS — Z98.890 OTHER SPECIFIED POSTPROCEDURAL STATES: Chronic | ICD-10-CM

## 2021-02-12 DIAGNOSIS — Z41.9 ENCOUNTER FOR PROCEDURE FOR PURPOSES OTHER THAN REMEDYING HEALTH STATE, UNSPECIFIED: Chronic | ICD-10-CM

## 2021-02-12 PROCEDURE — 76536 US EXAM OF HEAD AND NECK: CPT

## 2021-02-12 PROCEDURE — 76536 US EXAM OF HEAD AND NECK: CPT | Mod: 26

## 2021-02-24 ENCOUNTER — APPOINTMENT (OUTPATIENT)
Dept: OTOLARYNGOLOGY | Facility: CLINIC | Age: 61
End: 2021-02-24
Payer: COMMERCIAL

## 2021-02-24 VITALS
BODY MASS INDEX: 27.82 KG/M2 | HEIGHT: 63 IN | HEART RATE: 118 BPM | TEMPERATURE: 97.2 F | DIASTOLIC BLOOD PRESSURE: 87 MMHG | WEIGHT: 157 LBS | SYSTOLIC BLOOD PRESSURE: 132 MMHG

## 2021-02-24 DIAGNOSIS — K11.8 OTHER DISEASES OF SALIVARY GLANDS: ICD-10-CM

## 2021-02-24 DIAGNOSIS — R22.1 LOCALIZED SWELLING, MASS AND LUMP, NECK: ICD-10-CM

## 2021-02-24 DIAGNOSIS — J01.80 OTHER ACUTE SINUSITIS: ICD-10-CM

## 2021-02-24 PROCEDURE — 31231 NASAL ENDOSCOPY DX: CPT

## 2021-02-24 PROCEDURE — 99072 ADDL SUPL MATRL&STAF TM PHE: CPT

## 2021-02-24 PROCEDURE — 99214 OFFICE O/P EST MOD 30 MIN: CPT | Mod: 25

## 2021-02-24 RX ORDER — BUDESONIDE AND FORMOTEROL FUMARATE DIHYDRATE 160; 4.5 UG/1; UG/1
160-4.5 AEROSOL RESPIRATORY (INHALATION) TWICE DAILY
Qty: 1 | Refills: 0 | Status: COMPLETED | OUTPATIENT
Start: 2019-08-28 | End: 2021-02-24

## 2021-02-24 RX ORDER — CANAGLIFLOZIN 100 MG/1
100 TABLET, FILM COATED ORAL
Qty: 30 | Refills: 11 | Status: COMPLETED | COMMUNITY
Start: 2019-04-26 | End: 2021-02-24

## 2021-02-28 PROBLEM — J01.80 OTHER ACUTE SINUSITIS, RECURRENCE NOT SPECIFIED: Status: RESOLVED | Noted: 2019-06-19 | Resolved: 2021-02-28

## 2021-02-28 PROBLEM — R22.1 PULSATILE NECK MASS: Status: RESOLVED | Noted: 2020-10-13 | Resolved: 2021-02-28

## 2021-02-28 PROBLEM — K11.8 PAROTID MASS: Status: RESOLVED | Noted: 2019-04-04 | Resolved: 2021-02-28

## 2021-02-28 NOTE — PHYSICAL EXAM
[Nasal Endoscopy Performed] : nasal endoscopy was performed, see procedure section for findings [Midline] : trachea located in midline position [Normal] : no neck adenopathy [FreeTextEntry1] : No hoarseness.

## 2021-02-28 NOTE — HISTORY OF PRESENT ILLNESS
[de-identified] : Ms. Branch was seen in f/up for thyroid nodules. \par She does not feel any lump in neck or throat pain\par When prelim US for FNA was done in September 2019, no thyroid nodule seen so no biopsy was done.\par USG FNA of left thyroid nodule twice was nondiagnostic (6/13/2019 and 7/03/2019) \par Left parotid mass resolving on US, so not biopsied\par \par She feels like her throat and nose have pressure when she lies down\par Still with chronic right postnasal drip and mucus in throat, which she has had x years.  Not using fluticasone spray again\par Hx of pulmonary sarcoidosis. Breathing has been okay, no longer on prednisone\par \par \par US THYROID (02/12/2021) at [facility]:\par - Prior study: Ultrasound thyroid dated 3/29/2019\par - RIGHT LOBE:  5 x 0.9 x 1.8 cm, homogenous, normal vascularity and contains 3 spongiform nodules.\par     --- 0.4 x 0.4 x 0.3 cm spongiform nodule in medial midpole, grossly stable\par     --- 0.5 x 0.3 x 0.4 cm spongiform nodule in lateral midpole, grossly stable\par     --- 0.5 x 0.3 x 0.5  cm spongiform nodule in lateral midpole, previously 0.2 x 0.2 x 0.2 cm\par - LEFT LOBE:  4.4 x 0.7 x 1.6 cm, homogenous, normal vascularity and contains no visible nodules.\par - ISTHMUS:  0.2 cm  and contains no visible nodules.\par - CERVICAL LYMPH NODES:  No abnormal lymph nodes are identified in the neck.\par - PARATHYROIDS:  Not visualized.\par IMPRESSION:\par 1.) Since 3/29/2019, there has been resolution of left thyroid lobe nodule.\par 2.) 3 spongiform nodules in the right thyroid lobe do not meet the  criteria for FNA biopsy.\par \par \par US NECK (9/17/2019) at [facility]:\par - Limited images of left thyroid lobe demonstrate no evidence of a nodule.  The lobe is homogeneous.\par - Biopsy cancelled\par \par USG FNA Left thyroid nodule, upper pole, 1.6 cm (7/03/2019) at [facility]:\par - Nondiagnostic\par \par USG FNA Left thyroid nodule, upper pole, 1.6 cm (6/13/2019) at [facility]:\par - Nondiagnostic (ThinPrep slid and cell block are acellular)\par \par US NECK (06/13/2019) at [facility]:\par - Comparison: MR brain 4/19/2019 \par - Targeted ultrasound images of the left parotid gland were performed. \par The parotid gland appears homogeneous and with normal vascularity. \par A 0.3 x 0.2 x 0.3 cm complex cystic lesion with an internal septation in the mid left parotid gland. \par IMPRESSION: \par There is a 0.3 cm complex cystic lesion with a septation in the left parotid gland. It has a nonspecific appearance.\par \par US THYROID (3/29/2019) at [facility]:\par - RIGHT thyroid lobe 4.5 x 1.0 x 1.9 cm, homogeneous, with 3 cysts\par  -- 0.2 x 0.2 x 0.2 cm cyst in midpole\par  -- 0.4 x 0.3 x 0.4 cm complex cyst with internal septations and debris in mid pole\par  -- 0.4 x 0.2 x 0.3 cm cyst with internal septation in the lower pole adjacent to isthmus\par - LEFT thyroid lobe 4.9 x 1.1 x 1.7 cm, homogeneous\par  -- 0.5 x 0.3 x 0.4 cm cyst in upper pole\par  -- 1.9 x 0.7 x 0.8 cm complex cystic and solid, including eccentric anterior solid component measuring 1.7 cm, hypoechoic; no microcalcifications\par - ISTHMUS 0.2 cm with no nodules\par - CERVICAL LYMPH NODES: No abnormal lymph nodes identified in neck\par - PARATHYROID GLANDS: Not visualized\par \par CT CHEST with iv contrast (3/27/2019) at [facility]:\par -- Comparison to CT 11/28/2017\par -- Interval development of significant mediastinal and bilateral hilar lymphadenopathy. \par  -- Right paratracheal lymph node, 1.9 cm. \par  -- Enlarged subcarinal lymph node, 1.8 cm\par  -- Enlarged bilateral hilar lymph nodes, up to 1.8 cm on right\par  -- No significant axillary adenopathy.\par - Biapical scarring, 5 mm nodule in RLL (previously 4 mm)\par - Enlarged paraesophageal lymph node , 1.1 cm\par - Few bilateral subcm hypodense thyroid nodules, up to 0.9 cm\par - Otherwise unremarkable\par \par

## 2021-02-28 NOTE — CONSULT LETTER
[Dear  ___] : Dear  [unfilled], [Courtesy Letter:] : I had the pleasure of seeing your patient, [unfilled], in my office today. [Please see my note below.] : Please see my note below. [Consult Closing:] : Thank you very much for allowing me to participate in the care of this patient.  If you have any questions, please do not hesitate to contact me. [Sincerely,] : Sincerely, [FreeTextEntry2] : Deepak Olivera MD [FreeTextEntry1] : \par  [FreeTextEntry3] : \par Shari Parham MD \par Otolaryngology, Head and Neck Surgery \par \par

## 2021-02-28 NOTE — PROCEDURE
[FreeTextEntry6] : \par Indication:  chronic sinusitis\par -Verbal consent was obtained from patient prior to exam. \par - Ar-Synephrine and lidocaine 2% spray applied to nose bilaterally.\par Nasal endoscopy was performed with flexible scope.\par Findings: \par -- Inferior turbinates normal    edematous    boggy  bilateral.  Inferior meatus clear bilateral.\par -- Septum was  S-shaped.\par -- No polyps either side nose\par -- Mucus cloudy draining from right middle meatus. Left middle meatus clear.\par -- Middle and superior turbinates normal bilateral\par -- SER clear bilateral.\par -- Nasopharynx without mass or exudate.  Adenoids absent\par -- Eustachian orifices were clear bilateral\par \par The patient tolerated the procedure well.\par   [de-identified] : \par Indication:  throat clearing\par -Verbal consent was obtained from patient prior to procedure.\par -Ar-Synephrine and lidocaine 2% spray applied to the nasal cavities.\par Flexible laryngoscopy was performed via left nostril and revealed the following:\par   -- Nasopharynx had no mass or exudate.\par   -- Base of tongue was symmetric and not enlarged.\par   -- Vallecula was clear.  Cobblestoning posterior pharyngeal wall. \par   -- Epiglottis, both aryepiglottic folds and both false vocal folds were normal\par   -- Arytenoids both with mild edema and no erythema \par   -- True vocal folds were fully mobile and without lesions. \par   -- Post cricoid area was clear.\par   -- Interarytenoid edema was   present.\par   -- No lesions in laryngopharynx\par \par The patient tolerated the procedure well.\par

## 2021-02-28 NOTE — ASSESSMENT
[FreeTextEntry1] : Ms. Branch  was evaluated for the following issues today:\par \par 1.) Multiple thyroid nodules, all sub-cm size and without suspicious features\par --> Repeat US thyroid in 1 year\par \par 2.) Parotid lesion has not recurred.\par \par 3.) Chronic sinusitis with exacerbation symptomatic for over a month.  Cloudy mucus seen streaming from right middle meatus.\par --> start Augmentin\par --> Saline mist spray\par \par Return in 1 month

## 2021-03-24 ENCOUNTER — APPOINTMENT (OUTPATIENT)
Dept: OTOLARYNGOLOGY | Facility: CLINIC | Age: 61
End: 2021-03-24
Payer: COMMERCIAL

## 2021-03-24 VITALS
TEMPERATURE: 96.5 F | WEIGHT: 163.4 LBS | HEIGHT: 63 IN | SYSTOLIC BLOOD PRESSURE: 152 MMHG | BODY MASS INDEX: 28.95 KG/M2 | DIASTOLIC BLOOD PRESSURE: 98 MMHG | DIASTOLIC BLOOD PRESSURE: 98 MMHG | HEART RATE: 99 BPM | HEART RATE: 99 BPM | HEIGHT: 63 IN | TEMPERATURE: 96.5 F | BODY MASS INDEX: 28.95 KG/M2 | WEIGHT: 163.4 LBS | SYSTOLIC BLOOD PRESSURE: 152 MMHG

## 2021-03-24 DIAGNOSIS — G44.209 TENSION-TYPE HEADACHE, UNSPECIFIED, NOT INTRACTABLE: ICD-10-CM

## 2021-03-24 PROCEDURE — 99072 ADDL SUPL MATRL&STAF TM PHE: CPT

## 2021-03-24 PROCEDURE — 99214 OFFICE O/P EST MOD 30 MIN: CPT

## 2021-03-24 RX ORDER — AMOXICILLIN AND CLAVULANATE POTASSIUM 875; 125 MG/1; MG/1
875-125 TABLET, COATED ORAL
Qty: 20 | Refills: 0 | Status: COMPLETED | COMMUNITY
Start: 2021-02-24 | End: 2021-03-24

## 2021-03-28 ENCOUNTER — APPOINTMENT (OUTPATIENT)
Dept: CT IMAGING | Facility: HOSPITAL | Age: 61
End: 2021-03-28

## 2021-03-28 ENCOUNTER — OUTPATIENT (OUTPATIENT)
Dept: OUTPATIENT SERVICES | Facility: HOSPITAL | Age: 61
LOS: 1 days | End: 2021-03-28
Payer: COMMERCIAL

## 2021-03-28 DIAGNOSIS — Z41.9 ENCOUNTER FOR PROCEDURE FOR PURPOSES OTHER THAN REMEDYING HEALTH STATE, UNSPECIFIED: Chronic | ICD-10-CM

## 2021-03-28 DIAGNOSIS — Z98.890 OTHER SPECIFIED POSTPROCEDURAL STATES: Chronic | ICD-10-CM

## 2021-03-28 PROCEDURE — 70486 CT MAXILLOFACIAL W/O DYE: CPT

## 2021-03-28 PROCEDURE — 70486 CT MAXILLOFACIAL W/O DYE: CPT | Mod: 26

## 2021-03-29 ENCOUNTER — APPOINTMENT (OUTPATIENT)
Dept: PULMONOLOGY | Facility: CLINIC | Age: 61
End: 2021-03-29
Payer: COMMERCIAL

## 2021-03-29 VITALS
BODY MASS INDEX: 28.88 KG/M2 | OXYGEN SATURATION: 98 % | HEART RATE: 103 BPM | DIASTOLIC BLOOD PRESSURE: 87 MMHG | HEIGHT: 63 IN | WEIGHT: 163 LBS | SYSTOLIC BLOOD PRESSURE: 120 MMHG | TEMPERATURE: 97.3 F

## 2021-03-29 PROCEDURE — 36415 COLL VENOUS BLD VENIPUNCTURE: CPT

## 2021-03-29 PROCEDURE — 99072 ADDL SUPL MATRL&STAF TM PHE: CPT

## 2021-03-29 PROCEDURE — 99214 OFFICE O/P EST MOD 30 MIN: CPT | Mod: 25

## 2021-03-29 NOTE — ASSESSMENT
[FreeTextEntry1] : Data reviewed:\par \par ECG and echo Dr Contreras 4/2019 - ok\par \par CT chest [facility] 3/27/19: Interval development of significant mediastinal and bilateral hilar lymphadenopathy as above which is of uncertain etiology. 5 mm nodule in the right lower lobe, previously measuring 4 mm. Enlarged paraesophageal lymph node also noted. Few bilateral subcentimeter hypodense thyroid nodules. \par \par PA/lat CXR in office 3/29/21: clear lungs, no change from prior\par \par Surg path 4/17/19: nonnecrotizing granulomas / fungal & AFB stains neg / flow neg\par \par PFT 5/7/19: nonspecific ventilatory abnormality, no BD response, TLC 89%, DLCO 70%\par PFT 8/28/19: no obstruction, no BD response, mild restriction, TLC 76%, DLCO 71%\par Nikolai 2/4/20: normal\par \par Impression:\par Sarcoidosis, was symptomatic, tapered off prednisone in March 2020\par Now with similar chest pain\par \par Plan:\par Will check labs and try a low dose of prednisone, 20mg daily.\par Follow up by phone in 2 weeks.

## 2021-03-29 NOTE — HISTORY OF PRESENT ILLNESS
[TextBox_4] : 05/06/2019: Asked to evaluate patient by Dr Myers for sarcoidosis. Here w daughter. Started losing weight about 3 mos ago, 30 lbs. CXR showed adenopathy, and mediastinoscopy showed noncaseating granulomas. Additional symptoms include muscle cramps, dry mouth, change in taste, fatigue. + chest discomfort in chest, no palps. Coughing since biopsy, dry. No vision changes. No arthritis/arthralgia. No EN. No fever but did have chills at the beginning. She is diabetic and Invokana was just added; remains on metformin.  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 Required Augmentin for sinusitis in the summer 2019. Inc wheezing on prendisone 15mg. Sept 2019 seen in ED for L chest and abd pain and given pantoprazole. Last seen in Feb 2020 on 10mg prednisone doing great, using Symbicort prn, resolved abdominal pain on pantoprazole. Plan was to taper to off.\par \par 8/7/20: She did taper off prednisone as we had planned and had no recurrence of symptoms at all. No cough, no fatigue, no complaints, feels great without any symptoms. Has still rarely used Symbicort for cough but not using it now. Did not have Covid, has been well. Daughter is being evaluated for an autoimmune disease.\par \par 3/29/21: For about 3 mos she's had chest pain that she describes as being like "labor pain," awakening her from sleep but also happening during the day, not necessarily every day but as much as twice a day, lasting a minute or so. This is the same kind of pain she had leading to diagnosis of sarcoid. Also having nocturnal "pulling" leg pain waking her from sleep, hands and fingers are also cramping. Also low back pain. No weight loss, no fever. Had Covid vaccine x 2, Pfizer. Has been off prednisone for about a year. No dyspnea. Diabetes up and down. Saw Dr Contreras, treadmill echo was fine.

## 2021-03-30 LAB
ALBUMIN SERPL ELPH-MCNC: 4.5 G/DL
ALP BLD-CCNC: 113 U/L
ALT SERPL-CCNC: 22 U/L
ANION GAP SERPL CALC-SCNC: 17 MMOL/L
AST SERPL-CCNC: 15 U/L
BASOPHILS # BLD AUTO: 0.03 K/UL
BASOPHILS NFR BLD AUTO: 0.6 %
BILIRUB SERPL-MCNC: 0.4 MG/DL
BUN SERPL-MCNC: 21 MG/DL
CALCIUM SERPL-MCNC: 10.1 MG/DL
CHLORIDE SERPL-SCNC: 98 MMOL/L
CO2 SERPL-SCNC: 22 MMOL/L
CREAT SERPL-MCNC: 0.6 MG/DL
CRP SERPL-MCNC: 7 MG/L
EOSINOPHIL # BLD AUTO: 0.16 K/UL
EOSINOPHIL NFR BLD AUTO: 3.1 %
ERYTHROCYTE [SEDIMENTATION RATE] IN BLOOD BY WESTERGREN METHOD: 18 MM/HR
GLUCOSE SERPL-MCNC: 181 MG/DL
HCT VFR BLD CALC: 42.1 %
HGB BLD-MCNC: 13.6 G/DL
IMM GRANULOCYTES NFR BLD AUTO: 0.2 %
LYMPHOCYTES # BLD AUTO: 1.23 K/UL
LYMPHOCYTES NFR BLD AUTO: 24.2 %
MAN DIFF?: NORMAL
MCHC RBC-ENTMCNC: 31 PG
MCHC RBC-ENTMCNC: 32.3 GM/DL
MCV RBC AUTO: 95.9 FL
MONOCYTES # BLD AUTO: 0.61 K/UL
MONOCYTES NFR BLD AUTO: 12 %
NEUTROPHILS # BLD AUTO: 3.05 K/UL
NEUTROPHILS NFR BLD AUTO: 59.9 %
PLATELET # BLD AUTO: 249 K/UL
POTASSIUM SERPL-SCNC: 4.6 MMOL/L
PROT SERPL-MCNC: 7.2 G/DL
RBC # BLD: 4.39 M/UL
RBC # FLD: 12.8 %
SODIUM SERPL-SCNC: 137 MMOL/L
WBC # FLD AUTO: 5.09 K/UL

## 2021-03-30 NOTE — PHYSICAL EXAM
[Midline] : trachea located in midline position [Normal] : no masses and lesions seen, face is symmetric [FreeTextEntry1] : No hoarseness.  [de-identified] : No palpable thyroid nodule. [de-identified] : Right TMJ subluxation , nontender  [de-identified] : 1+ bilateral  [de-identified] : Slightly tender in right upper gingivobuccal sulcus.

## 2021-03-30 NOTE — HISTORY OF PRESENT ILLNESS
[de-identified] : Ms. Branch c/o right nasal congestion when trying to sleep\par Chronic pressure maxillary area and headache temporoparietal on right\par Saw dentist and has a left upper gum "boil" that is close to the sinus.\par She feels like her throat and nose have pressure when she lies down\par Still with chronic right postnasal drip and mucus in throat, which she has had x years.  Not using fluticasone spray again\par Hx of pulmonary sarcoidosis. Breathing has been okay, no longer on prednisone\par \par Hx of thyroid nodules. \par She does not feel any lump in neck or throat pain\par When prelim US for FNA was done in September 2019, no thyroid nodule seen so no biopsy was done.\par USG FNA of left thyroid nodule twice was nondiagnostic (6/13/2019 and 7/03/2019) \par Left parotid mass resolving on US, so not biopsied\par \par \par US THYROID (02/12/2021) at [facility]:\par - Prior study: Ultrasound thyroid dated 3/29/2019\par - RIGHT LOBE:  5 x 0.9 x 1.8 cm, homogenous, normal vascularity and contains 3 spongiform nodules.\par     --- 0.4 x 0.4 x 0.3 cm spongiform nodule in medial midpole, grossly stable\par     --- 0.5 x 0.3 x 0.4 cm spongiform nodule in lateral midpole, grossly stable\par     --- 0.5 x 0.3 x 0.5  cm spongiform nodule in lateral midpole, previously 0.2 x 0.2 x 0.2 cm\par - LEFT LOBE:  4.4 x 0.7 x 1.6 cm, homogenous, normal vascularity and contains no visible nodules.\par - ISTHMUS:  0.2 cm  and contains no visible nodules.\par - CERVICAL LYMPH NODES:  No abnormal lymph nodes are identified in the neck.\par - PARATHYROIDS:  Not visualized.\par IMPRESSION:\par 1.) Since 3/29/2019, there has been resolution of left thyroid lobe nodule.\par 2.) 3 spongiform nodules in the right thyroid lobe do not meet the  criteria for FNA biopsy.\par \par \par US NECK (9/17/2019) at [facility]:\par - Limited images of left thyroid lobe demonstrate no evidence of a nodule.  The lobe is homogeneous.\par - Biopsy cancelled\par \par USG FNA Left thyroid nodule, upper pole, 1.6 cm (7/03/2019) at [facility]:\par - Nondiagnostic\par \par USG FNA Left thyroid nodule, upper pole, 1.6 cm (6/13/2019) at [facility]:\par - Nondiagnostic (ThinPrep slid and cell block are acellular)\par \par US NECK (06/13/2019) at [facility]:\par - Comparison: MR brain 4/19/2019 \par - Targeted ultrasound images of the left parotid gland were performed. \par The parotid gland appears homogeneous and with normal vascularity. \par A 0.3 x 0.2 x 0.3 cm complex cystic lesion with an internal septation in the mid left parotid gland. \par IMPRESSION: \par There is a 0.3 cm complex cystic lesion with a septation in the left parotid gland. It has a nonspecific appearance.\par \par US THYROID (3/29/2019) at [facility]:\par - RIGHT thyroid lobe 4.5 x 1.0 x 1.9 cm, homogeneous, with 3 cysts\par  -- 0.2 x 0.2 x 0.2 cm cyst in midpole\par  -- 0.4 x 0.3 x 0.4 cm complex cyst with internal septations and debris in mid pole\par  -- 0.4 x 0.2 x 0.3 cm cyst with internal septation in the lower pole adjacent to isthmus\par - LEFT thyroid lobe 4.9 x 1.1 x 1.7 cm, homogeneous\par  -- 0.5 x 0.3 x 0.4 cm cyst in upper pole\par  -- 1.9 x 0.7 x 0.8 cm complex cystic and solid, including eccentric anterior solid component measuring 1.7 cm, hypoechoic; no microcalcifications\par - ISTHMUS 0.2 cm with no nodules\par - CERVICAL LYMPH NODES: No abnormal lymph nodes identified in neck\par - PARATHYROID GLANDS: Not visualized\par \par CT CHEST with iv contrast (3/27/2019) at [facility]:\par -- Comparison to CT 11/28/2017\par -- Interval development of significant mediastinal and bilateral hilar lymphadenopathy. \par  -- Right paratracheal lymph node, 1.9 cm. \par  -- Enlarged subcarinal lymph node, 1.8 cm\par  -- Enlarged bilateral hilar lymph nodes, up to 1.8 cm on right\par  -- No significant axillary adenopathy.\par - Biapical scarring, 5 mm nodule in RLL (previously 4 mm)\par - Enlarged paraesophageal lymph node , 1.1 cm\par - Few bilateral subcm hypodense thyroid nodules, up to 0.9 cm\par - Otherwise unremarkable\par \par

## 2021-03-31 LAB — ACE BLD-CCNC: 83 U/L

## 2021-04-07 ENCOUNTER — APPOINTMENT (OUTPATIENT)
Dept: OTOLARYNGOLOGY | Facility: CLINIC | Age: 61
End: 2021-04-07
Payer: COMMERCIAL

## 2021-04-07 DIAGNOSIS — K09.0 DEVELOPMENTAL ODONTOGENIC CYSTS: ICD-10-CM

## 2021-04-07 PROCEDURE — 99442: CPT

## 2021-04-07 NOTE — ASSESSMENT
[FreeTextEntry1] : Ms. Branch  was evaluated for the following issues today:\par \par 1.) Chronic sinusitis with right maxillary exacerbation, not improved on 1 week prednisone (given for sarcoidosis) and saline mist spray.  Her new dizziness may related to the sinuses also.\par --> amox/clav x 10 days\par --> fluticasone nasal spray \par \par 2.) No left odontogenic cyst or infection seen on CT scan but a very small cyst may be obscured by dental artifact.\par \par 3.) Multiple thyroid nodules, all sub-cm size and without suspicious features\par --> Repeat US thyroid in 2022\par \par Return in 1 month\par \par She has appt with Dr. Barnes on 4/09.\par

## 2021-04-07 NOTE — HISTORY OF PRESENT ILLNESS
[de-identified] : TELEPHONIC VISIT\par  Visit initiated at patient request. This audio visit is occurring during the  state of emergency due to COVID-19. Governmental regulation is restricting travel, in-person contact, recommend use of remote activities and telemedicine whenever possible. I discussed with patient the limitations of telemedicine encounters, including risks associated with the technology platform, technical difficulties, data security, and no physical exam. The patient may need further testing and workup to arrive at a diagnosis and treatment plan. We discussed this will be billed as a visit. \par Ms. POWELL  understood and elected to proceed at 545  PM on April 07, 2021 \par Patient location: Home  in [place], [place].  Patient was the only participant.\par Physician location:  Office of Dr. Parham at [address] [place] [address] in [place], [place]\par ----------------------------------------------------------------------------------------\par ----------------------------------------------------------------------------------------\par Ms. Powell still has dependent right nasal congestion and chronic pressure in right maxillary area.  No change in these sx or her chest pain after started prednisone 20 mg last week for sarcoidosis flare (had same type of chest pain as preceded her original diagnosis in 2019).\par CT sinus was done to evaluate the left upper gum "boil" seen by the dentist.\par Still with chronic right postnasal drip and mucus in throat, which she has had x years. \par Over past few days, feels a little dizzy when she gets of bed and has pressure right side of head.\par Hx of pulmonary sarcoidosis, confirmed after mediastinoscopy and LN bx showed noncaseating granulomas in 2019. Was on prednisone until tapered off by Aug 2020.\par \par Hx of thyroid nodules, last US in Feb 2021.\par She does not feel any lump in neck or throat pain\par On prelim US for FNA left thyroid nodule 1.9 cm in September 2019, no thyroid nodule seen, so no biopsy was done.\par USG FNA of left thyroid nodule twice was nondiagnostic (6/13/2019 and 7/03/2019) \par Left parotid mass was resolving on US, so not biopsied.  This left parotid mass may have been sarcoid-related LN that diminished.\par \par \par CT MAXILLOFACIAL noncontrast (03/28/2021) at [facility]:\par - Prior Studies: No prior maxillofacial imaging.\par *  Prior Surgery:  There appears to have been prior right nasoantral window as well as uncinectomy, limited anterior ethmoidectomy and extensive middle turbinectomy. On the left, there has been both middle and inferior turbinoplasty.\par *  Sinuses: Trace mucosal thickening is evident within the right maxillary alveolar recess and within bilateral anterior ethmoidal air cells. The remaining paranasal sinuses are clear.\par *  Sinocranial and Sino-orbital Junctions: The anterior skull base and orbital walls are intact. Right sphenoethmoidal cell pneumatization is noted with the dominant right sphenoid sinus noted to extend into the right pterygoid process, greater sphenoid wing and dorsum sella.\par *  Ostiomeatal Units: The right infundibulotomy bed is occluded by mucosal thickening, with a small component of the more posterior maxillary antrostomy as well as the nasoantral windows noted to be patent. The components of the left ostiomeatal unit are widely patent.\par *  Frontal Sinus Outflow Tracts: Patent.\par *  Sphenoethmoidal Recesses: Clear.\par *  Maxillary Teeth: While there is no evidence of odontogenic cyst within either the maxillary or mandibular alveolar ridges, dental artifact precludes assessment of the adjacent soft tissues.\par *  Nasal Cavity/Nasopharynx: There is prominent leftward septal deviation, with partial bilateral turbinate resections, as above. No discrete nasal or nasopharyngeal mass is identified\par *  Orbits: Normal.\par *  Brain:  No hydrocephalus or large intracranial mass lesion.\par *  Mastoids: Clear.\par IMPRESSION:\par In this patient with evidence of previous bilateral sinus surgery, only trace mucosal thickening is noted in the right maxillary alveolar recess and selected bilateral anterior ethmoidal air cells. \par While no large odontogenic cyst is identified within the left maxillary alveolar ridge, assessment of the adjacent soft tissues is severely limited by dental artifact.\par (Images were reviewed.) \par \par \par US THYROID (02/12/2021) at [facility]:\par - Prior study: Ultrasound thyroid dated 3/29/2019\par - RIGHT LOBE: 5 x 0.9 x 1.8 cm, homogenous, normal vascularity and contains 3 spongiform nodules.\par  --- 0.4 x 0.4 x 0.3 cm spongiform nodule in medial midpole, grossly stable\par  --- 0.5 x 0.3 x 0.4 cm spongiform nodule in lateral midpole, grossly stable\par  --- 0.5 x 0.3 x 0.5 cm spongiform nodule in lateral midpole, previously 0.2 x 0.2 x 0.2 cm\par - LEFT LOBE: 4.4 x 0.7 x 1.6 cm, homogenous, normal vascularity and contains no visible nodules.\par - ISTHMUS: 0.2 cm and contains no visible nodules.\par - CERVICAL LYMPH NODES: No abnormal lymph nodes are identified in the neck.\par - PARATHYROIDS: Not visualized.\par IMPRESSION:\par 1.) Since 3/29/2019, there has been resolution of left thyroid lobe nodule.\par 2.) 3 spongiform nodules in the right thyroid lobe do not meet the criteria for FNA biopsy.\par \par \par US NECK (9/17/2019) at [facility]:\par - Limited images of left thyroid lobe demonstrate no evidence of a nodule. The lobe is homogeneous.\par - Biopsy cancelled\par \par USG FNA Left thyroid nodule, upper pole, 1.6 cm (7/03/2019) at [facility]:\par - Nondiagnostic\par \par USG FNA Left thyroid nodule, upper pole, 1.6 cm (6/13/2019) at [facility]:\par - Nondiagnostic (ThinPrep slid and cell block are acellular)\par \par US NECK (06/13/2019) at [facility]:\par - Comparison: MR brain 4/19/2019 \par - Targeted ultrasound images of the left parotid gland were performed. \par The parotid gland appears homogeneous and with normal vascularity. \par A 0.3 x 0.2 x 0.3 cm complex cystic lesion with an internal septation in the mid left parotid gland. \par IMPRESSION: \par There is a 0.3 cm complex cystic lesion with a septation in the left parotid gland. It has a nonspecific appearance.\par \par US THYROID (3/29/2019) at [facility]:\par - RIGHT thyroid lobe 4.5 x 1.0 x 1.9 cm, homogeneous, with 3 cysts\par  -- 0.2 x 0.2 x 0.2 cm cyst in midpole\par  -- 0.4 x 0.3 x 0.4 cm complex cyst with internal septations and debris in mid pole\par  -- 0.4 x 0.2 x 0.3 cm cyst with internal septation in the lower pole adjacent to isthmus\par - LEFT thyroid lobe 4.9 x 1.1 x 1.7 cm, homogeneous\par  -- 0.5 x 0.3 x 0.4 cm cyst in upper pole\par  -- 1.9 x 0.7 x 0.8 cm complex cystic and solid, including eccentric anterior solid component measuring 1.7 cm, hypoechoic; no microcalcifications\par - ISTHMUS 0.2 cm with no nodules\par - CERVICAL LYMPH NODES: No abnormal lymph nodes identified in neck\par - PARATHYROID GLANDS: Not visualized\par \par CT CHEST with iv contrast (3/27/2019) at [facility]:\par -- Comparison to CT 11/28/2017\par -- Interval development of significant mediastinal and bilateral hilar lymphadenopathy. \par  -- Right paratracheal lymph node, 1.9 cm. \par  -- Enlarged subcarinal lymph node, 1.8 cm\par  -- Enlarged bilateral hilar lymph nodes, up to 1.8 cm on right\par  -- No significant axillary adenopathy.\par - Biapical scarring, 5 mm nodule in RLL (previously 4 mm)\par - Enlarged paraesophageal lymph node , 1.1 cm\par - Few bilateral subcm hypodense thyroid nodules, up to 0.9 cm\par - Otherwise unremarkable\par \par

## 2021-04-07 NOTE — CONSULT LETTER
[Dear  ___] : Dear  [unfilled], [Courtesy Letter:] : I had the pleasure of seeing your patient, [unfilled], in my office today. [Please see my note below.] : Please see my note below. [Consult Closing:] : Thank you very much for allowing me to participate in the care of this patient.  If you have any questions, please do not hesitate to contact me. [Sincerely,] : Sincerely, [DrCasandra  ___] : Dr. MELLO [FreeTextEntry2] : Deepak Olivera MD [FreeTextEntry1] : \par  [FreeTextEntry3] : \par Shari Parham MD \par Otolaryngology, Head and Neck Surgery \par \par

## 2021-04-09 ENCOUNTER — APPOINTMENT (OUTPATIENT)
Dept: PULMONOLOGY | Facility: CLINIC | Age: 61
End: 2021-04-09
Payer: COMMERCIAL

## 2021-04-09 PROCEDURE — 99213 OFFICE O/P EST LOW 20 MIN: CPT | Mod: 95

## 2021-04-09 NOTE — HISTORY OF PRESENT ILLNESS
[TextBox_4] : 05/06/2019: Asked to evaluate patient by Dr Myers for sarcoidosis. Here w daughter. Started losing weight about 3 mos ago, 30 lbs. CXR showed adenopathy, and mediastinoscopy showed noncaseating granulomas. Additional symptoms include muscle cramps, dry mouth, change in taste, fatigue. + chest discomfort in chest, no palps. Coughing since biopsy, dry. No vision changes. No arthritis/arthralgia. No EN. No fever but did have chills at the beginning. She is diabetic and Invokana was just added; remains on metformin.  today. 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 Started pred 30mg and Dr Gayle started HBV ppx.\par \par On prednisone energy and cough improved. Required Augmentin for sinusitis in the summer 2019. Inc wheezing on prendisone 15mg. Sept 2019 seen in ED for L chest and abd pain and given pantoprazole. Last seen in Feb 2020 on 10mg prednisone doing great, using Symbicort prn, resolved abdominal pain on pantoprazole. Plan was to taper to off.\par \par 8/7/20: She did taper off prednisone as we had planned and had no recurrence of symptoms at all. No cough, no fatigue, no complaints, feels great without any symptoms. Has still rarely used Symbicort for cough but not using it now. Did not have Covid, has been well. Daughter is being evaluated for an autoimmune disease.\par \par 3/29/21: For about 3 mos she's had chest pain that she describes as being like "labor pain," awakening her from sleep but also happening during the day, not necessarily every day but as much as twice a day, lasting a minute or so. This is the same kind of pain she had leading to diagnosis of sarcoid. Also having nocturnal "pulling" leg pain waking her from sleep, hands and fingers are also cramping. Also low back pain. No weight loss, no fever. Had Covid vaccine x 2, Pfizer. Has been off prednisone for about a year. No dyspnea. Diabetes up and down. Saw Dr Contreras, treadmill echo was fine. Restarted a low dose of prednisone.\par \par 4/9/21: Telehealth visit (both in [place]) to follow up on the response to prednisone. She feels somewhat better. She is still experiencing the same chest pain, but it is less frequent - went a week between episodes. Having less cramping. Overall symptoms still present but better. Had virtual visit w Dr Parham who is going to add Augmentin for sinus disease. Has not been noting increased glucose readings on the prednisone.

## 2021-04-09 NOTE — ASSESSMENT
[FreeTextEntry1] : Data reviewed:\par \par Labs 3/30/21: CBC normal, gluc 181, ESR 18, CRP 7 (elev), ACE 83 (uln 82)\par \par ECG and echo Dr Contreras 4/2019 - ok\par \par CT chest [facility] 3/27/19: Interval development of significant mediastinal and bilateral hilar lymphadenopathy as above which is of uncertain etiology. 5 mm nodule in the right lower lobe, previously measuring 4 mm. Enlarged paraesophageal lymph node also noted. Few bilateral subcentimeter hypodense thyroid nodules. \par \par PA/lat CXR in office 3/29/21: clear lungs, no change from prior\par \par Surg path 4/17/19: nonnecrotizing granulomas / fungal & AFB stains neg / flow neg\par \par PFT 5/7/19: nonspecific ventilatory abnormality, no BD response, TLC 89%, DLCO 70%\par PFT 8/28/19: no obstruction, no BD response, mild restriction, TLC 76%, DLCO 71%\par Nikolai 2/4/20: normal\par \par Impression:\par Sarcoidosis, was symptomatic, tapered off prednisone in March 2020\par Now with similar chest pain, elevated inflammatory markers\par \par Plan:\par Some improvement on prednisone 20mg daily. Will hold this dose and re-eval in 2 weeks and hope to taper then. She will fill the Augmentin from Dr Parham tomorrow.

## 2021-04-23 ENCOUNTER — APPOINTMENT (OUTPATIENT)
Dept: PULMONOLOGY | Facility: CLINIC | Age: 61
End: 2021-04-23
Payer: COMMERCIAL

## 2021-04-23 PROCEDURE — 99441: CPT

## 2021-04-23 NOTE — ASSESSMENT
[FreeTextEntry1] : Data reviewed:\par \par Labs 3/30/21: CBC normal, gluc 181, ESR 18, CRP 7 (elev), ACE 83 (uln 82)\par \par ECG and echo Dr Contreras 4/2019 - ok\par \par CT chest [facility] 3/27/19: Interval development of significant mediastinal and bilateral hilar lymphadenopathy as above which is of uncertain etiology. 5 mm nodule in the right lower lobe, previously measuring 4 mm. Enlarged paraesophageal lymph node also noted. Few bilateral subcentimeter hypodense thyroid nodules. \par \par PA/lat CXR in office 3/29/21: clear lungs, no change from prior\par \par Surg path 4/17/19: nonnecrotizing granulomas / fungal & AFB stains neg / flow neg\par \par PFT 5/7/19: nonspecific ventilatory abnormality, no BD response, TLC 89%, DLCO 70%\par PFT 8/28/19: no obstruction, no BD response, mild restriction, TLC 76%, DLCO 71%\par Nikolai 2/4/20: normal\par \par Impression:\par Sarcoidosis, was symptomatic, tapered off prednisone in March 2020\par Now with similar chest pain, elevated inflammatory markers\par \par Plan:\par Really she has not improved much on 20mg prednisone x 4 weeks and she is having hyperglycemia. Will drop dose to 15mg w plan to taper slowly. I will ask Dr Contreras to re-evaluate for the prominent chest pain and lightheadedness complaints. I will also get a new CT chest after he evaluates her.

## 2021-04-23 NOTE — HISTORY OF PRESENT ILLNESS
[TextBox_4] : 05/06/2019: Asked to evaluate patient by Dr Myers for sarcoidosis. Here w daughter. Started losing weight about 3 mos ago, 30 lbs. CXR showed adenopathy, and mediastinoscopy showed noncaseating granulomas. Additional symptoms include muscle cramps, dry mouth, change in taste, fatigue. + chest discomfort in chest, no palps. Coughing since biopsy, dry. No vision changes. No arthritis/arthralgia. No EN. No fever but did have chills at the beginning. She is diabetic and Invokana was just added; remains on metformin.  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 Required Augmentin for sinusitis in the summer 2019. Inc wheezing on prendisone 15mg. Sept 2019 seen in ED for L chest and abd pain and given pantoprazole. Last seen in Feb 2020 on 10mg prednisone doing great, using Symbicort prn, resolved abdominal pain on pantoprazole. Plan was to taper to off.\par \par 8/7/20: She did taper off prednisone as we had planned and had no recurrence of symptoms at all. No cough, no fatigue, no complaints, feels great without any symptoms. Has still rarely used Symbicort for cough but not using it now. Did not have Covid, has been well. Daughter is being evaluated for an autoimmune disease.\par \par 3/29/21: For about 3 mos she's had chest pain that she describes as being like "labor pain," awakening her from sleep but also happening during the day, not necessarily every day but as much as twice a day, lasting a minute or so. This is the same kind of pain she had leading to diagnosis of sarcoid. Also having nocturnal "pulling" leg pain waking her from sleep, hands and fingers are also cramping. Also low back pain. No weight loss, no fever. Had Covid vaccine x 2, Pfizer. Has been off prednisone for about a year. No dyspnea. Diabetes up and down. Saw Dr Contreras, treadmill echo was fine. Restarted a low dose of prednisone.\par \par 4/9/21: Telehealth visit (both in [place]) to follow up on the response to prednisone. She feels somewhat better. She is still experiencing the same chest pain, but it is less frequent - went a week between episodes. Having less cramping. Overall symptoms still present but better. Had virtual visit w Dr Parham who is going to add Augmentin for sinus disease. Has not been noting increased glucose readings on the prednisone.\par \par 4/23/21: Telehealth visit (both in [place]) to follow up on the response to prednisone; she consents to telehealth services and we are the only people on the call. Cont on 20mg prednisone x 4 weeks. Had an episode of elevated BP and glucose and vomiting last weekend but resolved. Maybe 3 episodes of pain per week. Still w muscle cramps. Lightheadedness.

## 2021-05-11 ENCOUNTER — APPOINTMENT (OUTPATIENT)
Dept: HEART AND VASCULAR | Facility: CLINIC | Age: 61
End: 2021-05-11
Payer: COMMERCIAL

## 2021-05-11 VITALS
SYSTOLIC BLOOD PRESSURE: 144 MMHG | WEIGHT: 163 LBS | HEIGHT: 63 IN | HEART RATE: 95 BPM | DIASTOLIC BLOOD PRESSURE: 88 MMHG | OXYGEN SATURATION: 98 % | BODY MASS INDEX: 28.88 KG/M2 | TEMPERATURE: 97.8 F | RESPIRATION RATE: 14 BRPM

## 2021-05-11 VITALS — SYSTOLIC BLOOD PRESSURE: 134 MMHG | DIASTOLIC BLOOD PRESSURE: 80 MMHG

## 2021-05-11 PROCEDURE — 36415 COLL VENOUS BLD VENIPUNCTURE: CPT

## 2021-05-11 PROCEDURE — 93000 ELECTROCARDIOGRAM COMPLETE: CPT

## 2021-05-11 PROCEDURE — 99072 ADDL SUPL MATRL&STAF TM PHE: CPT

## 2021-05-11 PROCEDURE — 99214 OFFICE O/P EST MOD 30 MIN: CPT

## 2021-05-11 RX ORDER — AMOXICILLIN AND CLAVULANATE POTASSIUM 875; 125 MG/1; MG/1
875-125 TABLET, COATED ORAL
Qty: 28 | Refills: 0 | Status: DISCONTINUED | COMMUNITY
Start: 2021-04-07 | End: 2021-05-11

## 2021-05-11 NOTE — HISTORY OF PRESENT ILLNESS
[FreeTextEntry1] : 60 year female who notes resolution of chest pain and steroids were tapered. She had a return of her chest pain that has not responded to restarting steroids. She notes a sense of burping followed by pain that remains and is severe. Her stool is very dark to the point like it is black. She has an abnormal sensation when going to the bathroom. She notes right sided facial swelling and imbalance. Her chest pain in the center or her chest and feels like "labor pain". It does not radiate. She can awaken from sleep with the pain. Her chest pain started 3 months ago

## 2021-05-11 NOTE — ASSESSMENT
[FreeTextEntry1] : Atypical chest pain, Sarcoidosis, DM, GI and Neuro symptoms, hypertension--Start Atorvastatin at 10 mg daily and monitor LFTs. Add Losartan for BP. Labs drawn and sent. CTA of coronaries/heart--See Dr Olivera next week to continue workup. If pain worsens to go to ER

## 2021-05-11 NOTE — REVIEW OF SYSTEMS
[Headache] : headache [Chest Discomfort] : chest discomfort [SOB] : no shortness of breath [Dyspnea on exertion] : not dyspnea during exertion [Lower Ext Edema] : no extremity edema [Leg Claudication] : no intermittent leg claudication [Palpitations] : no palpitations [Orthopnea] : no orthopnea [PND] : no PND [Syncope] : no syncope [Cough] : no cough [Wheezing] : no wheezing

## 2021-05-11 NOTE — PHYSICAL EXAM
[Normal] : well developed, well nourished, no acute distress [Normal S1, S2] : normal S1, S2 [Clear Lung Fields] : clear lung fields [Normal Gait] : normal gait [No Rash] : no rash [Moves all extremities] : moves all extremities

## 2021-05-12 LAB
ALBUMIN SERPL ELPH-MCNC: 4.3 G/DL
ALP BLD-CCNC: 96 U/L
ALT SERPL-CCNC: 25 U/L
ANION GAP SERPL CALC-SCNC: 17 MMOL/L
AST SERPL-CCNC: 12 U/L
BASOPHILS # BLD AUTO: 0.02 K/UL
BASOPHILS NFR BLD AUTO: 0.2 %
BILIRUB SERPL-MCNC: 0.4 MG/DL
BUN SERPL-MCNC: 21 MG/DL
CALCIUM SERPL-MCNC: 9.6 MG/DL
CHLORIDE SERPL-SCNC: 99 MMOL/L
CO2 SERPL-SCNC: 23 MMOL/L
CREAT SERPL-MCNC: 0.52 MG/DL
EOSINOPHIL # BLD AUTO: 0.05 K/UL
EOSINOPHIL NFR BLD AUTO: 0.5 %
GLUCOSE SERPL-MCNC: 265 MG/DL
HCT VFR BLD CALC: 45.6 %
HGB BLD-MCNC: 14.3 G/DL
IMM GRANULOCYTES NFR BLD AUTO: 0.9 %
IRON SATN MFR SERPL: 36 %
IRON SERPL-MCNC: 105 UG/DL
LYMPHOCYTES # BLD AUTO: 0.96 K/UL
LYMPHOCYTES NFR BLD AUTO: 10.2 %
MAN DIFF?: NORMAL
MCHC RBC-ENTMCNC: 31.4 GM/DL
MCHC RBC-ENTMCNC: 31.6 PG
MCV RBC AUTO: 100.7 FL
MONOCYTES # BLD AUTO: 0.44 K/UL
MONOCYTES NFR BLD AUTO: 4.7 %
NEUTROPHILS # BLD AUTO: 7.86 K/UL
NEUTROPHILS NFR BLD AUTO: 83.5 %
NT-PROBNP SERPL-MCNC: 33 PG/ML
PLATELET # BLD AUTO: 254 K/UL
POTASSIUM SERPL-SCNC: 4.7 MMOL/L
PROT SERPL-MCNC: 6.7 G/DL
RBC # BLD: 4.53 M/UL
RBC # FLD: 14.1 %
SODIUM SERPL-SCNC: 140 MMOL/L
TIBC SERPL-MCNC: 291 UG/DL
UIBC SERPL-MCNC: 186 UG/DL
WBC # FLD AUTO: 9.41 K/UL

## 2021-05-18 ENCOUNTER — APPOINTMENT (OUTPATIENT)
Dept: INTERNAL MEDICINE | Facility: CLINIC | Age: 61
End: 2021-05-18
Payer: COMMERCIAL

## 2021-05-18 VITALS
SYSTOLIC BLOOD PRESSURE: 128 MMHG | DIASTOLIC BLOOD PRESSURE: 80 MMHG | TEMPERATURE: 98.6 F | OXYGEN SATURATION: 97 % | HEIGHT: 63 IN | RESPIRATION RATE: 14 BRPM | BODY MASS INDEX: 28.88 KG/M2 | HEART RATE: 94 BPM | WEIGHT: 163 LBS

## 2021-05-18 DIAGNOSIS — R42 DIZZINESS AND GIDDINESS: ICD-10-CM

## 2021-05-18 PROCEDURE — 99214 OFFICE O/P EST MOD 30 MIN: CPT

## 2021-05-18 PROCEDURE — 99072 ADDL SUPL MATRL&STAF TM PHE: CPT

## 2021-05-18 NOTE — HISTORY OF PRESENT ILLNESS
[FreeTextEntry1] : DM follow up [de-identified] : 58 yo f with DM, Sarcoidosis, recent atypical cp.\par labs performed yesterday with Dr Contreras\par was weaned off steroids then back on\par she gets occasional chest  pain.  \par s/p eval with Dr Parham - sinus congestion-  treated with abx but no sig change\par again trying to wean off steroids.  \par Last month on getting occasional dizziness/ lightheaded, worried about falling over.  in her bathroom. \par Last fs  165. \par Plan for CT  cardiac tomorrow \par no recent mammo - \par had colonoscopy with Dr Gayle

## 2021-05-18 NOTE — PLAN
[FreeTextEntry1] : Etiology of symptoms unclear.\par f/up for Cardiac cta tomorrow\par Rec neurology eval as well with her sarcoid as it can present in different ways.\par no change in medications at this time

## 2021-05-18 NOTE — REVIEW OF SYSTEMS
[Fatigue] : fatigue [Vision Problems] : vision problems [Nasal Discharge] : nasal discharge [Sore Throat] : no sore throat [Chest Pain] : chest pain [Dyspnea on Exertion] : dyspnea on exertion [Joint Pain] : joint pain [Dizziness] : dizziness [Negative] : Psychiatric

## 2021-05-20 ENCOUNTER — NON-APPOINTMENT (OUTPATIENT)
Age: 61
End: 2021-05-20

## 2021-05-21 ENCOUNTER — APPOINTMENT (OUTPATIENT)
Dept: OTOLARYNGOLOGY | Facility: CLINIC | Age: 61
End: 2021-05-21
Payer: COMMERCIAL

## 2021-05-21 VITALS
SYSTOLIC BLOOD PRESSURE: 116 MMHG | BODY MASS INDEX: 28.88 KG/M2 | DIASTOLIC BLOOD PRESSURE: 82 MMHG | HEIGHT: 63 IN | HEART RATE: 93 BPM | WEIGHT: 163 LBS | TEMPERATURE: 98.2 F

## 2021-05-21 DIAGNOSIS — J32.8 OTHER CHRONIC SINUSITIS: ICD-10-CM

## 2021-05-21 PROCEDURE — 99214 OFFICE O/P EST MOD 30 MIN: CPT | Mod: 25

## 2021-05-21 PROCEDURE — 99072 ADDL SUPL MATRL&STAF TM PHE: CPT

## 2021-05-21 PROCEDURE — 31231 NASAL ENDOSCOPY DX: CPT

## 2021-05-23 NOTE — CONSULT LETTER
[Dear  ___] : Dear  [unfilled], [Courtesy Letter:] : I had the pleasure of seeing your patient, [unfilled], in my office today. [Please see my note below.] : Please see my note below. [Consult Closing:] : Thank you very much for allowing me to participate in the care of this patient.  If you have any questions, please do not hesitate to contact me. [Sincerely,] : Sincerely, [FreeTextEntry2] : Deepak Olivera MD [FreeTextEntry1] : \par  [FreeTextEntry3] : \par Shari Parham MD \par Otolaryngology, Head and Neck Surgery \par \par   [DrCasandra  ___] : Dr. MELLO

## 2021-05-23 NOTE — PROCEDURE
[FreeTextEntry6] : \par Indication:  chronic sinusitis\par -Verbal consent was obtained from patient prior to exam. \par - Ar-Synephrine and lidocaine 2% spray applied to nose bilaterally.\par Nasal endoscopy was performed with flexible scope.\par Findings: \par -- Inferior turbinates normal  bilateral.  Inferior meatus clear bilateral.\par -- Septum was mildly to the left\par -- No polyps either side nose\par -- Tiny yellow mucous on right bulla remnant.  Right maxillary antrostomy is visible and patent.  Uncinate remnant and mostly intact ethmoids cells are visible.\par -- Left uncinate remnant larger.  Slight are of maxillary antrostomy is visible.  Most of ethmoid cells are intact.\par -- Middle turbinates partially resected bilateral.  Middle meatus clear bilateral. \par -- Nasopharynx without mass or exudate\par -- Adenoids were absent    \par -- Eustachian orifices were clear bilateral\par \par The patient tolerated the procedure well.\par

## 2021-05-23 NOTE — HISTORY OF PRESENT ILLNESS
[de-identified] : Ms. POWELL presents for chronic right facial swelling for a few weeks\par Right facial  swelling is nontender and fluctuates. Still gets dependent right nasal congestion.  Treated w/ Augmentin for sinusitis in early April 2021.   No dental/mouth pain, skin irritation, trouble swallowing or facial trauma.  She had a left "dental boil" in March.\par In March 2021, Dr. Barnes put her back on oral steroids for sarcoid because she was getting bad pain (like labor pain) radiating from right neck into sternal area of chest sternal after getting a burpy feeling. (chest pain preceded her original diagnosis in 2019)   Symptoms had improved in frequency but persist; she is getting tapered off the steroids. \par Pulmonary sarcoidosis diagnosis was confirmed after mediastinoscopy and LN bx showed noncaseating granulomas in 2019. Was on prednisone until tapered off by Aug 2020.\par \par For over a month, she often gets lightheaded and feels like she needs to lie down. She is going to see a Neurologist for this issue.\par \par VISIT 4/07/2021:\par Ms. Powell still has dependent right nasal congestion and chronic pressure in right maxillary area. No change in these sx or her chest pain after started prednisone 20 mg last week for sarcoidosis flare (had same type of chest pain as preceded her original diagnosis in 2019).\par CT sinus was done to evaluate the left upper gum "boil" seen by the dentist.\par Still with chronic right postnasal drip and mucus in throat, which she has had x years. \par Over past few days, feels a little dizzy when she gets of bed and has pressure right side of head.\par Hx of pulmonary sarcoidosis, confirmed after mediastinoscopy and LN bx showed noncaseating granulomas in 2019. Was on prednisone until tapered off by Aug 2020.\par \par Hx of thyroid nodules, last US in Feb 2021.\par She does not feel any lump in neck or throat pain\par On prelim US for FNA left thyroid nodule 1.9 cm in September 2019, no thyroid nodule seen, so no biopsy was done.\par USG FNA of left thyroid nodule twice was nondiagnostic (6/13/2019 and 7/03/2019) \par Left parotid mass was resolving on US, so not biopsied. This left parotid mass may have been sarcoid-related LN that diminished.\par \par \par CT MAXILLOFACIAL noncontrast (03/28/2021) at [facility]:\par - Prior Studies: No prior maxillofacial imaging.\par * Prior Surgery: There appears to have been prior right nasoantral window as well as uncinectomy, limited anterior ethmoidectomy and extensive middle turbinectomy. On the left, there has been both middle and inferior turbinoplasty.\par * Sinuses: Trace mucosal thickening is evident within the right maxillary alveolar recess and within bilateral anterior ethmoidal air cells. The remaining paranasal sinuses are clear.\par * Sinocranial and Sino-orbital Junctions: The anterior skull base and orbital walls are intact. Right sphenoethmoidal cell pneumatization is noted with the dominant right sphenoid sinus noted to extend into the right pterygoid process, greater sphenoid wing and dorsum sella.\par * Ostiomeatal Units: The right infundibulotomy bed is occluded by mucosal thickening, with a small component of the more posterior maxillary antrostomy as well as the nasoantral windows noted to be patent. The components of the left ostiomeatal unit are widely patent.\par * Frontal Sinus Outflow Tracts: Patent.\par * Sphenoethmoidal Recesses: Clear.\par * Maxillary Teeth: While there is no evidence of odontogenic cyst within either the maxillary or mandibular alveolar ridges, dental artifact precludes assessment of the adjacent soft tissues.\par * Nasal Cavity/Nasopharynx: There is prominent leftward septal deviation, with partial bilateral turbinate resections, as above. No discrete nasal or nasopharyngeal mass is identified\par * Orbits: Normal.\par * Brain: No hydrocephalus or large intracranial mass lesion.\par * Mastoids: Clear.\par IMPRESSION:\par In this patient with evidence of previous bilateral sinus surgery, only trace mucosal thickening is noted in the right maxillary alveolar recess and selected bilateral anterior ethmoidal air cells. \par While no large odontogenic cyst is identified within the left maxillary alveolar ridge, assessment of the adjacent soft tissues is severely limited by dental artifact.\par \par \par US THYROID (02/12/2021) at [facility]:\par - Prior study: Ultrasound thyroid dated 3/29/2019\par - RIGHT LOBE: 5 x 0.9 x 1.8 cm, homogenous, normal vascularity and contains 3 spongiform nodules.\par  --- 0.4 x 0.4 x 0.3 cm spongiform nodule in medial midpole, grossly stable\par  --- 0.5 x 0.3 x 0.4 cm spongiform nodule in lateral midpole, grossly stable\par  --- 0.5 x 0.3 x 0.5 cm spongiform nodule in lateral midpole, previously 0.2 x 0.2 x 0.2 cm\par - LEFT LOBE: 4.4 x 0.7 x 1.6 cm, homogenous, normal vascularity and contains no visible nodules.\par - ISTHMUS: 0.2 cm and contains no visible nodules.\par - CERVICAL LYMPH NODES: No abnormal lymph nodes are identified in the neck.\par - PARATHYROIDS: Not visualized.\par IMPRESSION:\par 1.) Since 3/29/2019, there has been resolution of left thyroid lobe nodule.\par 2.) 3 spongiform nodules in the right thyroid lobe do not meet the criteria for FNA biopsy.\par \par \par US NECK (9/17/2019) at [facility]:\par - Limited images of left thyroid lobe demonstrate no evidence of a nodule. The lobe is homogeneous.\par - Biopsy cancelled\par \par USG FNA Left thyroid nodule, upper pole, 1.6 cm (7/03/2019) at [facility]:\par - Nondiagnostic\par \par USG FNA Left thyroid nodule, upper pole, 1.6 cm (6/13/2019) at [facility]:\par - Nondiagnostic (ThinPrep slid and cell block are acellular)\par \par US NECK (06/13/2019) at [facility]:\par - Comparison: MR brain 4/19/2019 \par - Targeted ultrasound images of the left parotid gland were performed. \par The parotid gland appears homogeneous and with normal vascularity. \par A 0.3 x 0.2 x 0.3 cm complex cystic lesion with an internal septation in the mid left parotid gland. \par IMPRESSION: \par There is a 0.3 cm complex cystic lesion with a septation in the left parotid gland. It has a nonspecific appearance.\par \par US THYROID (3/29/2019) at [facility]:\par - RIGHT thyroid lobe 4.5 x 1.0 x 1.9 cm, homogeneous, with 3 cysts\par  -- 0.2 x 0.2 x 0.2 cm cyst in midpole\par  -- 0.4 x 0.3 x 0.4 cm complex cyst with internal septations and debris in mid pole\par  -- 0.4 x 0.2 x 0.3 cm cyst with internal septation in the lower pole adjacent to isthmus\par - LEFT thyroid lobe 4.9 x 1.1 x 1.7 cm, homogeneous\par  -- 0.5 x 0.3 x 0.4 cm cyst in upper pole\par  -- 1.9 x 0.7 x 0.8 cm complex cystic and solid, including eccentric anterior solid component measuring 1.7 cm, hypoechoic; no microcalcifications\par - ISTHMUS 0.2 cm with no nodules\par - CERVICAL LYMPH NODES: No abnormal lymph nodes identified in neck\par - PARATHYROID GLANDS: Not visualized\par \par CT CHEST with iv contrast (3/27/2019) at [facility]:\par -- Comparison to CT 11/28/2017\par -- Interval development of significant mediastinal and bilateral hilar lymphadenopathy. \par  -- Right paratracheal lymph node, 1.9 cm. \par  -- Enlarged subcarinal lymph node, 1.8 cm\par  -- Enlarged bilateral hilar lymph nodes, up to 1.8 cm on right\par  -- No significant axillary adenopathy.\par - Biapical scarring, 5 mm nodule in RLL (previously 4 mm)\par - Enlarged paraesophageal lymph node , 1.1 cm\par - Few bilateral subcm hypodense thyroid nodules, up to 0.9 cm\par - Otherwise unremarkable\par

## 2021-05-23 NOTE — PHYSICAL EXAM
[Nasal Endoscopy Performed] : nasal endoscopy was performed, see procedure section for findings [Normal] : mucosa is normal [Midline] : trachea located in midline position [Removed] : palatine tonsils previously removed [FreeTextEntry1] : No hoarseness.  [de-identified] : Minimal swelling, nontender, of right parotid gland.  No palpable thyroid nodule. [de-identified] : Right TMJ subluxation , nontender  [de-identified] : SMall nontender LNs - 1.5 cm in right level 2 and 1 cm in left level 2.

## 2021-06-02 ENCOUNTER — RX RENEWAL (OUTPATIENT)
Age: 61
End: 2021-06-02

## 2021-06-03 ENCOUNTER — APPOINTMENT (OUTPATIENT)
Dept: NEUROLOGY | Facility: CLINIC | Age: 61
End: 2021-06-03
Payer: COMMERCIAL

## 2021-06-03 VITALS
BODY MASS INDEX: 29.77 KG/M2 | HEART RATE: 97 BPM | DIASTOLIC BLOOD PRESSURE: 88 MMHG | WEIGHT: 168 LBS | HEIGHT: 63 IN | SYSTOLIC BLOOD PRESSURE: 138 MMHG

## 2021-06-03 DIAGNOSIS — Z87.19 PERSONAL HISTORY OF OTHER DISEASES OF THE DIGESTIVE SYSTEM: ICD-10-CM

## 2021-06-03 PROCEDURE — 99214 OFFICE O/P EST MOD 30 MIN: CPT

## 2021-06-03 PROCEDURE — 99072 ADDL SUPL MATRL&STAF TM PHE: CPT

## 2021-06-03 NOTE — DATA REVIEWED
[de-identified] : MRI brain 2 years ago was normal [de-identified] : No anemia on recent blood tests.

## 2021-06-03 NOTE — PHYSICAL EXAM
[FreeTextEntry1] : No cognitive or communication deficits.  Visual fields are full to confrontation.  Pupils are equal and constrict to light.  Extraocular movements intact.  Smile symmetric.  No hearing loss.  Palate rises symmetrically and tongue protrudes in midline.  Neck is supple.  No bruits heard.  Posterior right scalp tender to palpation.  No focal weakness.  Tendon reflexes are hypoactive and plantar responses are flexor.  No focal sensory loss.  Gait and coordination intact.  I asked patient to hyperventilate and within 15 seconds her lightheadedness was mimicked.

## 2021-06-03 NOTE — HISTORY OF PRESENT ILLNESS
[FreeTextEntry1] : 60-year-old woman with history of sarcoidosis diagnosed 2 years ago has been complaining of episodic "lightheadedness" and some tenderness over the right posterior scalp region.  3 months ago she was restarted on prednisone, currently 15 mg daily.  She has been complaining of episodic substernal chest pain.  Cardiac work-up reported to be unrevealing.  She reports that 2 weeks ago she had "black" stool.  She also has been complaining of episodic cramps in her hands and feet mostly at night.\par \par She has a history of hypertension and diabetes mellitus type 2.

## 2021-06-03 NOTE — ASSESSMENT
[FreeTextEntry1] : Her lightheadedness is most likely secondary to hyperventilation.  Her atypical chest pain may be related to reflux or ulcer disease and I advised GI opinion.\par \par I explained nature of hyperventilation which is often due to anxiety.  Encourage diet and exercise and return for reevaluation in 3 months.

## 2021-06-03 NOTE — CONSULT LETTER
[Consult Letter:] : I had the pleasure of evaluating your patient, [unfilled]. [Please see my note below.] : Please see my note below. [Consult Closing:] : Thank you very much for allowing me to participate in the care of this patient.  If you have any questions, please do not hesitate to contact me. [Sincerely,] : Sincerely, [FreeTextEntry2] : Deepak Olivera MD

## 2021-06-16 ENCOUNTER — RX RENEWAL (OUTPATIENT)
Age: 61
End: 2021-06-16

## 2021-06-28 ENCOUNTER — RX RENEWAL (OUTPATIENT)
Age: 61
End: 2021-06-28

## 2021-06-29 ENCOUNTER — APPOINTMENT (OUTPATIENT)
Dept: ULTRASOUND IMAGING | Facility: HOSPITAL | Age: 61
End: 2021-06-29
Payer: MEDICAID

## 2021-06-29 ENCOUNTER — OUTPATIENT (OUTPATIENT)
Dept: OUTPATIENT SERVICES | Facility: HOSPITAL | Age: 61
LOS: 1 days | End: 2021-06-29
Payer: COMMERCIAL

## 2021-06-29 DIAGNOSIS — Z41.9 ENCOUNTER FOR PROCEDURE FOR PURPOSES OTHER THAN REMEDYING HEALTH STATE, UNSPECIFIED: Chronic | ICD-10-CM

## 2021-06-29 DIAGNOSIS — Z98.890 OTHER SPECIFIED POSTPROCEDURAL STATES: Chronic | ICD-10-CM

## 2021-06-29 PROCEDURE — 76536 US EXAM OF HEAD AND NECK: CPT

## 2021-06-29 PROCEDURE — 76536 US EXAM OF HEAD AND NECK: CPT | Mod: 26

## 2021-06-30 ENCOUNTER — APPOINTMENT (OUTPATIENT)
Dept: PULMONOLOGY | Facility: CLINIC | Age: 61
End: 2021-06-30
Payer: COMMERCIAL

## 2021-06-30 VITALS
OXYGEN SATURATION: 98 % | DIASTOLIC BLOOD PRESSURE: 84 MMHG | SYSTOLIC BLOOD PRESSURE: 110 MMHG | HEART RATE: 95 BPM | TEMPERATURE: 97.3 F | WEIGHT: 168 LBS | BODY MASS INDEX: 29.77 KG/M2 | HEIGHT: 63 IN

## 2021-06-30 PROCEDURE — 99072 ADDL SUPL MATRL&STAF TM PHE: CPT

## 2021-06-30 PROCEDURE — 99214 OFFICE O/P EST MOD 30 MIN: CPT

## 2021-06-30 NOTE — HISTORY OF PRESENT ILLNESS
[TextBox_4] : 05/06/2019: Asked to evaluate patient by Dr Myers for sarcoidosis. Here w daughter. Started losing weight about 3 mos ago, 30 lbs. CXR showed adenopathy, and mediastinoscopy showed noncaseating granulomas. Additional symptoms include muscle cramps, dry mouth, change in taste, fatigue. + chest discomfort in chest, no palps. Coughing since biopsy, dry. No vision changes. No arthritis/arthralgia. No EN. No fever but did have chills at the beginning. She is diabetic and Invokana was just added; remains on metformin.  today. 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 Started pred 30mg and Dr Gayle started HBV ppx.\par \par On prednisone energy and cough improved. Required Augmentin for sinusitis in the summer 2019. Inc wheezing on prendisone 15mg. Sept 2019 seen in ED for L chest and abd pain and given pantoprazole. Last seen in Feb 2020 on 10mg prednisone doing great, using Symbicort prn, resolved abdominal pain on pantoprazole. Plan was to taper to off.\par \par 8/7/20: She did taper off prednisone as we had planned and had no recurrence of symptoms at all. No cough, no fatigue, no complaints, feels great without any symptoms. Has still rarely used Symbicort for cough but not using it now. Did not have Covid, has been well. Daughter is being evaluated for an autoimmune disease.\par \par 3/29/21: For about 3 mos she's had chest pain that she describes as being like "labor pain," awakening her from sleep but also happening during the day, not necessarily every day but as much as twice a day, lasting a minute or so. This is the same kind of pain she had leading to diagnosis of sarcoid. Also having nocturnal "pulling" leg pain waking her from sleep, hands and fingers are also cramping. Also low back pain. No weight loss, no fever. Had Covid vaccine x 2, Pfizer. Has been off prednisone for about a year. No dyspnea. Diabetes up and down. Saw Dr Contreras, treadmill echo was fine. Restarted a low dose of prednisone.\par \par 4/9/21: Telehealth visit (both in [place]) to follow up on the response to prednisone. She feels somewhat better. She is still experiencing the same chest pain, but it is less frequent - went a week between episodes. Having less cramping. Overall symptoms still present but better. Had virtual visit w Dr Parham who is going to add Augmentin for sinus disease. Has not been noting increased glucose readings on the prednisone.\par \par 4/23/21: Telehealth visit (both in [place]) to follow up on the response to prednisone; she consents to telehealth services and we are the only people on the call. Cont on 20mg prednisone x 4 weeks. Had an episode of elevated BP and glucose and vomiting last weekend but resolved. Maybe 3 episodes of pain per week. Still w muscle cramps. Lightheadedness.\par \par 6/30/21: Has seen neurology who felt that her lightheadedness may be due to hyperventilation. The chest pain is not really better or worse. For various reasons she has not been able to have her CT scan (cardiac and lung). Has been rescheduled 7/24. Legs feel week. Feet are cramped, bad enough to keep her from sleeping. Fatigued again - more sleepy actually. No weight loss, is actually gaining. No clinical change on reducing prednisone from 20mg to 15mg. Blood sugars have been elevated.

## 2021-06-30 NOTE — ASSESSMENT
[FreeTextEntry1] : Data reviewed:\par \par Labs 3/30/21: CBC normal, gluc 181, ESR 18, CRP 7 (elev), ACE 83 (uln 82)\par \par ECG and echo Dr Contreras 4/2019 - ok\par \par CT chest [facility] 3/27/19: Interval development of significant mediastinal and bilateral hilar lymphadenopathy as above which is of uncertain etiology. 5 mm nodule in the right lower lobe, previously measuring 4 mm. Enlarged paraesophageal lymph node also noted. Few bilateral subcentimeter hypodense thyroid nodules. \par \par PA/lat CXR in office 3/29/21: clear lungs, no change from prior\par \par Surg path 4/17/19: nonnecrotizing granulomas / fungal & AFB stains neg / flow neg\par \par PFT 5/7/19: nonspecific ventilatory abnormality, no BD response, TLC 89%, DLCO 70%\par PFT 8/28/19: no obstruction, no BD response, mild restriction, TLC 76%, DLCO 71%\par Nikolai 2/4/20: normal\par \par Impression:\par Sarcoidosis, was symptomatic, tapered off prednisone in March 2020\par Now with similar chest pain and cramps, without weight loss\par \par Plan:\par Unclear to me if this is due to sarcoid but symptoms are similar.\par I only started her on 20mg prednisone this time vs 30mg that she responded to initially. Will bump her back to 30mg and see her back after she has the CT chest. Will also repeat her labs.

## 2021-07-01 LAB
ALBUMIN SERPL ELPH-MCNC: 4.2 G/DL
ALP BLD-CCNC: 106 U/L
ALT SERPL-CCNC: 29 U/L
ANION GAP SERPL CALC-SCNC: 16 MMOL/L
AST SERPL-CCNC: 19 U/L
BASOPHILS # BLD AUTO: 0.02 K/UL
BASOPHILS NFR BLD AUTO: 0.2 %
BILIRUB SERPL-MCNC: 0.5 MG/DL
BUN SERPL-MCNC: 15 MG/DL
CALCIUM SERPL-MCNC: 9.5 MG/DL
CHLORIDE SERPL-SCNC: 100 MMOL/L
CO2 SERPL-SCNC: 20 MMOL/L
CREAT SERPL-MCNC: 0.57 MG/DL
CRP SERPL-MCNC: 7 MG/L
EOSINOPHIL # BLD AUTO: 0.07 K/UL
EOSINOPHIL NFR BLD AUTO: 0.8 %
ERYTHROCYTE [SEDIMENTATION RATE] IN BLOOD BY WESTERGREN METHOD: 6 MM/HR
GLUCOSE SERPL-MCNC: 401 MG/DL
HCT VFR BLD CALC: 44.1 %
HGB BLD-MCNC: 13.6 G/DL
IMM GRANULOCYTES NFR BLD AUTO: 0.6 %
LYMPHOCYTES # BLD AUTO: 0.88 K/UL
LYMPHOCYTES NFR BLD AUTO: 9.7 %
MAN DIFF?: NORMAL
MCHC RBC-ENTMCNC: 30.8 GM/DL
MCHC RBC-ENTMCNC: 31.6 PG
MCV RBC AUTO: 102.3 FL
MONOCYTES # BLD AUTO: 0.48 K/UL
MONOCYTES NFR BLD AUTO: 5.3 %
NEUTROPHILS # BLD AUTO: 7.55 K/UL
NEUTROPHILS NFR BLD AUTO: 83.4 %
PLATELET # BLD AUTO: 218 K/UL
POTASSIUM SERPL-SCNC: 4.8 MMOL/L
PROT SERPL-MCNC: 6.3 G/DL
RBC # BLD: 4.31 M/UL
RBC # FLD: 14 %
SODIUM SERPL-SCNC: 136 MMOL/L
WBC # FLD AUTO: 9.05 K/UL

## 2021-07-02 LAB — ACE BLD-CCNC: 62 U/L

## 2021-07-03 ENCOUNTER — INPATIENT (INPATIENT)
Facility: HOSPITAL | Age: 61
LOS: 3 days | Discharge: HOME HEALTH | End: 2021-07-07
Attending: HOSPITALIST | Admitting: HOSPITALIST
Payer: COMMERCIAL

## 2021-07-03 VITALS
HEART RATE: 82 BPM | SYSTOLIC BLOOD PRESSURE: 157 MMHG | OXYGEN SATURATION: 100 % | TEMPERATURE: 98 F | DIASTOLIC BLOOD PRESSURE: 90 MMHG | HEIGHT: 63 IN | RESPIRATION RATE: 16 BRPM

## 2021-07-03 DIAGNOSIS — E11.9 TYPE 2 DIABETES MELLITUS WITHOUT COMPLICATIONS: ICD-10-CM

## 2021-07-03 DIAGNOSIS — Z41.9 ENCOUNTER FOR PROCEDURE FOR PURPOSES OTHER THAN REMEDYING HEALTH STATE, UNSPECIFIED: Chronic | ICD-10-CM

## 2021-07-03 DIAGNOSIS — I10 ESSENTIAL (PRIMARY) HYPERTENSION: ICD-10-CM

## 2021-07-03 DIAGNOSIS — R42 DIZZINESS AND GIDDINESS: ICD-10-CM

## 2021-07-03 DIAGNOSIS — E87.2 ACIDOSIS: ICD-10-CM

## 2021-07-03 DIAGNOSIS — D86.9 SARCOIDOSIS, UNSPECIFIED: ICD-10-CM

## 2021-07-03 DIAGNOSIS — Z29.9 ENCOUNTER FOR PROPHYLACTIC MEASURES, UNSPECIFIED: ICD-10-CM

## 2021-07-03 DIAGNOSIS — Z98.890 OTHER SPECIFIED POSTPROCEDURAL STATES: Chronic | ICD-10-CM

## 2021-07-03 DIAGNOSIS — R07.9 CHEST PAIN, UNSPECIFIED: ICD-10-CM

## 2021-07-03 DIAGNOSIS — E11.65 TYPE 2 DIABETES MELLITUS WITH HYPERGLYCEMIA: ICD-10-CM

## 2021-07-03 LAB
ALBUMIN SERPL ELPH-MCNC: 4 G/DL — SIGNIFICANT CHANGE UP (ref 3.3–5)
ALP SERPL-CCNC: 99 U/L — SIGNIFICANT CHANGE UP (ref 40–120)
ALT FLD-CCNC: 31 U/L — SIGNIFICANT CHANGE UP (ref 4–33)
ANION GAP SERPL CALC-SCNC: 17 MMOL/L — HIGH (ref 7–14)
AST SERPL-CCNC: 20 U/L — SIGNIFICANT CHANGE UP (ref 4–32)
BASE EXCESS BLDV CALC-SCNC: -2 MMOL/L — SIGNIFICANT CHANGE UP (ref -3–2)
BASOPHILS # BLD AUTO: 0.03 K/UL — SIGNIFICANT CHANGE UP (ref 0–0.2)
BASOPHILS NFR BLD AUTO: 0.3 % — SIGNIFICANT CHANGE UP (ref 0–2)
BILIRUB SERPL-MCNC: 0.6 MG/DL — SIGNIFICANT CHANGE UP (ref 0.2–1.2)
BLOOD GAS VENOUS - CREATININE: SIGNIFICANT CHANGE UP MG/DL (ref 0.5–1.3)
BLOOD GAS VENOUS COMPREHENSIVE RESULT: SIGNIFICANT CHANGE UP
BLOOD GAS VENOUS COMPREHENSIVE RESULT: SIGNIFICANT CHANGE UP
BUN SERPL-MCNC: 14 MG/DL — SIGNIFICANT CHANGE UP (ref 7–23)
CALCIUM SERPL-MCNC: 9.3 MG/DL — SIGNIFICANT CHANGE UP (ref 8.4–10.5)
CHLORIDE BLDV-SCNC: 104 MMOL/L — SIGNIFICANT CHANGE UP (ref 96–108)
CHLORIDE SERPL-SCNC: 99 MMOL/L — SIGNIFICANT CHANGE UP (ref 98–107)
CO2 SERPL-SCNC: 20 MMOL/L — LOW (ref 22–31)
CREAT SERPL-MCNC: 0.49 MG/DL — LOW (ref 0.5–1.3)
EOSINOPHIL # BLD AUTO: 0.05 K/UL — SIGNIFICANT CHANGE UP (ref 0–0.5)
EOSINOPHIL NFR BLD AUTO: 0.6 % — SIGNIFICANT CHANGE UP (ref 0–6)
GAS PNL BLDV: 136 MMOL/L — SIGNIFICANT CHANGE UP (ref 136–146)
GLUCOSE BLDC GLUCOMTR-MCNC: 184 MG/DL — HIGH (ref 70–99)
GLUCOSE BLDC GLUCOMTR-MCNC: 193 MG/DL — HIGH (ref 70–99)
GLUCOSE BLDC GLUCOMTR-MCNC: 277 MG/DL — HIGH (ref 70–99)
GLUCOSE BLDC GLUCOMTR-MCNC: 294 MG/DL — HIGH (ref 70–99)
GLUCOSE BLDV-MCNC: 384 MG/DL — HIGH (ref 70–99)
GLUCOSE SERPL-MCNC: 409 MG/DL — HIGH (ref 70–99)
HCO3 BLDV-SCNC: 21 MMOL/L — SIGNIFICANT CHANGE UP (ref 20–27)
HCT VFR BLD CALC: 41.3 % — SIGNIFICANT CHANGE UP (ref 34.5–45)
HCT VFR BLDA CALC: 41.9 % — SIGNIFICANT CHANGE UP (ref 34.5–46.5)
HGB BLD CALC-MCNC: 13.7 G/DL — SIGNIFICANT CHANGE UP (ref 11.5–15.5)
HGB BLD-MCNC: 13.7 G/DL — SIGNIFICANT CHANGE UP (ref 11.5–15.5)
IANC: 7.26 K/UL — SIGNIFICANT CHANGE UP (ref 1.5–8.5)
IMM GRANULOCYTES NFR BLD AUTO: 0.7 % — SIGNIFICANT CHANGE UP (ref 0–1.5)
LACTATE BLDV-MCNC: 6.7 MMOL/L — CRITICAL HIGH (ref 0.5–2)
LYMPHOCYTES # BLD AUTO: 0.87 K/UL — LOW (ref 1–3.3)
LYMPHOCYTES # BLD AUTO: 10 % — LOW (ref 13–44)
MCHC RBC-ENTMCNC: 30.9 PG — SIGNIFICANT CHANGE UP (ref 27–34)
MCHC RBC-ENTMCNC: 33.2 GM/DL — SIGNIFICANT CHANGE UP (ref 32–36)
MCV RBC AUTO: 93.2 FL — SIGNIFICANT CHANGE UP (ref 80–100)
MONOCYTES # BLD AUTO: 0.44 K/UL — SIGNIFICANT CHANGE UP (ref 0–0.9)
MONOCYTES NFR BLD AUTO: 5.1 % — SIGNIFICANT CHANGE UP (ref 2–14)
NEUTROPHILS # BLD AUTO: 7.26 K/UL — SIGNIFICANT CHANGE UP (ref 1.8–7.4)
NEUTROPHILS NFR BLD AUTO: 83.3 % — HIGH (ref 43–77)
NRBC # BLD: 0 /100 WBCS — SIGNIFICANT CHANGE UP
NRBC # FLD: 0 K/UL — SIGNIFICANT CHANGE UP
PCO2 BLDV: 48 MMHG — SIGNIFICANT CHANGE UP (ref 41–51)
PH BLDV: 7.31 — LOW (ref 7.32–7.43)
PLATELET # BLD AUTO: 197 K/UL — SIGNIFICANT CHANGE UP (ref 150–400)
PO2 BLDV: 33 MMHG — LOW (ref 35–40)
POTASSIUM BLDV-SCNC: 4.9 MMOL/L — HIGH (ref 3.4–4.5)
POTASSIUM SERPL-MCNC: 4.6 MMOL/L — SIGNIFICANT CHANGE UP (ref 3.5–5.3)
POTASSIUM SERPL-SCNC: 4.6 MMOL/L — SIGNIFICANT CHANGE UP (ref 3.5–5.3)
PROT SERPL-MCNC: 6.6 G/DL — SIGNIFICANT CHANGE UP (ref 6–8.3)
RBC # BLD: 4.43 M/UL — SIGNIFICANT CHANGE UP (ref 3.8–5.2)
RBC # FLD: 12.7 % — SIGNIFICANT CHANGE UP (ref 10.3–14.5)
SAO2 % BLDV: 58.2 % — LOW (ref 60–85)
SARS-COV-2 RNA SPEC QL NAA+PROBE: SIGNIFICANT CHANGE UP
SODIUM SERPL-SCNC: 136 MMOL/L — SIGNIFICANT CHANGE UP (ref 135–145)
TROPONIN T, HIGH SENSITIVITY RESULT: <6 NG/L — SIGNIFICANT CHANGE UP
WBC # BLD: 8.71 K/UL — SIGNIFICANT CHANGE UP (ref 3.8–10.5)
WBC # FLD AUTO: 8.71 K/UL — SIGNIFICANT CHANGE UP (ref 3.8–10.5)

## 2021-07-03 PROCEDURE — 71046 X-RAY EXAM CHEST 2 VIEWS: CPT | Mod: 26

## 2021-07-03 PROCEDURE — 93010 ELECTROCARDIOGRAM REPORT: CPT

## 2021-07-03 PROCEDURE — 99223 1ST HOSP IP/OBS HIGH 75: CPT

## 2021-07-03 PROCEDURE — 99285 EMERGENCY DEPT VISIT HI MDM: CPT | Mod: 25

## 2021-07-03 RX ORDER — SODIUM CHLORIDE 9 MG/ML
1000 INJECTION, SOLUTION INTRAVENOUS
Refills: 0 | Status: DISCONTINUED | OUTPATIENT
Start: 2021-07-03 | End: 2021-07-03

## 2021-07-03 RX ORDER — INSULIN LISPRO 100/ML
5 VIAL (ML) SUBCUTANEOUS
Refills: 0 | Status: DISCONTINUED | OUTPATIENT
Start: 2021-07-03 | End: 2021-07-04

## 2021-07-03 RX ORDER — INSULIN LISPRO 100/ML
4 VIAL (ML) SUBCUTANEOUS
Refills: 0 | Status: DISCONTINUED | OUTPATIENT
Start: 2021-07-03 | End: 2021-07-03

## 2021-07-03 RX ORDER — ATORVASTATIN CALCIUM 80 MG/1
10 TABLET, FILM COATED ORAL AT BEDTIME
Refills: 0 | Status: DISCONTINUED | OUTPATIENT
Start: 2021-07-03 | End: 2021-07-07

## 2021-07-03 RX ORDER — GLUCAGON INJECTION, SOLUTION 0.5 MG/.1ML
1 INJECTION, SOLUTION SUBCUTANEOUS ONCE
Refills: 0 | Status: DISCONTINUED | OUTPATIENT
Start: 2021-07-03 | End: 2021-07-03

## 2021-07-03 RX ORDER — INSULIN LISPRO 100/ML
VIAL (ML) SUBCUTANEOUS
Refills: 0 | Status: DISCONTINUED | OUTPATIENT
Start: 2021-07-03 | End: 2021-07-03

## 2021-07-03 RX ORDER — FAMOTIDINE 10 MG/ML
20 INJECTION INTRAVENOUS ONCE
Refills: 0 | Status: COMPLETED | OUTPATIENT
Start: 2021-07-03 | End: 2021-07-03

## 2021-07-03 RX ORDER — INSULIN LISPRO 100/ML
VIAL (ML) SUBCUTANEOUS AT BEDTIME
Refills: 0 | Status: DISCONTINUED | OUTPATIENT
Start: 2021-07-03 | End: 2021-07-07

## 2021-07-03 RX ORDER — PANTOPRAZOLE SODIUM 20 MG/1
1 TABLET, DELAYED RELEASE ORAL
Qty: 0 | Refills: 0 | DISCHARGE

## 2021-07-03 RX ORDER — ASPIRIN/CALCIUM CARB/MAGNESIUM 324 MG
324 TABLET ORAL ONCE
Refills: 0 | Status: COMPLETED | OUTPATIENT
Start: 2021-07-03 | End: 2021-07-03

## 2021-07-03 RX ORDER — DEXTROSE 50 % IN WATER 50 %
25 SYRINGE (ML) INTRAVENOUS ONCE
Refills: 0 | Status: DISCONTINUED | OUTPATIENT
Start: 2021-07-03 | End: 2021-07-07

## 2021-07-03 RX ORDER — INSULIN LISPRO 100/ML
VIAL (ML) SUBCUTANEOUS
Refills: 0 | Status: DISCONTINUED | OUTPATIENT
Start: 2021-07-03 | End: 2021-07-07

## 2021-07-03 RX ORDER — LOSARTAN POTASSIUM 100 MG/1
25 TABLET, FILM COATED ORAL DAILY
Refills: 0 | Status: DISCONTINUED | OUTPATIENT
Start: 2021-07-03 | End: 2021-07-07

## 2021-07-03 RX ORDER — INSULIN LISPRO 100/ML
VIAL (ML) SUBCUTANEOUS AT BEDTIME
Refills: 0 | Status: DISCONTINUED | OUTPATIENT
Start: 2021-07-03 | End: 2021-07-03

## 2021-07-03 RX ORDER — ENOXAPARIN SODIUM 100 MG/ML
40 INJECTION SUBCUTANEOUS DAILY
Refills: 0 | Status: DISCONTINUED | OUTPATIENT
Start: 2021-07-03 | End: 2021-07-07

## 2021-07-03 RX ORDER — LOSARTAN POTASSIUM 100 MG/1
1 TABLET, FILM COATED ORAL
Qty: 0 | Refills: 0 | DISCHARGE

## 2021-07-03 RX ORDER — SODIUM CHLORIDE 9 MG/ML
1000 INJECTION, SOLUTION INTRAVENOUS ONCE
Refills: 0 | Status: COMPLETED | OUTPATIENT
Start: 2021-07-03 | End: 2021-07-03

## 2021-07-03 RX ORDER — ATORVASTATIN CALCIUM 80 MG/1
1 TABLET, FILM COATED ORAL
Qty: 0 | Refills: 0 | DISCHARGE

## 2021-07-03 RX ORDER — INSULIN GLARGINE 100 [IU]/ML
10 INJECTION, SOLUTION SUBCUTANEOUS AT BEDTIME
Refills: 0 | Status: DISCONTINUED | OUTPATIENT
Start: 2021-07-03 | End: 2021-07-03

## 2021-07-03 RX ORDER — ACETAMINOPHEN 500 MG
650 TABLET ORAL EVERY 6 HOURS
Refills: 0 | Status: DISCONTINUED | OUTPATIENT
Start: 2021-07-03 | End: 2021-07-07

## 2021-07-03 RX ORDER — DEXTROSE 50 % IN WATER 50 %
15 SYRINGE (ML) INTRAVENOUS ONCE
Refills: 0 | Status: DISCONTINUED | OUTPATIENT
Start: 2021-07-03 | End: 2021-07-07

## 2021-07-03 RX ORDER — INSULIN GLARGINE 100 [IU]/ML
15 INJECTION, SOLUTION SUBCUTANEOUS AT BEDTIME
Refills: 0 | Status: DISCONTINUED | OUTPATIENT
Start: 2021-07-03 | End: 2021-07-04

## 2021-07-03 RX ORDER — SODIUM CHLORIDE 9 MG/ML
1000 INJECTION INTRAMUSCULAR; INTRAVENOUS; SUBCUTANEOUS
Refills: 0 | Status: DISCONTINUED | OUTPATIENT
Start: 2021-07-03 | End: 2021-07-07

## 2021-07-03 RX ADMIN — Medication 5 UNIT(S): at 18:15

## 2021-07-03 RX ADMIN — FAMOTIDINE 20 MILLIGRAM(S): 10 INJECTION INTRAVENOUS at 14:18

## 2021-07-03 RX ADMIN — INSULIN GLARGINE 15 UNIT(S): 100 INJECTION, SOLUTION SUBCUTANEOUS at 22:31

## 2021-07-03 RX ADMIN — ATORVASTATIN CALCIUM 10 MILLIGRAM(S): 80 TABLET, FILM COATED ORAL at 22:32

## 2021-07-03 RX ADMIN — SODIUM CHLORIDE 75 MILLILITER(S): 9 INJECTION INTRAMUSCULAR; INTRAVENOUS; SUBCUTANEOUS at 16:03

## 2021-07-03 RX ADMIN — Medication 6: at 18:16

## 2021-07-03 RX ADMIN — SODIUM CHLORIDE 1000 MILLILITER(S): 9 INJECTION, SOLUTION INTRAVENOUS at 13:56

## 2021-07-03 RX ADMIN — Medication 324 MILLIGRAM(S): at 12:28

## 2021-07-03 RX ADMIN — Medication 30 MILLILITER(S): at 14:18

## 2021-07-03 NOTE — ED PROVIDER NOTE - CLINICAL SUMMARY MEDICAL DECISION MAKING FREE TEXT BOX
60y Female with PMHx of DM, Sarcoidosis currently on prednisone presents to the ER for lightheadedness. Pressure-like chest pain associated with shortness of breath and diaphoresis. Concern for ACS vs pulm issues, less likely GERD. Will get labs, CXR, EKG, reassess. Plan for cdu vs admission.
negative...

## 2021-07-03 NOTE — H&P ADULT - RS GEN PE MLT RESP DETAILS PC
normal/breath sounds equal/good air movement/respirations non-labored/clear to auscultation bilaterally

## 2021-07-03 NOTE — ED ADULT NURSE REASSESSMENT NOTE - NS ED NURSE REASSESS COMMENT FT1
Pt at baseline mental status, appears comfortable. Resp even and unlabored. NSR on monitor. VS as charted. Endorsing unchanged epigastric pain. Report given to [facility] 1 RN. Pt transported to area by PCA at this time.

## 2021-07-03 NOTE — H&P ADULT - NSICDXPASTSURGICALHX_GEN_ALL_CORE_FT
PAST SURGICAL HISTORY:  H/O nephrolithotomy with removal of calculi     Surgery, elective otolaryngologic

## 2021-07-03 NOTE — H&P ADULT - PROBLEM SELECTOR PLAN 3
- dizziness/lightheaded unrelated to movement  - orthostatic VS negative   - US head/neck on 06/29/2021 normal   - CTH?? - dizziness/lightheaded unrelated to movement  - orthostatic VS negative   - US head/neck on 06/29/2021 normal   - check MRI brain non con - lactate 5.6-->6.7  - likely due to metformin use  - hold metformin   - IVF, recheck lactate in AM

## 2021-07-03 NOTE — H&P ADULT - PROBLEM SELECTOR PROBLEM 4
DM (diabetes mellitus) Type 2 diabetes mellitus with hyperglycemia, without long-term current use of insulin

## 2021-07-03 NOTE — ED PROVIDER NOTE - CARDIAC, MLM
Normal rate, regular rhythm.  Heart sounds S1, S2.  No murmurs, rubs or gallops. Reproducible, midsternal/left sided chest pain.

## 2021-07-03 NOTE — H&P ADULT - ATTENDING COMMENTS
60F with hx of sarcoidosis on prednisone, HTN, HLD, T2DM (on metformin), who presents with chest pain and lightheadedness, found to be hyperglycemic without DKA criteria. VSS, labs reviewed, notable for anion gap 17, glucose 409, A1C 10.9%, BHB negative, pH 7.31 with lactic acid of 6.7. Troponin negative.     #Lightheadedness  - acute on chronic issue, occurs with changes in position and at rest, evaluated but neurology Dr. Lucas Patel outpatient, thought to be related to hyperventilation  - prior imaging in 2019 unremarkable, will get MR brain   - orthostatics negative, PT eval     #Chest Pain   - atypical chest pain, improved since admission, chronic issue has had cardiac work up outpatient with Dr. Olivera (stress echo unremarkable), pending CT coronaries 7/24  - troponin <6, monitor on telemetry, obtain further collateral from cardiologist Dr. Olivera, likely can complete remainder work up outpatient     #T2DM  - A1C 10% with hyperglycemia, BHB negative, does not meet DKA criteria  - start basal/bolus regimen, FS/SSI qac and hs, endocrine consulted  - elevated lactate of 6, no s/s of focal infection, BPs wnl, likely in setting of metformin, continue to monitor 60F with hx of sarcoidosis on prednisone, HTN, HLD, T2DM (on metformin), who presents with chest pain and lightheadedness, found to be hyperglycemic without DKA criteria. VSS, labs reviewed, notable for anion gap 17, glucose 409, A1C 10.9%, BHB negative, pH 7.31 with lactic acid of 6.7. Troponin negative.     #Lightheadedness  - acute on chronic issue, occurs with changes in position and at rest, evaluated but neurology Dr. Lucas Patel outpatient, thought to be related to hyperventilation  - prior imaging in 2019 unremarkable, will get MR brain   - orthostatics negative, PT eval     #Chest Pain   - atypical chest pain, improved since admission, chronic issue has had cardiac work up outpatient with Dr. Olievra (stress echo unremarkable), pending CT coronaries 7/24  - troponin <6, monitor on telemetry, obtain further collateral from cardiologist Dr. Olivera, likely can complete remainder work up outpatient     #T2DM  - A1C 10% with hyperglycemia, BHB negative, does not meet DKA criteria  - start basal/bolus regimen, FS/SSI qac and hs, endocrine consulted  - elevated lactate of 6, no s/s of focal infection, BPs wnl, likely i/s/o metformin, continue to monitor 60F with hx of sarcoidosis on prednisone, HTN, HLD, T2DM (on metformin), who presents with chest pain and lightheadedness, found to be hyperglycemic without DKA criteria. VSS, labs reviewed, notable for anion gap 17, glucose 409, A1C 10.9%, BHB negative, pH 7.31 with lactic acid of 6.7. Troponin negative.     #Lightheadedness  - acute on chronic issue, occurs with changes in position and at rest, evaluated but neurology Dr. Lucas Patel outpatient, thought to be related to hyperventilation  - prior imaging in 2019 unremarkable, will get MR brain   - orthostatics negative, PT eval     #Chest Pain   - atypical chest pain, improved since admission, chronic issue has had cardiac work up outpatient with Dr. Olivera (stress echo unremarkable), pending CT coronaries 7/24  - troponin <6, monitor on telemetry, obtain further collateral from cardiologist Dr. Olivera, likely can complete remainder work up outpatient     #T2DM  - A1C 10% with hyperglycemia, BHB negative, does not meet DKA criteria  - start basal/bolus regimen, FS/SSI qac and hs, endocrine consulted  - lactate of 6, no s/s of focal infection, BPs wnl, likely i/s/o metformin, continue to monitor 60F with hx of sarcoidosis on prednisone, HTN, HLD, T2DM (on metformin), who presents with chest pain and lightheadedness, found to be hyperglycemic without DKA criteria. VSS, labs reviewed, notable for anion gap 17, glucose 409, A1C 10.9%, BHB negative, pH 7.31 with lactic acid of 6.7. Troponin negative.     #Lightheadedness  - acute on chronic issue, occurs with changes in position and at rest, evaluated but neurology Dr. Lucas Patel outpatient, thought to be related to hyperventilation  - prior imaging in 2019 unremarkable, will get MR brain   - orthostatics negative, PT eval     #Chest Pain   - atypical chest pain, improved since admission, chronic issue has had cardiac work up outpatient with Dr. Olivera (stress echo unremarkable), pending CT coronaries 7/24  - troponin <6, monitor on telemetry, obtain further collateral from cardiologist Dr. Olivera, likely can complete remainder work up outpatient     #T2DM  - A1C 10% with hyperglycemia, BHB negative, does not meet DKA criteria  - endocrine recommending to keep NPO until next FS, basal/bolus regimen pending FS/PO status  - lactate of 6, no s/s of focal infection, BPs wnl, likely i/s/o metformin, continue to monitor 60F with hx of sarcoidosis on prednisone, HTN, HLD, T2DM (on metformin), who presents with chest pain and lightheadedness, found to be hyperglycemic without DKA criteria. VSS, labs reviewed, notable for anion gap 17, glucose 409, A1C 10.9%, BHB negative, pH 7.31 with lactic acid of 6.7. Troponin negative.     #Lightheadedness  - acute on chronic issue, occurs with changes in position and at rest, evaluated but neurology Dr. Lucas Patel outpatient, thought to be related to hyperventilation  - prior imaging in 2019 unremarkable, will get MR brain   - orthostatics negative, PT eval     #Chest Pain   - atypical chest pain, improved since admission, chronic issue has had cardiac work up outpatient with Dr. Olivera (stress echo unremarkable), pending CT coronaries 7/24  - troponin <6, monitor on telemetry, obtain further collateral from cardiologist Dr. Olivera, likely can complete remainder work up outpatient     #T2DM  - A1C 10% with hyperglycemia, BHB negative, does not meet DKA criteria  - endocrine consulted - started on basal/bolus regimen, adjust further per endo recs   - lactate of 6, no s/s of focal infection, BPs wnl, likely i/s/o metformin, continue to monitor 60F with hx of sarcoidosis on prednisone, HTN, HLD, T2DM (on metformin), who presents with chest pain and lightheadedness, found to be hyperglycemic without DKA criteria. VSS, labs reviewed, notable for anion gap 17, glucose 409, A1C 10.9%, BHB negative, pH 7.31 with lactic acid of 6.7. Troponin negative.     #Lightheadedness  - acute on chronic issue, occurs with changes in position and at rest, evaluated but neurology Dr. Lucas Patel outpatient, thought to be related to hyperventilation  - prior imaging in 2019 unremarkable, will get MR brain   - orthostatics negative, PT eval     #Chest Pain   - atypical chest pain, improved since admission, chronic issue has had cardiac work up outpatient with Dr. Olivera (stress echo unremarkable), pending CT coronaries 7/24  - troponin <6, monitor on telemetry, obtain further collateral from cardiologist Dr. Olivera, likely can complete remainder work up outpatient     #T2DM  - A1C 10% with hyperglycemia, BHB negative, does not meet DKA criteria  - d/w endocrine - start Lantus 15U at bedtime, Admelog 5U TID qac, moderate SSI qac and hs  - lactate of 6, no s/s of focal infection, BPs wnl, likely i/s/o metformin, continue to monitor

## 2021-07-03 NOTE — ED PROVIDER NOTE - OBJECTIVE STATEMENT
60y Female with PMHx of DM, Sarcoidosis currently on prednisone presents to the ER for lightheadedness. Patient states she has had intermittent episodes of lightheadedness associated with position changes. Patient was lying down, got up to do work and had an episode of lightheadedness, nausea and sweating. Denies syncope or LOC. Lightheadedness has since improved. In addition, patient reports pressure like mid/left sided chest pain that began last night with radiation to left jaw. Patient had multiple episodes today associated with shortness of breath and excessive sweating. Patient reports some improvement of chest pain now but is still having pain. Gloria aspirin. Denies fever, chills, nausea, vomiting, diarrhea, abdominal pain or urinary complaints. Of note, patient reports having a stress test and echo a few months ago (unsure of results) and is pending a CT scan of her heart due to insurance issues.

## 2021-07-03 NOTE — ED PROVIDER NOTE - PROGRESS NOTE DETAILS
ESVIN Bradshaw: Cliff TAMAYO reviewed - patient pending CT coronaries scheduled for 7/24. Do not see results from stress test or echocardiogram. Chest pain first mentioned in 02/2021 and lightheadedness in 04/2021. Patient seen by neuro who indicated lightheadedness may be related to hyperventilation. quan (tox attending): consider gabby given on metformin 1000 BID, subtle prerenal mary lou with bun15/cr0.5; ratio is 30:1. no other source for acidosis, not infected, please hold metformin and trend vbg's. if worsening acidemia develops, please page the tox fellow at [phone] or call my cell phone at [phone]

## 2021-07-03 NOTE — H&P ADULT - PROBLEM SELECTOR PLAN 5
- f/u with Dr Barnes as outpatient  - takes prednisone 15 mg daily at home (was supposed to take 30 mg daily)   - continue prednisone 15 mg for now - blood pressure stable  - resume home losartan

## 2021-07-03 NOTE — ED PROVIDER NOTE - NS ED ROS FT
Constitutional: (-) Fever, (-) Chills  Skin: (-) Rashes  Eyes: (-) Visual changes  Ears: (-) Hearing loss  Nose: (-) Nasal congestion, (-) Runny nose  Mouth/Throat: (-) Sore throat  CV: (+) Chest pain, (+) Diaphoresis, (-) Palpitations, (-) Extremity Swelling, (-) Syncope  Resp: (-) Cough, (-) Shortness of breath, (-) Dyspnea on Exertion, (-) Wheezing  GI: (-) Abdominal pain, (-) Nausea, (-) Vomiting, (-) Diarrhea  : (-) Dysuria  MSK: (-) Weakness, (-) Myalgias  Neuro: (-) Headache

## 2021-07-03 NOTE — H&P ADULT - PROBLEM SELECTOR PLAN 4
- uncontrolled DM  - prednisone use contributing to elevated FS  - a1c 10.9  - hold home metformin  - endocrine consulted   - start lantus and sliding scale - uncontrolled DM  - prednisone use contributing to elevated FS  - a1c 10.9  - hold home metformin  - endocrine consulted   - started on lantus and sliding scale

## 2021-07-03 NOTE — ED PROVIDER NOTE - ATTENDING CONTRIBUTION TO CARE
agree with above, in addition patient does not appear to have a stress test documented in [facility], first trop <6; I do not think this is a pe or a dissection. pt states the pain is different than her sarcoid pain, is non exertional, plan for cdu stress test tomorrow (confirmed availability with Dr. Dia).

## 2021-07-03 NOTE — PHARMACOTHERAPY INTERVENTION NOTE - COMMENTS
Medication history is complete. Medication list updated in Outpatient Medication Record (OMR). Medication history obtained from patient at bedside and confirmed with daughter. Please call spectra [phone] if you have any questions.

## 2021-07-03 NOTE — ED ADULT TRIAGE NOTE - CHIEF COMPLAINT QUOTE
Patient c/o feeling light headed h/o sarcoidosis x 2 years.  Patient has been feeling light headed on and off  " for a few months". Patient also has chest pian on and off " for a few months too".  FS  = 331

## 2021-07-03 NOTE — ED ADULT NURSE NOTE - NSFALLRSKUNASSIST_ED_ALL_ED
Department of Anesthesiology  Postprocedure Note    Patient: Sivakumar Ortega  MRN: [id number]  YOB: 1934  Date of evaluation: 12/5/2018  Time:  6:23 AM     Procedure Summary     Date:  12/04/18 Room / Location:  [address] / [facility]    Anesthesia Start:  1414 Anesthesia Stop:  9352    Procedure:  PERMANENT INTERSTIM (N/A Back) Diagnosis:  (MIXED INCONTINENCE)    Surgeon:  Chica De Dios MD Responsible Provider:  Lilibeth Peoples MD    Anesthesia Type:  general ASA Status:  3          Anesthesia Type: general    Seema Phase I: Seema Score: 10    Seema Phase II: Seema Score: 10    Last vitals: Reviewed and per EMR flowsheets.        Anesthesia Post Evaluation    Patient location during evaluation: PACU  Patient participation: complete - patient participated  Level of consciousness: awake and alert  Airway patency: patent  Nausea & Vomiting: no nausea  Complications: no  Cardiovascular status: blood pressure returned to baseline and hemodynamically stable  Respiratory status: acceptable and spontaneous ventilation  Hydration status: euvolemic
no

## 2021-07-03 NOTE — ED ADULT NURSE NOTE - OBJECTIVE STATEMENT
Pt arriving to room 28 A&OX4 ambulatory c/o intermittent light headedness starting this AM. PMH sarcoidosis, DM on metformin. Pt endorsing right jaw pain radiating to right chest. Resp even and unlabored. NSR on monitor. No neuro deficits noted. No pallor/diaphoresis noted. Pt denies LOC, SOB, N/V, abdominal pain, vision changes. 20g IV placed in RAC. Labs drawn and sent. Medicated as per EMAR. EKG obtained.

## 2021-07-03 NOTE — CHART NOTE - NSCHARTNOTEFT_GEN_A_CORE
Spoke with endocrine.  Patient can eat.   start Lantus 15 units HS  start admelog 5 units pre meals + moderate sliding scale AC + HS  recheck BMP tonight.       Evelyn Sarabia NP  pager [phone]

## 2021-07-03 NOTE — H&P ADULT - NSHPSOCIALHISTORY_GEN_ALL_CORE
Family history:  Mother and Father - DM    Denies ETOH use/abuse.   She never smoked.  Denies illicit drug use.

## 2021-07-03 NOTE — H&P ADULT - ASSESSMENT
60 year old female with PMH of DM, kidney stones and sarcoidosis on prednisone is here for lightheadedness. She was found to have lactic acidosis requiring further care and evaluation.  60F with hx of sarcoidosis on prednisone, HTN, HLD, T2DM (on metformin), who presents with chest pain and lightheadedness, found to be hyperglycemic without DKA criteria. Also with elevated lactic acid likely from metformin use.

## 2021-07-03 NOTE — H&P ADULT - PROBLEM SELECTOR PLAN 2
- denies personal and family history of heart disease  - troponin <6 x1   - she was unable to pursue cardiac work up as outpatient due to insurance issue  - check TTE - denies personal and family history of heart disease  - troponin <6 x1   - she was unable to pursue cardiac work up as outpatient due to insurance issue  - check TTE? - denies personal and family history of heart disease  - atypical chest pain   - troponin <6 x1   - she was unable to pursue cardiac work up as outpatient due to insurance issue  - please call outpatient cardiologist for collateral - atypical chest pain, denies personal and family history of heart disease  - chronic issue has had cardiac work up outpatient with Dr. Olivera (stress echo unremarkable), pending CT coronaries 7/24  - troponin <6 x1, please call outpatient cardiologist for collateral  - monitor on telemetry

## 2021-07-03 NOTE — H&P ADULT - PROBLEM SELECTOR PLAN 1
- lactate 5.6-->6.7  - likely due to metformin use  - hold metformin   - IVF  - recheck lactate tonight - lactate 5.6-->6.7  - likely due to metformin use  - hold metformin   - IVF  - recheck lactate in AM - dizziness/lightheaded related to movement, sometimes occurs at rest   - seen by neurologist Dr. Lucas Patel outpatient, thought to be related to hyperventilation  - orthostatic VS negative   - US head/neck on 06/29/2021 normal   - check MRI brain non con

## 2021-07-03 NOTE — CHART NOTE - NSCHARTNOTEFT_GEN_A_CORE
60F with hx of sarcoidosis on prednisone 15 mg daily, HTN, HLD, T2DM (A1c 10.9 on metformin), who presents with chest pain and lightheadedness, found to be hyperglycemic without DKA criteria. Also with elevated lactic acid likely from metformin use.      at noon, improved to 294 with IVF.     Recommend Lantus 15 units qhs and admelog 5 units TID pre-meal. Moderate pre-meal and moderate bedtime correction scales for now as on steroids.     DW Team    Keli Jacob DO, Endocrine Fellow   Pager [phone] from 9-5PM. After hours and on weekends please call [phone].

## 2021-07-03 NOTE — H&P ADULT - PROBLEM SELECTOR PLAN 6
- lovenox for ppx - f/u with Dr Barnes as outpatient  - takes prednisone 15 mg daily at home (was supposed to take 30 mg daily)   - continue prednisone 15 mg for now

## 2021-07-04 DIAGNOSIS — E78.5 HYPERLIPIDEMIA, UNSPECIFIED: ICD-10-CM

## 2021-07-04 DIAGNOSIS — I10 ESSENTIAL (PRIMARY) HYPERTENSION: ICD-10-CM

## 2021-07-04 LAB
ANION GAP SERPL CALC-SCNC: 11 MMOL/L — SIGNIFICANT CHANGE UP (ref 7–14)
BASE EXCESS BLDV CALC-SCNC: 2 MMOL/L — SIGNIFICANT CHANGE UP (ref -3–2)
BASOPHILS # BLD AUTO: 0.02 K/UL — SIGNIFICANT CHANGE UP (ref 0–0.2)
BASOPHILS NFR BLD AUTO: 0.4 % — SIGNIFICANT CHANGE UP (ref 0–2)
BLOOD GAS VENOUS - CREATININE: SIGNIFICANT CHANGE UP MG/DL (ref 0.5–1.3)
BLOOD GAS VENOUS COMPREHENSIVE RESULT: SIGNIFICANT CHANGE UP
BUN SERPL-MCNC: 10 MG/DL — SIGNIFICANT CHANGE UP (ref 7–23)
CALCIUM SERPL-MCNC: 9.4 MG/DL — SIGNIFICANT CHANGE UP (ref 8.4–10.5)
CHLORIDE BLDV-SCNC: 105 MMOL/L — SIGNIFICANT CHANGE UP (ref 96–108)
CHLORIDE SERPL-SCNC: 100 MMOL/L — SIGNIFICANT CHANGE UP (ref 98–107)
CHOLEST SERPL-MCNC: 174 MG/DL — SIGNIFICANT CHANGE UP
CO2 SERPL-SCNC: 23 MMOL/L — SIGNIFICANT CHANGE UP (ref 22–31)
COVID-19 SPIKE DOMAIN AB INTERP: POSITIVE
COVID-19 SPIKE DOMAIN ANTIBODY RESULT: 200 U/ML — HIGH
CREAT SERPL-MCNC: 0.58 MG/DL — SIGNIFICANT CHANGE UP (ref 0.5–1.3)
EOSINOPHIL # BLD AUTO: 0.12 K/UL — SIGNIFICANT CHANGE UP (ref 0–0.5)
EOSINOPHIL NFR BLD AUTO: 2.2 % — SIGNIFICANT CHANGE UP (ref 0–6)
GAS PNL BLDV: 138 MMOL/L — SIGNIFICANT CHANGE UP (ref 136–146)
GLUCOSE BLDC GLUCOMTR-MCNC: 215 MG/DL — HIGH (ref 70–99)
GLUCOSE BLDC GLUCOMTR-MCNC: 268 MG/DL — HIGH (ref 70–99)
GLUCOSE BLDC GLUCOMTR-MCNC: 278 MG/DL — HIGH (ref 70–99)
GLUCOSE BLDC GLUCOMTR-MCNC: 283 MG/DL — HIGH (ref 70–99)
GLUCOSE BLDV-MCNC: 186 MG/DL — HIGH (ref 70–99)
GLUCOSE SERPL-MCNC: 187 MG/DL — HIGH (ref 70–99)
HCO3 BLDV-SCNC: 26 MMOL/L — SIGNIFICANT CHANGE UP (ref 20–27)
HCT VFR BLD CALC: 38.8 % — SIGNIFICANT CHANGE UP (ref 34.5–45)
HCT VFR BLDA CALC: 40.2 % — SIGNIFICANT CHANGE UP (ref 34.5–46.5)
HCV AB S/CO SERPL IA: 0.07 S/CO — SIGNIFICANT CHANGE UP (ref 0–0.99)
HCV AB SERPL-IMP: SIGNIFICANT CHANGE UP
HDLC SERPL-MCNC: 67 MG/DL — SIGNIFICANT CHANGE UP
HGB BLD CALC-MCNC: 13.1 G/DL — SIGNIFICANT CHANGE UP (ref 11.5–15.5)
HGB BLD-MCNC: 12.8 G/DL — SIGNIFICANT CHANGE UP (ref 11.5–15.5)
IANC: 2.83 K/UL — SIGNIFICANT CHANGE UP (ref 1.5–8.5)
IMM GRANULOCYTES NFR BLD AUTO: 0.9 % — SIGNIFICANT CHANGE UP (ref 0–1.5)
LACTATE BLDV-MCNC: 2.1 MMOL/L — HIGH (ref 0.5–2)
LIPID PNL WITH DIRECT LDL SERPL: 63 MG/DL — SIGNIFICANT CHANGE UP
LYMPHOCYTES # BLD AUTO: 1.78 K/UL — SIGNIFICANT CHANGE UP (ref 1–3.3)
LYMPHOCYTES # BLD AUTO: 33.1 % — SIGNIFICANT CHANGE UP (ref 13–44)
MAGNESIUM SERPL-MCNC: 1.9 MG/DL — SIGNIFICANT CHANGE UP (ref 1.6–2.6)
MCHC RBC-ENTMCNC: 31 PG — SIGNIFICANT CHANGE UP (ref 27–34)
MCHC RBC-ENTMCNC: 33 GM/DL — SIGNIFICANT CHANGE UP (ref 32–36)
MCV RBC AUTO: 93.9 FL — SIGNIFICANT CHANGE UP (ref 80–100)
MONOCYTES # BLD AUTO: 0.58 K/UL — SIGNIFICANT CHANGE UP (ref 0–0.9)
MONOCYTES NFR BLD AUTO: 10.8 % — SIGNIFICANT CHANGE UP (ref 2–14)
NEUTROPHILS # BLD AUTO: 2.83 K/UL — SIGNIFICANT CHANGE UP (ref 1.8–7.4)
NEUTROPHILS NFR BLD AUTO: 52.6 % — SIGNIFICANT CHANGE UP (ref 43–77)
NON HDL CHOLESTEROL: 107 MG/DL — SIGNIFICANT CHANGE UP
NRBC # BLD: 0 /100 WBCS — SIGNIFICANT CHANGE UP
NRBC # FLD: 0 K/UL — SIGNIFICANT CHANGE UP
PCO2 BLDV: 46 MMHG — SIGNIFICANT CHANGE UP (ref 41–51)
PH BLDV: 7.38 — SIGNIFICANT CHANGE UP (ref 7.32–7.43)
PHOSPHATE SERPL-MCNC: 3.6 MG/DL — SIGNIFICANT CHANGE UP (ref 2.5–4.5)
PLATELET # BLD AUTO: 200 K/UL — SIGNIFICANT CHANGE UP (ref 150–400)
PO2 BLDV: 51 MMHG — HIGH (ref 35–40)
POTASSIUM BLDV-SCNC: 3.8 MMOL/L — SIGNIFICANT CHANGE UP (ref 3.4–4.5)
POTASSIUM SERPL-MCNC: 4.5 MMOL/L — SIGNIFICANT CHANGE UP (ref 3.5–5.3)
POTASSIUM SERPL-SCNC: 4.5 MMOL/L — SIGNIFICANT CHANGE UP (ref 3.5–5.3)
RBC # BLD: 4.13 M/UL — SIGNIFICANT CHANGE UP (ref 3.8–5.2)
RBC # FLD: 12.9 % — SIGNIFICANT CHANGE UP (ref 10.3–14.5)
SAO2 % BLDV: 84.3 % — SIGNIFICANT CHANGE UP (ref 60–85)
SARS-COV-2 IGG+IGM SERPL QL IA: 200 U/ML — HIGH
SARS-COV-2 IGG+IGM SERPL QL IA: POSITIVE
SODIUM SERPL-SCNC: 134 MMOL/L — LOW (ref 135–145)
TRIGL SERPL-MCNC: 220 MG/DL — HIGH
TSH SERPL-MCNC: 0.9 UIU/ML — SIGNIFICANT CHANGE UP (ref 0.27–4.2)
WBC # BLD: 5.38 K/UL — SIGNIFICANT CHANGE UP (ref 3.8–10.5)
WBC # FLD AUTO: 5.38 K/UL — SIGNIFICANT CHANGE UP (ref 3.8–10.5)

## 2021-07-04 PROCEDURE — 99222 1ST HOSP IP/OBS MODERATE 55: CPT

## 2021-07-04 PROCEDURE — 99232 SBSQ HOSP IP/OBS MODERATE 35: CPT

## 2021-07-04 RX ORDER — INSULIN LISPRO 100/ML
7 VIAL (ML) SUBCUTANEOUS
Refills: 0 | Status: DISCONTINUED | OUTPATIENT
Start: 2021-07-04 | End: 2021-07-04

## 2021-07-04 RX ORDER — INSULIN GLARGINE 100 [IU]/ML
22 INJECTION, SOLUTION SUBCUTANEOUS AT BEDTIME
Refills: 0 | Status: DISCONTINUED | OUTPATIENT
Start: 2021-07-04 | End: 2021-07-06

## 2021-07-04 RX ORDER — INSULIN LISPRO 100/ML
8 VIAL (ML) SUBCUTANEOUS
Refills: 0 | Status: DISCONTINUED | OUTPATIENT
Start: 2021-07-04 | End: 2021-07-05

## 2021-07-04 RX ADMIN — Medication 7 UNIT(S): at 13:26

## 2021-07-04 RX ADMIN — INSULIN GLARGINE 22 UNIT(S): 100 INJECTION, SOLUTION SUBCUTANEOUS at 21:53

## 2021-07-04 RX ADMIN — Medication 7 UNIT(S): at 18:18

## 2021-07-04 RX ADMIN — SODIUM CHLORIDE 75 MILLILITER(S): 9 INJECTION INTRAMUSCULAR; INTRAVENOUS; SUBCUTANEOUS at 09:14

## 2021-07-04 RX ADMIN — LOSARTAN POTASSIUM 25 MILLIGRAM(S): 100 TABLET, FILM COATED ORAL at 05:15

## 2021-07-04 RX ADMIN — ATORVASTATIN CALCIUM 10 MILLIGRAM(S): 80 TABLET, FILM COATED ORAL at 21:53

## 2021-07-04 RX ADMIN — Medication 15 MILLIGRAM(S): at 05:15

## 2021-07-04 RX ADMIN — ENOXAPARIN SODIUM 40 MILLIGRAM(S): 100 INJECTION SUBCUTANEOUS at 13:26

## 2021-07-04 RX ADMIN — Medication 2: at 21:53

## 2021-07-04 RX ADMIN — Medication 6: at 09:15

## 2021-07-04 RX ADMIN — Medication 6: at 13:25

## 2021-07-04 RX ADMIN — Medication 4: at 18:17

## 2021-07-04 RX ADMIN — Medication 5 UNIT(S): at 09:14

## 2021-07-04 NOTE — CONSULT NOTE ADULT - SUBJECTIVE AND OBJECTIVE BOX
HPI:  60 year old female T2DM (A1C 10.9) on metformin c/b mild neuropathy. Also with hx of sarcoidosis on steroids. P/w c chest pain and dizziness/lightheadedness.    Endocrine history  T2DM dx ~ 4 years ago, follows with PMD. Complicated by mild neuropathy. Denies other macro/micro vascular complications. Past due for Optho f/u. Takes metformin 1,000 mg BID for DM. Denies taking extra doses of metformin or missing doses. Started on prednisone about 1 year ago for sarcoidosis with 40 mg daily but was taken off when symptoms improved. Now restarted on prednisone ~ two months ago for pain due to sarcoidosis. Was taking prednisone 20 mg daily, decreased to 15 mg daily ~ 1 month ago. Checks FS a few time a week usually fasting and levels range mid to high 200s, sometimes in 300s. Does not moderate carbs. Currently feels well. Denies nausea, vomiting, abdominal pain.    PAST MEDICAL & SURGICAL HISTORY:  Kidney stones    DM (diabetes mellitus)    Sarcoidosis    H/O nephrolithotomy with removal of calculi    Surgery, elective  otolaryngologic        FAMILY HISTORY:   T2DM in parents    Social History:   Denies tobacco use or alcohol abuse      Outpatient Medications: Home Medications:  atorvastatin 10 mg oral tablet: 1 tab(s) orally once a day (03 Jul 2021 16:18)  losartan 25 mg oral tablet: 1 tab(s) orally once a day (03 Jul 2021 16:18)  metFORMIN 1000 mg oral tablet: 1 tab(s) orally 2 times a day (03 Jul 2021 16:18)  predniSONE 5 mg oral tablet: 3 tab(s) (15mg) orally once a day (Patient notes that she is prescribed 30mg, however, only takes 15mg) (03 Jul 2021 16:18)      MEDICATIONS  (STANDING):  atorvastatin 10 milliGRAM(s) Oral at bedtime  dextrose 40% Gel 15 Gram(s) Oral once  dextrose 50% Injectable 25 Gram(s) IV Push once  enoxaparin Injectable 40 milliGRAM(s) SubCutaneous daily  insulin glargine Injectable (LANTUS) 15 Unit(s) SubCutaneous at bedtime  insulin lispro (ADMELOG) corrective regimen sliding scale   SubCutaneous three times a day before meals  insulin lispro (ADMELOG) corrective regimen sliding scale   SubCutaneous at bedtime  insulin lispro Injectable (ADMELOG) 7 Unit(s) SubCutaneous three times a day before meals  losartan 25 milliGRAM(s) Oral daily  predniSONE   Tablet 15 milliGRAM(s) Oral daily  sodium chloride 0.9%. 1000 milliLiter(s) (75 mL/Hr) IV Continuous <Continuous>    MEDICATIONS  (PRN):  acetaminophen   Tablet .. 650 milliGRAM(s) Oral every 6 hours PRN Temp greater or equal to 38C (100.4F), Mild Pain (1 - 3)      Allergies    No Known Allergies    Intolerances      Review of Systems:  Constitutional: No fever, chills   Neuro: No tremors, headache   Cardiovascular: No chest pain, palpitations  Respiratory: No SOB, no cough  GI: No nausea, vomiting, abdominal pain  : No dysuria, polyuria   Skin: no rash, ulcers   Psych: no depression, anxiety   Endocrine: no polyphagia, polydipsia     ALL OTHER SYSTEMS REVIEWED AND NEGATIVE        PHYSICAL EXAM:  VITALS: T(C): 36.8 (07-04-21 @ 13:24)  T(F): 98.2 (07-04-21 @ 13:24), Max: 98.3 (07-03-21 @ 15:55)  HR: 94 (07-04-21 @ 13:24) (69 - 94)  BP: 115/73 (07-04-21 @ 13:24) (115/73 - 150/95)  RR:  (16 - 18)  SpO2:  (96% - 100%)  Wt(kg): --  GENERAL: NAD, well-groomed, well-developed  EYES: No proptosis, anicteric  HEENT:  Atraumatic, Normocephalic, moist mucous membranes  THYROID: Normal size, no palpable nodules  RESPIRATORY: Clear to auscultation bilaterally; No rales, rhonchi, wheezing  CARDIOVASCULAR: Regular rate and rhythm; No murmurs  GI: Soft, nontender, non distended, normal bowel sounds  SKIN: Dry, intact, No rashes or lesions  NEURO: AOx3, moves all extremities spontaneously   PSYCH: Reactive affect, euthymic mood    POCT Blood Glucose.: 268 mg/dL (07-04-21 @ 13:14)  POCT Blood Glucose.: 278 mg/dL (07-04-21 @ 08:50)  POCT Blood Glucose.: 184 mg/dL (07-03-21 @ 22:30)  POCT Blood Glucose.: 193 mg/dL (07-03-21 @ 21:05)  POCT Blood Glucose.: 277 mg/dL (07-03-21 @ 18:53)  POCT Blood Glucose.: 294 mg/dL (07-03-21 @ 17:26)  POCT Blood Glucose.: 331 mg/dL (07-03-21 @ 11:38)                            12.8   5.38  )-----------( 200      ( 04 Jul 2021 06:23 )             38.8       07-04    134<L>  |  100  |  10  ----------------------------<  187<H>  4.5   |  23  |  0.58    EGFR if : 114  EGFR if non : 99    Ca    9.4      07-04  Mg     1.90     07-04  Phos  3.6     07-04    TPro  6.6  /  Alb  4.0  /  TBili  0.6  /  DBili  x   /  AST  20  /  ALT  31  /  AlkPhos  99  07-03      Thyroid Function Tests:  07-04 @ 06:23 TSH 0.90 FreeT4 -- T3 -- Anti TPO -- Anti Thyroglobulin Ab -- TSI --      07-04 Chol 174 Direct LDL -- LDL calculated 63 HDL 67 Trig 220<H>        Radiology:

## 2021-07-04 NOTE — CONSULT NOTE ADULT - ASSESSMENT
60 year old female T2DM (A1C 10.9) on metformin c/b mild neuropathy. Also with hx of sarcoidosis on steroids. P/w c chest pain and dizziness/lightheadedness. Endocrine consulted for T2DM with hyperglycemia     #uncontrolled T2DM with hyperglycemia  - goal glucose 100-180  - increas Lantus 22 units qhs  - adjusted Admelog to 7 units for lunch - if dinner FS at goal, can monitor on admelog 7 units TID pre-meal, if above goal, increase to 8 units  - c/w moderate pre-meal correction scale and moderate HS correction scale  - check FS AC/HS  - carb consistent diet  - registered dietician ubaldo  - will need insulin teaching prior to DC  Discharge  - STOP metformin given lactic acidosis. Plan to DC on basal/bolus vs basal plus oral agent such as DPP4i (ex: Januvia 100 mg daily, Tradjenta 5 mg daily)  - will need insulin teaching prior to DC  - will need RX for insulin PEN per insurance and BD MONSE PEN NEEDLES    #Lactic acidosis likely due to metformin  - hold metformin inpatient and on discharge  - LA improving off metformin  - cont to trend lactate until normalized    #HTN  goal BP<130/80 (controlled)  continue home ARB    #HLD  continue statin     DW Team    Keli Jacob DO, Endocrine Fellow   Pager [phone] from 9-5PM. After hours and on weekends please call [phone].     60 year old female T2DM (A1C 10.9) on metformin c/b mild neuropathy. Also with hx of sarcoidosis on steroids. P/w c chest pain and dizziness/lightheadedness. Endocrine consulted for T2DM with hyperglycemia     #uncontrolled T2DM with hyperglycemia  - goal glucose 100-180  - increas Lantus 22 units qhs  - adjusted Admelog to 7 units for lunch - if dinner FS at goal, can monitor on admelog 7 units TID pre-meal, if above goal, increase to 8 units  - c/w moderate pre-meal correction scale and moderate HS correction scale  - check FS AC/HS  - carb consistent diet  - registered dietician ubaldo  - will need insulin teaching prior to DC  Discharge  - STOP metformin given lactic acidosis. Plan to DC on basal/bolus vs basal plus oral agent such as DPP4i (ex: Januvia 100 mg daily, Tradjenta 5 mg daily)  - will need insulin teaching prior to DC  - will need RX for insulin PEN per insurance and BD MONSE PEN NEEDLES  - [facility] f[facility]u @ [facility]  [address] [place] [address], [address], [place] [address]  [phone]      #Lactic acidosis likely due to metformin  - hold metformin inpatient and on discharge  - LA improving off metformin  - cont to trend lactate until normalized    #HTN  goal BP<130/80 (controlled)  continue home ARB    #HLD  continue statin     DW Team    Keli Jacob DO, Endocrine Fellow   Pager [phone] from 9-5PM. After hours and on weekends please call [phone].

## 2021-07-04 NOTE — PROGRESS NOTE ADULT - PROBLEM SELECTOR PLAN 3
- lactate 5.6-->6.7  - likely due to metformin use  - hold metformin   - IVF, lactate downtrending 6.7-->2.1

## 2021-07-04 NOTE — PHYSICAL THERAPY INITIAL EVALUATION ADULT - PERTINENT HX OF CURRENT PROBLEM, REHAB EVAL
Patient is 60 year old female admitted with history of DM, kidney stone, sarcoidosis prednisone, presents with lightheadedness.

## 2021-07-04 NOTE — PROGRESS NOTE ADULT - PROBLEM SELECTOR PLAN 1
- dizziness/lightheaded related to movement, sometimes occurs at rest   - seen by neurologist Dr. Lucas Patel outpatient, thought to be related to hyperventilation  - orthostatic VS negative   - US head/neck on 06/29/2021 normal   - check MRI brain non con, although nonfocal symptoms, no finger to nose dysmetria or heel to shin, doubt central infarct

## 2021-07-04 NOTE — PROGRESS NOTE ADULT - PROBLEM SELECTOR PLAN 6
- f/u with Dr Barnes as outpatient  - takes prednisone 15 mg daily at home (was supposed to take 30 mg daily)   - continue prednisone 15 mg for now

## 2021-07-04 NOTE — PROGRESS NOTE ADULT - SUBJECTIVE AND OBJECTIVE BOX
Dr. Guillermina Lugo  Pager [phone]    PROGRESS NOTE:     Patient is a 60y old  Female who presents with a chief complaint of lightheaded (03 Jul 2021 16:12)      SUBJECTIVE / OVERNIGHT EVENTS: pt c/o dizziness, denies active chest pain/sob  ADDITIONAL REVIEW OF SYSTEMS: afebrile, reports recent echo/stress test negative as outpt    MEDICATIONS  (STANDING):  atorvastatin 10 milliGRAM(s) Oral at bedtime  dextrose 40% Gel 15 Gram(s) Oral once  dextrose 50% Injectable 25 Gram(s) IV Push once  enoxaparin Injectable 40 milliGRAM(s) SubCutaneous daily  insulin glargine Injectable (LANTUS) 15 Unit(s) SubCutaneous at bedtime  insulin lispro (ADMELOG) corrective regimen sliding scale   SubCutaneous three times a day before meals  insulin lispro (ADMELOG) corrective regimen sliding scale   SubCutaneous at bedtime  insulin lispro Injectable (ADMELOG) 7 Unit(s) SubCutaneous three times a day before meals  losartan 25 milliGRAM(s) Oral daily  predniSONE   Tablet 15 milliGRAM(s) Oral daily  sodium chloride 0.9%. 1000 milliLiter(s) (75 mL/Hr) IV Continuous <Continuous>    MEDICATIONS  (PRN):  acetaminophen   Tablet .. 650 milliGRAM(s) Oral every 6 hours PRN Temp greater or equal to 38C (100.4F), Mild Pain (1 - 3)      CAPILLARY BLOOD GLUCOSE      POCT Blood Glucose.: 268 mg/dL (04 Jul 2021 13:14)  POCT Blood Glucose.: 278 mg/dL (04 Jul 2021 08:50)  POCT Blood Glucose.: 184 mg/dL (03 Jul 2021 22:30)  POCT Blood Glucose.: 193 mg/dL (03 Jul 2021 21:05)  POCT Blood Glucose.: 277 mg/dL (03 Jul 2021 18:53)  POCT Blood Glucose.: 294 mg/dL (03 Jul 2021 17:26)    I&O's Summary      PHYSICAL EXAM:  Vital Signs Last 24 Hrs  T(C): 36.8 (04 Jul 2021 13:24), Max: 37.1 (03 Jul 2021 15:09)  T(F): 98.2 (04 Jul 2021 13:24), Max: 98.8 (03 Jul 2021 15:09)  HR: 94 (04 Jul 2021 13:24) (69 - 94)  BP: 115/73 (04 Jul 2021 13:24) (115/73 - 163/98)  BP(mean): --  RR: 18 (04 Jul 2021 13:24) (16 - 18)  SpO2: 96% (04 Jul 2021 13:24) (96% - 100%)  CONSTITUTIONAL: NAD, well-developed  RESPIRATORY: Normal respiratory effort; lungs are clear to auscultation bilaterally  CARDIOVASCULAR: Regular rate and rhythm, normal S1 and S2, no murmur/rub/gallop; No lower extremity edema; Peripheral pulses are 2+ bilaterally  ABDOMEN: Nontender to palpation, normoactive bowel sounds, no rebound/guarding; No hepatosplenomegaly  MUSCULOSKELETAL: no clubbing or cyanosis of digits; no joint swelling or tenderness to palpation  PSYCH: A+O to person, place, and time; affect appropriate    LABS:                        12.8   5.38  )-----------( 200      ( 04 Jul 2021 06:23 )             38.8     07-04    134<L>  |  100  |  10  ----------------------------<  187<H>  4.5   |  23  |  0.58    Ca    9.4      04 Jul 2021 06:23  Phos  3.6     07-04  Mg     1.90     07-04    TPro  6.6  /  Alb  4.0  /  TBili  0.6  /  DBili  x   /  AST  20  /  ALT  31  /  AlkPhos  99  07-03      RADIOLOGY & ADDITIONAL TESTS:  Results Reviewed:   Imaging Personally Reviewed:  < from: Xray Chest 2 Views PA/Lat (07.03.21 @ 13:53) >    IMPRESSION:  Clear lungs.    Electrocardiogram Personally Reviewed:    COORDINATION OF CARE:  Care Discussed with Consultants/Other Providers [Y/N]:  Prior or Outpatient Records Reviewed [Y/N]:

## 2021-07-04 NOTE — PROGRESS NOTE ADULT - ASSESSMENT
60F with hx of sarcoidosis on prednisone, HTN, HLD, T2DM (on metformin), who presents with chest pain and lightheadedness, found to be hyperglycemic without DKA criteria. Also with elevated lactic acid likely from metformin use.

## 2021-07-04 NOTE — PROGRESS NOTE ADULT - PROBLEM SELECTOR PLAN 4
- uncontrolled DM  - prednisone use contributing to elevated FS  - a1c 10.9  - hold home metformin  - endocrine consulted , started on basal bolus insulin lantus 15u hs and premeal 7u actid, ISS  -f/u endo recs

## 2021-07-04 NOTE — PROGRESS NOTE ADULT - PROBLEM SELECTOR PLAN 2
- atypical chest pain, denies personal and family history of heart disease  - chronic issue has had cardiac work up outpatient with Dr. Olivera (stress echo unremarkable), pending CT coronaries 7/24  - troponin <6 x1, please call outpatient cardiologist for collateral  - monitor on telemetry

## 2021-07-04 NOTE — CONSULT NOTE ADULT - ATTENDING COMMENTS
60 year old female T2DM (A1C 10.9) on metformin c/b mild neuropathy. Also with hx of sarcoidosis on steroids noted to have lactic acidosis.   At this time on basal/bolus insulin. Hold off on metformin.

## 2021-07-05 ENCOUNTER — TRANSCRIPTION ENCOUNTER (OUTPATIENT)
Age: 61
End: 2021-07-05

## 2021-07-05 LAB
ANION GAP SERPL CALC-SCNC: 14 MMOL/L — SIGNIFICANT CHANGE UP (ref 7–14)
BASOPHILS # BLD AUTO: 0.02 K/UL — SIGNIFICANT CHANGE UP (ref 0–0.2)
BASOPHILS NFR BLD AUTO: 0.4 % — SIGNIFICANT CHANGE UP (ref 0–2)
BUN SERPL-MCNC: 19 MG/DL — SIGNIFICANT CHANGE UP (ref 7–23)
CALCIUM SERPL-MCNC: 9.3 MG/DL — SIGNIFICANT CHANGE UP (ref 8.4–10.5)
CHLORIDE SERPL-SCNC: 102 MMOL/L — SIGNIFICANT CHANGE UP (ref 98–107)
CO2 SERPL-SCNC: 22 MMOL/L — SIGNIFICANT CHANGE UP (ref 22–31)
CREAT SERPL-MCNC: 0.58 MG/DL — SIGNIFICANT CHANGE UP (ref 0.5–1.3)
EOSINOPHIL # BLD AUTO: 0.1 K/UL — SIGNIFICANT CHANGE UP (ref 0–0.5)
EOSINOPHIL NFR BLD AUTO: 1.8 % — SIGNIFICANT CHANGE UP (ref 0–6)
GLUCOSE BLDC GLUCOMTR-MCNC: 177 MG/DL — HIGH (ref 70–99)
GLUCOSE BLDC GLUCOMTR-MCNC: 208 MG/DL — HIGH (ref 70–99)
GLUCOSE BLDC GLUCOMTR-MCNC: 270 MG/DL — HIGH (ref 70–99)
GLUCOSE BLDC GLUCOMTR-MCNC: 292 MG/DL — HIGH (ref 70–99)
GLUCOSE SERPL-MCNC: 197 MG/DL — HIGH (ref 70–99)
HCT VFR BLD CALC: 40.6 % — SIGNIFICANT CHANGE UP (ref 34.5–45)
HGB BLD-MCNC: 13.5 G/DL — SIGNIFICANT CHANGE UP (ref 11.5–15.5)
IANC: 3.07 K/UL — SIGNIFICANT CHANGE UP (ref 1.5–8.5)
IMM GRANULOCYTES NFR BLD AUTO: 0.5 % — SIGNIFICANT CHANGE UP (ref 0–1.5)
LYMPHOCYTES # BLD AUTO: 1.67 K/UL — SIGNIFICANT CHANGE UP (ref 1–3.3)
LYMPHOCYTES # BLD AUTO: 29.9 % — SIGNIFICANT CHANGE UP (ref 13–44)
MAGNESIUM SERPL-MCNC: 1.9 MG/DL — SIGNIFICANT CHANGE UP (ref 1.6–2.6)
MCHC RBC-ENTMCNC: 31 PG — SIGNIFICANT CHANGE UP (ref 27–34)
MCHC RBC-ENTMCNC: 33.3 GM/DL — SIGNIFICANT CHANGE UP (ref 32–36)
MCV RBC AUTO: 93.3 FL — SIGNIFICANT CHANGE UP (ref 80–100)
MONOCYTES # BLD AUTO: 0.7 K/UL — SIGNIFICANT CHANGE UP (ref 0–0.9)
MONOCYTES NFR BLD AUTO: 12.5 % — SIGNIFICANT CHANGE UP (ref 2–14)
NEUTROPHILS # BLD AUTO: 3.07 K/UL — SIGNIFICANT CHANGE UP (ref 1.8–7.4)
NEUTROPHILS NFR BLD AUTO: 54.9 % — SIGNIFICANT CHANGE UP (ref 43–77)
NRBC # BLD: 0 /100 WBCS — SIGNIFICANT CHANGE UP
NRBC # FLD: 0 K/UL — SIGNIFICANT CHANGE UP
PHOSPHATE SERPL-MCNC: 4.6 MG/DL — HIGH (ref 2.5–4.5)
PLATELET # BLD AUTO: 196 K/UL — SIGNIFICANT CHANGE UP (ref 150–400)
POTASSIUM SERPL-MCNC: 3.6 MMOL/L — SIGNIFICANT CHANGE UP (ref 3.5–5.3)
POTASSIUM SERPL-SCNC: 3.6 MMOL/L — SIGNIFICANT CHANGE UP (ref 3.5–5.3)
RBC # BLD: 4.35 M/UL — SIGNIFICANT CHANGE UP (ref 3.8–5.2)
RBC # FLD: 12.9 % — SIGNIFICANT CHANGE UP (ref 10.3–14.5)
SODIUM SERPL-SCNC: 138 MMOL/L — SIGNIFICANT CHANGE UP (ref 135–145)
WBC # BLD: 5.59 K/UL — SIGNIFICANT CHANGE UP (ref 3.8–10.5)
WBC # FLD AUTO: 5.59 K/UL — SIGNIFICANT CHANGE UP (ref 3.8–10.5)

## 2021-07-05 PROCEDURE — 99232 SBSQ HOSP IP/OBS MODERATE 35: CPT

## 2021-07-05 PROCEDURE — 70551 MRI BRAIN STEM W/O DYE: CPT | Mod: 26

## 2021-07-05 RX ORDER — INSULIN LISPRO 100/ML
10 VIAL (ML) SUBCUTANEOUS
Refills: 0 | Status: DISCONTINUED | OUTPATIENT
Start: 2021-07-05 | End: 2021-07-06

## 2021-07-05 RX ADMIN — Medication 15 MILLIGRAM(S): at 05:25

## 2021-07-05 RX ADMIN — Medication 6: at 09:11

## 2021-07-05 RX ADMIN — ATORVASTATIN CALCIUM 10 MILLIGRAM(S): 80 TABLET, FILM COATED ORAL at 21:30

## 2021-07-05 RX ADMIN — ENOXAPARIN SODIUM 40 MILLIGRAM(S): 100 INJECTION SUBCUTANEOUS at 11:55

## 2021-07-05 RX ADMIN — Medication 6: at 13:11

## 2021-07-05 RX ADMIN — LOSARTAN POTASSIUM 25 MILLIGRAM(S): 100 TABLET, FILM COATED ORAL at 05:25

## 2021-07-05 RX ADMIN — Medication 8 UNIT(S): at 13:11

## 2021-07-05 RX ADMIN — Medication 2: at 18:40

## 2021-07-05 RX ADMIN — Medication 8 UNIT(S): at 09:16

## 2021-07-05 RX ADMIN — INSULIN GLARGINE 22 UNIT(S): 100 INJECTION, SOLUTION SUBCUTANEOUS at 21:30

## 2021-07-05 RX ADMIN — Medication 10 UNIT(S): at 18:40

## 2021-07-05 NOTE — PROGRESS NOTE ADULT - ASSESSMENT
60 year old female T2DM (A1C 10.9) on metformin c/b mild neuropathy. Also with hx of sarcoidosis on steroids. P/w c chest pain and dizziness/lightheadedness. Endocrine consulted for T2DM with hyperglycemia     #uncontrolled T2DM with hyperglycemia  - goal glucose 100-180  - Pt with continued postprandial hyperglycemia, admelog dose was increased to 8 units before breakfast.  Monitor today,and plan to increase further if pre-lunch glucose is elevated.   - Bedtime glucose was 283 yesterday, with AM venous glucose 197, continue to monitor on Lantus 22 units qhs  - c/w moderate pre-meal correction scale and moderate HS correction scale  - carb consistent diet  - registered dietician ubaldo  - will need insulin teaching prior to DC  Discharge  - STOP metformin given lactic acidosis. Plan to DC on basal/bolus vs basal plus oral agent such as DPP4i (ex: Januvia 100 mg daily, Tradjenta 5 mg daily)  - will need RX for insulin PEN per insurance and BD MONSE PEN NEEDLES  - [facility]  [address] [place] [address], [address], [place] [address]  [phone]      #Lactic acidosis likely due to metformin  - hold metformin inpatient and on discharge  - LA improving off metformin  - cont to trend lactate until normalized    #HTN  goal BP<130/80 (controlled)  continue home ARB    #HLD  continue statin       Kendra Emerson MD  pager  on 7/5/21  Other times:  Diabetes team: [phone] business hours  [phone] night/weekend

## 2021-07-05 NOTE — DISCHARGE NOTE PROVIDER - HOSPITAL COURSE
60 year old female with PMH of DM, kidney stones and sarcoidosis on prednisone is here for lightheadedness. She has been having on/off chest pain accompanied by dizziness/lightheadedness for the past 2 months. Chest pain described as 10/10 severe groaning pain that starts from the back of her right ear, going all the way down the sternum. No aggravating/alleviating factors. She did not take any pain medications because she was afraid that she is already taking too many medications. She feels dizzy/lightheaded to the point where she thinks she was going to fall. She would have occasional vomiting, sweating together with the chest pain and dizziness. Dizziness is unrelated to movement. She also noted that the right side of her face is slightly swollen and her right eye feels swollen but no change in vision noted. She sees a pulmonologist Dr Barnes who was coordinating with her cardiologist Dr Contreras to get a cardiac work up done but did not happen because of insurance issue (approval for the test ). She also reports abdominal pain, tingling and cramping of her fingers, toes and legs and arms. When she was in [place] visiting her son 1 month ago, she noted melena/black stools for a couple of days that has now resolved. Denies NSAID or blood thinner use. She woke up feel well this morning but her symptoms recurred and decided to come in for further evaluation. In ER, she was noted to be hyperglycemic with FS in the 300-400s. A1c = 10.9. Beta hydroxy butyrate WNL. She does not have an endocrinologist. Her lactate was elevated 5.6 --> 6.7. CXR clear lungs. Troponin <6. Orthostatic VS negative. She had US head/neck done on  which showed interval decrease in size of the left parotid gland. No parotid enlargement or cyst, abscess, or mass identified in the bilateral neck soft tissues. Patient is being admitted for lactic acidosis.     Patient was admitted with lightheadedness. Orthostatics were negative. US head and neck was normal on 2021. MRI of brain showed ________. Patient was found with chest pain. Troponins were negative. Patient had normal stress echo outpatient. Patient was found with lactic acidosis, most likely due to metformin use. Metformin was held and IV fluids were given. Lactate downtrended. Endocrinology was consulted. Patient was started on basal/bolus insulin. Patient to be discharged on _________ diabetic regimen.     INCOMPLETE    On ___ this case was reviewed with  ____, the patient is medically stable and optimized for discharge. All medications were reviewed and prescriptions were sent to mutually agreed upon pharmacy.                     60 year old female with PMH of DM, kidney stones and sarcoidosis on prednisone is here for lightheadedness. She has been having on/off chest pain accompanied by dizziness/lightheadedness for the past 2 months. In ER, she was noted to be hyperglycemic with FS in the 300-400s. A1c = 10.9. Beta hydroxy butyrate WNL. She does not have an endocrinologist. Her lactate was elevated 5.6 --> 6.7. CXR clear lungs. Troponin <6. Orthostatic VS negative.    Patient was admitted with lightheadedness. Orthostatics were negative. US head and neck was normal on 6/29/2021. MRI of brain negative for intracranial hemorrhage, acute infarct, or mass effect. Age-appropriate volume loss and chronic microvascular changes. Pt also c/o chest pain, troponin were negative. Patient had normal stress echo outpatient. Patient was found with lactic acidosis, most likely due to metformin use. Metformin was held and IV fluids were given. Lactate downtrended. Endocrinology was consulted. Patient was started on basal/bolus insulin. Case discussed with endocrine Dr. Scott, recommend discharge home on basal/bolus insulin, 28units of long acting insulin at bedtime and premeal 11 units TID, outpt follow up in office.     Case was reviewed with Dr. Kelley, labs/vitals reviewed, Pt is medically stable and optimized for discharge. All medications were reviewed and prescriptions were sent to mutually agreed upon pharmacy.

## 2021-07-05 NOTE — DISCHARGE NOTE PROVIDER - NSDCMRMEDTOKEN_GEN_ALL_CORE_FT
atorvastatin 10 mg oral tablet: 1 tab(s) orally once a day  losartan 25 mg oral tablet: 1 tab(s) orally once a day  metFORMIN 1000 mg oral tablet: 1 tab(s) orally 2 times a day  predniSONE 5 mg oral tablet: 3 tab(s) (15mg) orally once a day (Patient notes that she is prescribed 30mg, however, only takes 15mg)   alcohol swabs : Apply topically to affected area 4 times a day   atorvastatin 10 mg oral tablet: 1 tab(s) orally once a day  glucometer (per patient&#x27;s insurance): Test blood sugars four times a day. Dispense #1 glucometer.  HumaLOG KwikPen 100 units/mL injectable solution: 11 unit(s) injectable 3 times a day (before meals)   Insulin Pen Needles, 4mm: 1 application subcutaneously 4 times a day. ** Use with insulin pen **   lancets: 1 application subcutaneously 4 times a day   Lantus Solostar Pen 100 units/mL subcutaneous solution: 28 unit(s) subcutaneous once a day (at bedtime)    Please call with coverage ([employer] #[id number])   losartan 25 mg oral tablet: 1 tab(s) orally once a day  predniSONE 5 mg oral tablet: 3 tab(s) (15mg) orally once a day (Patient notes that she is prescribed 30mg, however, only takes 15mg)  test strips (per patient&#x27;s insurance): 1 application subcutaneously 4 times a day. ** Compatible with patient&#x27;s glucometer **   alcohol swabs : Apply topically to affected area 4 times a day   atorvastatin 10 mg oral tablet: 1 tab(s) orally once a day  glucometer (per patient&#x27;s insurance): Test blood sugars four times a day. Dispense #1 glucometer.  HumaLOG KwikPen 100 units/mL injectable solution: 11 unit(s) injectable 3 times a day (before meals)   Insulin Pen Needles, 4mm: 1 application subcutaneously 4 times a day. ** Use with insulin pen **   lancets: 1 application subcutaneously 4 times a day   Lantus Solostar Pen 100 units/mL subcutaneous solution: 28 unit(s) subcutaneous once a day (at bedtime)    Please call with coverage ([employer] #[id number])   losartan 25 mg oral tablet: 1 tab(s) orally once a day  predniSONE 5 mg oral tablet: 3 tab(s) (15mg) orally once a day   test strips (per patient&#x27;s insurance): 1 application subcutaneously 4 times a day. ** Compatible with patient&#x27;s glucometer **   alcohol swabs : Apply topically to affected area 4 times a day   atorvastatin 10 mg oral tablet: 1 tab(s) orally once a day  glucometer (per patient&#x27;s insurance): Test blood sugars four times a day. Dispense #1 glucometer.  HumaLOG 100 units/mL injectable solution: 11 unit(s) injectable 3 times a day (with meals)   Insulin Pen Needles, 4mm: 1 application subcutaneously 4 times a day. ** Use with insulin pen **   lancets: 1 application subcutaneously 4 times a day   Lantus Solostar Pen 100 units/mL subcutaneous solution: 28 unit(s) subcutaneous once a day (at bedtime)    Please call with coverage ([employer] #[id number])   losartan 25 mg oral tablet: 1 tab(s) orally once a day  predniSONE 5 mg oral tablet: 3 tab(s) (15mg) orally once a day   test strips (per patient&#x27;s insurance): 1 application subcutaneously 4 times a day. ** Compatible with patient&#x27;s glucometer **  U-100 Insulin Syringe, 1 mL: 1 application subcutaneously 2 times a day ** 1 mL holds up to 100 units of insulin **

## 2021-07-05 NOTE — DISCHARGE NOTE PROVIDER - CARE PROVIDER_API CALL
Deepak Olivera  INTERNAL MEDICINE  [address] [place] [address]  Phone: [phone]  Fax: [phone]  Follow Up Time:     Pari Barnes  Pulmonary Medicine  [address] [place] [address]  Phone: [phone]  Fax: [phone]  Follow Up Time:     Ramon Contreras  CARDIOVASCULAR DISEASE  [address] [place] [address]  Phone: [phone]  Fax: [phone]  Follow Up Time:     Lucas Patel; PhD)  Neurology  [address] [facility], [address]  [place] [address]  Phone: [phone]  Fax: [phone]  Follow Up Time:

## 2021-07-05 NOTE — DISCHARGE NOTE PROVIDER - NSDCFUSCHEDAPPT_GEN_ALL_CORE_FT
COLTRIFELIX, LACHMIN ; 07/12/2021 ; NPP Gastro [address]  SUGRIM, LACHMIN ; 07/16/2021 ; NPP Otolaryng [address]  SHERRY, LACHMIN ; 07/21/2021 ; NPP Rad CAT  [address]  OLIVIA LACHMIN ; 07/21/2021 ; [facility] PreAdmits  SHERRY, LACHMIN ; 07/29/2021 ; NPP Rheum [address]  SUGRIFELIX LACHMIN ; 09/09/2021 ; NPP Neuro [address] [facility] SUGRIM, LACHMIN ; 07/12/2021 ; NPP Gastro [address]  SUGRIM, LACHMIN ; 07/16/2021 ; NPP Otolaryng [address]  SUGRIM, LACHMIN ; 07/21/2021 ; NPP Rad CAT  [address]  SUGRI, LACHMIN ; 07/21/2021 ; [facility] PreAdmits  SUGRIM, LACHMIN ; 07/29/2021 ; NPP Rheum [address]  SUGRIM, LACHMIN ; 08/02/2021 ; NPP Intmed [address]  SUGRI, LACHMIN ; 09/09/2021 ; NPP Neuro [address] [facility]

## 2021-07-05 NOTE — PROGRESS NOTE ADULT - PROBLEM SELECTOR PLAN 4
- uncontrolled DM  - prednisone use contributing to elevated FS  - a1c 10.9  - hold home metformin  - endocrine consulted , started on basal bolus insulin lantus  22u hs and premeal 8u actid, ISS  --apprec endo recs,  STOP metformin given lactic acidosis. Plan to DC on basal/bolus vs basal plus oral agent such as DPP4i (ex: Januvia 100 mg daily, Tradjenta 5 mg daily)  -insulin teaching pre-discharge  - [facility] f/u @ [facility]  [address] [place] [address], [address], [place], [place] [address] [phone]

## 2021-07-05 NOTE — DISCHARGE NOTE PROVIDER - CARE PROVIDERS DIRECT ADDRESSES
[email or web address]

## 2021-07-05 NOTE — DISCHARGE NOTE PROVIDER - INSTRUCTIONS
Low cholesterol diet. Salt restriction. 1800Kcal diabetic diet with carb restriction. Avoid complex carbohydrates such as bread, pasta, cereal, white rice, white potatoes, etc. Avoid concentrated sugar as found in desserts, candy, soda, juice, etc. Consume a diet based on lean protein (chicken, fish) and vegetables.

## 2021-07-05 NOTE — PROGRESS NOTE ADULT - SUBJECTIVE AND OBJECTIVE BOX
Dr. Guillermina Lugo  Pager [phone]    PROGRESS NOTE:     Patient is a 60y old  Female who presents with a chief complaint of lightheaded (05 Jul 2021 11:55)      SUBJECTIVE / OVERNIGHT EVENTS: pt denies chest pain/sob/active dizziness  ADDITIONAL REVIEW OF SYSTEMS: afebrile    MEDICATIONS  (STANDING):  atorvastatin 10 milliGRAM(s) Oral at bedtime  dextrose 40% Gel 15 Gram(s) Oral once  dextrose 50% Injectable 25 Gram(s) IV Push once  enoxaparin Injectable 40 milliGRAM(s) SubCutaneous daily  insulin glargine Injectable (LANTUS) 22 Unit(s) SubCutaneous at bedtime  insulin lispro (ADMELOG) corrective regimen sliding scale   SubCutaneous three times a day before meals  insulin lispro (ADMELOG) corrective regimen sliding scale   SubCutaneous at bedtime  insulin lispro Injectable (ADMELOG) 8 Unit(s) SubCutaneous three times a day before meals  losartan 25 milliGRAM(s) Oral daily  predniSONE   Tablet 15 milliGRAM(s) Oral daily  sodium chloride 0.9%. 1000 milliLiter(s) (75 mL/Hr) IV Continuous <Continuous>    MEDICATIONS  (PRN):  acetaminophen   Tablet .. 650 milliGRAM(s) Oral every 6 hours PRN Temp greater or equal to 38C (100.4F), Mild Pain (1 - 3)      CAPILLARY BLOOD GLUCOSE      POCT Blood Glucose.: 270 mg/dL (05 Jul 2021 12:54)  POCT Blood Glucose.: 292 mg/dL (05 Jul 2021 09:02)  POCT Blood Glucose.: 283 mg/dL (04 Jul 2021 21:00)  POCT Blood Glucose.: 215 mg/dL (04 Jul 2021 17:35)  POCT Blood Glucose.: 268 mg/dL (04 Jul 2021 13:14)    I&O's Summary    04 Jul 2021 07:01  -  05 Jul 2021 07:00  --------------------------------------------------------  IN: 350 mL / OUT: 1450 mL / NET: -1100 mL        PHYSICAL EXAM:  Vital Signs Last 24 Hrs  T(C): 36.8 (05 Jul 2021 11:59), Max: 37.2 (04 Jul 2021 21:45)  T(F): 98.2 (05 Jul 2021 11:59), Max: 99 (04 Jul 2021 21:45)  HR: 86 (05 Jul 2021 11:59) (71 - 94)  BP: 141/82 (05 Jul 2021 11:59) (115/73 - 141/82)  BP(mean): --  RR: 16 (05 Jul 2021 11:59) (16 - 18)  SpO2: 96% (05 Jul 2021 11:59) (96% - 100%)  CONSTITUTIONAL: NAD, well-developed  RESPIRATORY: Normal respiratory effort; lungs are clear to auscultation bilaterally  CARDIOVASCULAR: Regular rate and rhythm, normal S1 and S2, no murmur/rub/gallop; No lower extremity edema; Peripheral pulses are 2+ bilaterally  ABDOMEN: Nontender to palpation, normoactive bowel sounds, no rebound/guarding; No hepatosplenomegaly  MUSCULOSKELETAL: no clubbing or cyanosis of digits; no joint swelling or tenderness to palpation  PSYCH: A+O to person, place, and time; affect appropriate      LABS:                        13.5   5.59  )-----------( 196      ( 05 Jul 2021 06:19 )             40.6     07-05    138  |  102  |  19  ----------------------------<  197<H>  3.6   |  22  |  0.58    Ca    9.3      05 Jul 2021 06:19  Phos  4.6     07-05  Mg     1.90     07-05        RADIOLOGY & ADDITIONAL TESTS:  Results Reviewed:   Imaging Personally Reviewed:  Electrocardiogram Personally Reviewed:    COORDINATION OF CARE:  Care Discussed with Consultants/Other Providers [Y/N]: stephen Callahanite MRI  Prior or Outpatient Records Reviewed [Y/N]:

## 2021-07-05 NOTE — PROGRESS NOTE ADULT - PROBLEM SELECTOR PLAN 1
- dizziness/lightheaded related to movement, sometimes occurs at rest   - seen by neurologist Dr. Lucas Patel outpatient, thought to be related to hyperventilation  - orthostatic VS negative   - US head/neck on 06/29/2021 normal   - Emailed MRI supervisor to expedite MRI brain non con, although nonfocal symptoms, no finger to nose dysmetria or heel to shin, doubt central infarct  -Dispo: pt can be discharge home if MRI negative

## 2021-07-05 NOTE — PROGRESS NOTE ADULT - SUBJECTIVE AND OBJECTIVE BOX
Diabetes  Follow up note    Interval History:  Pt feeling better, reports no nausea, vomiting, normal appetite    MEDICATIONS  (STANDING):  atorvastatin 10 milliGRAM(s) Oral at bedtime  dextrose 40% Gel 15 Gram(s) Oral once  dextrose 50% Injectable 25 Gram(s) IV Push once  enoxaparin Injectable 40 milliGRAM(s) SubCutaneous daily  insulin glargine Injectable (LANTUS) 22 Unit(s) SubCutaneous at bedtime  insulin lispro (ADMELOG) corrective regimen sliding scale   SubCutaneous three times a day before meals  insulin lispro (ADMELOG) corrective regimen sliding scale   SubCutaneous at bedtime  insulin lispro Injectable (ADMELOG) 8 Unit(s) SubCutaneous three times a day before meals  losartan 25 milliGRAM(s) Oral daily  predniSONE   Tablet 15 milliGRAM(s) Oral daily  sodium chloride 0.9%. 1000 milliLiter(s) (75 mL/Hr) IV Continuous <Continuous>    MEDICATIONS  (PRN):  acetaminophen   Tablet .. 650 milliGRAM(s) Oral every 6 hours PRN Temp greater or equal to 38C (100.4F), Mild Pain (1 - 3)      Allergies    No Known Allergies    Intolerances          PHYSICAL EXAM:  VITALS: T(C): 37.2 (07-05-21 @ 05:23)  T(F): 98.9 (07-05-21 @ 05:23), Max: 99 (07-04-21 @ 21:45)  HR: 71 (07-05-21 @ 05:23) (71 - 94)  BP: 128/82 (07-05-21 @ 05:23) (115/73 - 141/76)  RR:  (18 - 18)  SpO2:  (96% - 100%)  GENERAL: sitting up in bed in NAD,   EYES: No proptosis,  HEENT:  Atraumatic, Normocephalic,   RESPIRATORY: Clear to auscultation bilaterally  CARDIOVASCULAR: Regular rhythm; No murmurs; no peripheral edema  GI: Soft, nontender, non distended, normal bowel sounds      POCT Blood Glucose.: 292 mg/dL (07-05-21 @ 09:02)  POCT Blood Glucose.: 283 mg/dL (07-04-21 @ 21:00)  POCT Blood Glucose.: 215 mg/dL (07-04-21 @ 17:35)  POCT Blood Glucose.: 268 mg/dL (07-04-21 @ 13:14)  POCT Blood Glucose.: 278 mg/dL (07-04-21 @ 08:50)  POCT Blood Glucose.: 184 mg/dL (07-03-21 @ 22:30)  POCT Blood Glucose.: 193 mg/dL (07-03-21 @ 21:05)  POCT Blood Glucose.: 277 mg/dL (07-03-21 @ 18:53)  POCT Blood Glucose.: 294 mg/dL (07-03-21 @ 17:26)  POCT Blood Glucose.: 331 mg/dL (07-03-21 @ 11:38)        07-05    138  |  102  |  19  ----------------------------<  197<H>  3.6   |  22  |  0.58    EGFR if : 114  EGFR if non : 99    Ca    9.3      07-05  Mg     1.90     07-05  Phos  4.6     07-05    TPro  6.6  /  Alb  4.0  /  TBili  0.6  /  DBili  x   /  AST  20  /  ALT  31  /  AlkPhos  99  07-03        A1C with Estimated Average Glucose Result: 10.9 % (07-03-21 @ 12:35)

## 2021-07-05 NOTE — DISCHARGE NOTE PROVIDER - NSDCCPCAREPLAN_GEN_ALL_CORE_FT
PRINCIPAL DISCHARGE DIAGNOSIS  Diagnosis: Lactic acidosis  Assessment and Plan of Treatment: Likely due to Metformin use. Do not take Metformin. You were switched to insulin before meals and at bedtime to control your blood sugar.   Follow up with your primary care physician for further monitoring in 1-2 weeks. Please call to arrange appointment.      SECONDARY DISCHARGE DIAGNOSES  Diagnosis: DM (diabetes mellitus)  Assessment and Plan of Treatment: Follow up with your Endocrinologist for further monitoring in 1-2 weeks. Please call to arrange appointment.   Continue insulin as directed 3 x a day before meals and at bedtime.   Please check you fingersticks every morning, or if you are not feeling well and before meals.  If your fingerstick is >300 x 2 or more readings. Please contact primary doctor or endocrinologist.  If your fingerstick is less than 70 and/or you have symptoms of very low blood sugar, FIRST drink 1/2 cup of apple juice (or take 4 glucose tabs) and recheck fingerstick in 15 minutes. Repeat these steps until blood sugar is above 100, if necessary then call your doctor to discuss low blood sugar.   Avoid complex carbohydrates such as bread, pasta, cereal, white rice, white potatoes, etc. Avoid concentrated sugar as found in desserts, candy, soda, juice, etc. Consume a diet based on lean protein (chicken, fish) and vegetables.    Diagnosis: Hyperlipidemia, unspecified hyperlipidemia type  Assessment and Plan of Treatment: Continue Lipitor at bedtime. Follow up with your primary care physician for further monitoring in 1-2 weeks. Please call to arrange appointment. Low cholesterol diet. Salt restriction. 1800Kcal diabetic diet with carb restriction.    Diagnosis: HTN (hypertension)  Assessment and Plan of Treatment: Follow up with your primary care physician for further monitoring in 1-2 weeks. Please call to arrange appointment. Continue Losartan daily. Low cholesterol diet. Salt restriction. 1800Kcal diabetic diet with carb restriction.

## 2021-07-05 NOTE — DISCHARGE NOTE PROVIDER - NSFOLLOWUPCLINICSTOKEN_GEN_ALL_ED_FT
[id number]: || ||00\01||False; [id number]: || ||00\01||False;[id number]: || ||00\01||False;[id number]: || ||00\01||False;

## 2021-07-05 NOTE — DISCHARGE NOTE PROVIDER - NSFOLLOWUPCLINICS_GEN_ALL_ED_FT
[facility] Endocrinology  Endocrinology  [address] [place] [address]  Phone: [phone]  Fax:      [facility] Endocrinology  Endocrinology  [address] [place] [address]  Phone: [phone]  Fax:     [facility] ENT  ENT  [address], [address]  [place] [address]  Phone: [phone]  Fax:     [facility] Pulmonolgy and Sleep Medicine  Pulmonology  [address], [address]  [place] [address]  Phone: [phone]  Fax:

## 2021-07-05 NOTE — DISCHARGE NOTE PROVIDER - PROVIDER TOKENS
PROVIDER:[TOKEN:[[id number]]],PROVIDER:[TOKEN:[[id number]]],PROVIDER:[TOKEN:[[id number]]],PROVIDER:[TOKEN:[[id number]]]

## 2021-07-06 ENCOUNTER — TRANSCRIPTION ENCOUNTER (OUTPATIENT)
Age: 61
End: 2021-07-06

## 2021-07-06 LAB
ANION GAP SERPL CALC-SCNC: 14 MMOL/L — SIGNIFICANT CHANGE UP (ref 7–14)
BASOPHILS # BLD AUTO: 0.02 K/UL — SIGNIFICANT CHANGE UP (ref 0–0.2)
BASOPHILS NFR BLD AUTO: 0.3 % — SIGNIFICANT CHANGE UP (ref 0–2)
BUN SERPL-MCNC: 18 MG/DL — SIGNIFICANT CHANGE UP (ref 7–23)
CALCIUM SERPL-MCNC: 9.7 MG/DL — SIGNIFICANT CHANGE UP (ref 8.4–10.5)
CHLORIDE SERPL-SCNC: 101 MMOL/L — SIGNIFICANT CHANGE UP (ref 98–107)
CO2 SERPL-SCNC: 21 MMOL/L — LOW (ref 22–31)
CREAT SERPL-MCNC: 0.65 MG/DL — SIGNIFICANT CHANGE UP (ref 0.5–1.3)
EOSINOPHIL # BLD AUTO: 0.13 K/UL — SIGNIFICANT CHANGE UP (ref 0–0.5)
EOSINOPHIL NFR BLD AUTO: 2 % — SIGNIFICANT CHANGE UP (ref 0–6)
GLUCOSE BLDC GLUCOMTR-MCNC: 114 MG/DL — HIGH (ref 70–99)
GLUCOSE BLDC GLUCOMTR-MCNC: 208 MG/DL — HIGH (ref 70–99)
GLUCOSE BLDC GLUCOMTR-MCNC: 255 MG/DL — HIGH (ref 70–99)
GLUCOSE BLDC GLUCOMTR-MCNC: 274 MG/DL — HIGH (ref 70–99)
GLUCOSE SERPL-MCNC: 212 MG/DL — HIGH (ref 70–99)
HCT VFR BLD CALC: 42.4 % — SIGNIFICANT CHANGE UP (ref 34.5–45)
HGB BLD-MCNC: 14.2 G/DL — SIGNIFICANT CHANGE UP (ref 11.5–15.5)
IANC: 3.56 K/UL — SIGNIFICANT CHANGE UP (ref 1.5–8.5)
IMM GRANULOCYTES NFR BLD AUTO: 0.5 % — SIGNIFICANT CHANGE UP (ref 0–1.5)
LYMPHOCYTES # BLD AUTO: 1.91 K/UL — SIGNIFICANT CHANGE UP (ref 1–3.3)
LYMPHOCYTES # BLD AUTO: 29.7 % — SIGNIFICANT CHANGE UP (ref 13–44)
MAGNESIUM SERPL-MCNC: 1.9 MG/DL — SIGNIFICANT CHANGE UP (ref 1.6–2.6)
MCHC RBC-ENTMCNC: 31.4 PG — SIGNIFICANT CHANGE UP (ref 27–34)
MCHC RBC-ENTMCNC: 33.5 GM/DL — SIGNIFICANT CHANGE UP (ref 32–36)
MCV RBC AUTO: 93.8 FL — SIGNIFICANT CHANGE UP (ref 80–100)
MONOCYTES # BLD AUTO: 0.79 K/UL — SIGNIFICANT CHANGE UP (ref 0–0.9)
MONOCYTES NFR BLD AUTO: 12.3 % — SIGNIFICANT CHANGE UP (ref 2–14)
NEUTROPHILS # BLD AUTO: 3.56 K/UL — SIGNIFICANT CHANGE UP (ref 1.8–7.4)
NEUTROPHILS NFR BLD AUTO: 55.2 % — SIGNIFICANT CHANGE UP (ref 43–77)
NRBC # BLD: 0 /100 WBCS — SIGNIFICANT CHANGE UP
NRBC # FLD: 0 K/UL — SIGNIFICANT CHANGE UP
PHOSPHATE SERPL-MCNC: 4.8 MG/DL — HIGH (ref 2.5–4.5)
PLATELET # BLD AUTO: 206 K/UL — SIGNIFICANT CHANGE UP (ref 150–400)
POTASSIUM SERPL-MCNC: 3.9 MMOL/L — SIGNIFICANT CHANGE UP (ref 3.5–5.3)
POTASSIUM SERPL-SCNC: 3.9 MMOL/L — SIGNIFICANT CHANGE UP (ref 3.5–5.3)
RBC # BLD: 4.52 M/UL — SIGNIFICANT CHANGE UP (ref 3.8–5.2)
RBC # FLD: 12.9 % — SIGNIFICANT CHANGE UP (ref 10.3–14.5)
SODIUM SERPL-SCNC: 136 MMOL/L — SIGNIFICANT CHANGE UP (ref 135–145)
WBC # BLD: 6.44 K/UL — SIGNIFICANT CHANGE UP (ref 3.8–10.5)
WBC # FLD AUTO: 6.44 K/UL — SIGNIFICANT CHANGE UP (ref 3.8–10.5)

## 2021-07-06 PROCEDURE — 99233 SBSQ HOSP IP/OBS HIGH 50: CPT

## 2021-07-06 PROCEDURE — 99232 SBSQ HOSP IP/OBS MODERATE 35: CPT

## 2021-07-06 RX ORDER — ISOPROPYL ALCOHOL, BENZOCAINE .7; .06 ML/ML; ML/ML
1 SWAB TOPICAL
Qty: 100 | Refills: 1
Start: 2021-07-06 | End: 2021-08-24

## 2021-07-06 RX ORDER — ENOXAPARIN SODIUM 100 MG/ML
28 INJECTION SUBCUTANEOUS
Qty: 5 | Refills: 0
Start: 2021-07-06 | End: 2021-08-04

## 2021-07-06 RX ORDER — INSULIN GLARGINE 100 [IU]/ML
28 INJECTION, SOLUTION SUBCUTANEOUS AT BEDTIME
Refills: 0 | Status: DISCONTINUED | OUTPATIENT
Start: 2021-07-06 | End: 2021-07-07

## 2021-07-06 RX ORDER — INSULIN LISPRO 100/ML
11 VIAL (ML) SUBCUTANEOUS
Qty: 5 | Refills: 0
Start: 2021-07-06 | End: 2021-08-04

## 2021-07-06 RX ORDER — INSULIN LISPRO 100/ML
11 VIAL (ML) SUBCUTANEOUS
Refills: 0 | Status: DISCONTINUED | OUTPATIENT
Start: 2021-07-06 | End: 2021-07-07

## 2021-07-06 RX ORDER — METFORMIN HYDROCHLORIDE 850 MG/1
1 TABLET ORAL
Qty: 0 | Refills: 0 | DISCHARGE

## 2021-07-06 RX ADMIN — Medication 6: at 09:07

## 2021-07-06 RX ADMIN — ENOXAPARIN SODIUM 40 MILLIGRAM(S): 100 INJECTION SUBCUTANEOUS at 12:50

## 2021-07-06 RX ADMIN — Medication 10 UNIT(S): at 12:51

## 2021-07-06 RX ADMIN — Medication 0: at 18:15

## 2021-07-06 RX ADMIN — INSULIN GLARGINE 28 UNIT(S): 100 INJECTION, SOLUTION SUBCUTANEOUS at 22:26

## 2021-07-06 RX ADMIN — Medication 6: at 12:50

## 2021-07-06 RX ADMIN — ATORVASTATIN CALCIUM 10 MILLIGRAM(S): 80 TABLET, FILM COATED ORAL at 22:27

## 2021-07-06 RX ADMIN — Medication 15 MILLIGRAM(S): at 05:31

## 2021-07-06 RX ADMIN — LOSARTAN POTASSIUM 25 MILLIGRAM(S): 100 TABLET, FILM COATED ORAL at 05:31

## 2021-07-06 RX ADMIN — Medication 10 UNIT(S): at 18:16

## 2021-07-06 RX ADMIN — Medication 10 UNIT(S): at 09:07

## 2021-07-06 NOTE — PROGRESS NOTE ADULT - SUBJECTIVE AND OBJECTIVE BOX
Rea Kelley  Hospitalist  Pager- [phone]      PROGRESS NOTE:     Patient is a 60y old  Female who presents with a chief complaint of lightheaded (05 Jul 2021 11:55)      SUBJECTIVE / OVERNIGHT EVENTS: pt denies chest pain/sob/active dizziness  ADDITIONAL REVIEW OF SYSTEMS: afebrile    MEDICATIONS  (STANDING):  atorvastatin 10 milliGRAM(s) Oral at bedtime  dextrose 40% Gel 15 Gram(s) Oral once  dextrose 50% Injectable 25 Gram(s) IV Push once  enoxaparin Injectable 40 milliGRAM(s) SubCutaneous daily  insulin glargine Injectable (LANTUS) 22 Unit(s) SubCutaneous at bedtime  insulin lispro (ADMELOG) corrective regimen sliding scale   SubCutaneous three times a day before meals  insulin lispro (ADMELOG) corrective regimen sliding scale   SubCutaneous at bedtime  insulin lispro Injectable (ADMELOG) 10 Unit(s) SubCutaneous three times a day before meals  losartan 25 milliGRAM(s) Oral daily  predniSONE   Tablet 15 milliGRAM(s) Oral daily  sodium chloride 0.9%. 1000 milliLiter(s) (75 mL/Hr) IV Continuous <Continuous>    MEDICATIONS  (PRN):  acetaminophen   Tablet .. 650 milliGRAM(s) Oral every 6 hours PRN Temp greater or equal to 38C (100.4F), Mild Pain (1 - 3)        CAPILLARY BLOOD GLUCOSE  POCT Blood Glucose.: 274 mg/dL (06 Jul 2021 08:42)  POCT Blood Glucose.: 208 mg/dL (05 Jul 2021 21:03)  POCT Blood Glucose.: 177 mg/dL (05 Jul 2021 17:45)  POCT Blood Glucose.: 270 mg/dL (05 Jul 2021 12:54)  I&O's Summary    04 Jul 2021 07:01  -  05 Jul 2021 07:00  --------------------------------------------------------  IN: 350 mL / OUT: 1450 mL / NET: -1100 mL        PHYSICAL EXAM:    Vital Signs Last 24 Hrs  T(C): 36.7 (06 Jul 2021 08:05), Max: 36.8 (05 Jul 2021 11:59)  T(F): 98 (06 Jul 2021 08:05), Max: 98.2 (05 Jul 2021 11:59)  HR: 74 (06 Jul 2021 08:05) (74 - 87)  BP: 124/83 (06 Jul 2021 08:05) (108/72 - 141/82)  BP(mean): --  RR: 16 (06 Jul 2021 08:05) (16 - 18)  SpO2: 99% (06 Jul 2021 08:05) (96% - 100%)      CONSTITUTIONAL: NAD, well-developed  RESPIRATORY: Normal respiratory effort; lungs are clear to auscultation bilaterally  CARDIOVASCULAR: Regular rate and rhythm, normal S1 and S2, no murmur/rub/gallop; No lower extremity edema; Peripheral pulses are 2+ bilaterally  ABDOMEN: Nontender to palpation, normoactive bowel sounds, no rebound/guarding; No hepatosplenomegaly  MUSCULOSKELETAL: no clubbing or cyanosis of digits; no joint swelling or tenderness to palpation  PSYCH: A+O to person, place, and time; affect appropriate      LABS:                                        14.2   6.44  )-----------( 206      ( 06 Jul 2021 07:07 )             42.4     07-06    136  |  101  |  18  ----------------------------<  212<H>  3.9   |  21<L>  |  0.65    Ca    9.7      06 Jul 2021 07:07  Phos  4.8     07-06  Mg     1.90     07-06          RADIOLOGY & ADDITIONAL TESTS:  Results Reviewed:   Imaging Personally Reviewed:  Electrocardiogram Personally Reviewed:    COORDINATION OF CARE:  Care Discussed with Consultants/Other Providers [Y/N]:   Prior or Outpatient Records Reviewed [Y/N]:   Rea Kelley  Hospitalist  Pager- [phone]      PROGRESS NOTE:     Patient is a 60y old  Female who presents with a chief complaint of lightheaded (05 Jul 2021 11:55)      SUBJECTIVE / OVERNIGHT EVENTS: pt denies chest pain/sob/active dizziness  no new events   Awaiting MRI report and Insulin teaching   ADDITIONAL REVIEW OF SYSTEMS: afebrile    MEDICATIONS  (STANDING):  atorvastatin 10 milliGRAM(s) Oral at bedtime  dextrose 40% Gel 15 Gram(s) Oral once  dextrose 50% Injectable 25 Gram(s) IV Push once  enoxaparin Injectable 40 milliGRAM(s) SubCutaneous daily  insulin glargine Injectable (LANTUS) 22 Unit(s) SubCutaneous at bedtime  insulin lispro (ADMELOG) corrective regimen sliding scale   SubCutaneous three times a day before meals  insulin lispro (ADMELOG) corrective regimen sliding scale   SubCutaneous at bedtime  insulin lispro Injectable (ADMELOG) 10 Unit(s) SubCutaneous three times a day before meals  losartan 25 milliGRAM(s) Oral daily  predniSONE   Tablet 15 milliGRAM(s) Oral daily  sodium chloride 0.9%. 1000 milliLiter(s) (75 mL/Hr) IV Continuous <Continuous>    MEDICATIONS  (PRN):  acetaminophen   Tablet .. 650 milliGRAM(s) Oral every 6 hours PRN Temp greater or equal to 38C (100.4F), Mild Pain (1 - 3)        CAPILLARY BLOOD GLUCOSE  POCT Blood Glucose.: 274 mg/dL (06 Jul 2021 08:42)  POCT Blood Glucose.: 208 mg/dL (05 Jul 2021 21:03)  POCT Blood Glucose.: 177 mg/dL (05 Jul 2021 17:45)  POCT Blood Glucose.: 270 mg/dL (05 Jul 2021 12:54)  I&O's Summary    04 Jul 2021 07:01  -  05 Jul 2021 07:00  --------------------------------------------------------  IN: 350 mL / OUT: 1450 mL / NET: -1100 mL        PHYSICAL EXAM:    Vital Signs Last 24 Hrs  T(C): 36.7 (06 Jul 2021 08:05), Max: 36.8 (05 Jul 2021 11:59)  T(F): 98 (06 Jul 2021 08:05), Max: 98.2 (05 Jul 2021 11:59)  HR: 74 (06 Jul 2021 08:05) (74 - 87)  BP: 124/83 (06 Jul 2021 08:05) (108/72 - 141/82)  BP(mean): --  RR: 16 (06 Jul 2021 08:05) (16 - 18)  SpO2: 99% (06 Jul 2021 08:05) (96% - 100%)      CONSTITUTIONAL: NAD, well-developed  RESPIRATORY: Normal respiratory effort; lungs are clear to auscultation bilaterally  CARDIOVASCULAR: Regular rate and rhythm, normal S1 and S2, no murmur/rub/gallop; No lower extremity edema; Peripheral pulses are 2+ bilaterally  ABDOMEN: Nontender to palpation, normoactive bowel sounds, no rebound/guarding; No hepatosplenomegaly  MUSCULOSKELETAL: no clubbing or cyanosis of digits; no joint swelling or tenderness to palpation  PSYCH: A+O to person, place, and time; affect appropriate      LABS:                                        14.2   6.44  )-----------( 206      ( 06 Jul 2021 07:07 )             42.4     07-06    136  |  101  |  18  ----------------------------<  212<H>  3.9   |  21<L>  |  0.65    Ca    9.7      06 Jul 2021 07:07  Phos  4.8     07-06  Mg     1.90     07-06          RADIOLOGY & ADDITIONAL TESTS:  Results Reviewed:   Imaging Personally Reviewed:  Electrocardiogram Personally Reviewed:    COORDINATION OF CARE:  Care Discussed with Consultants/Other Providers [Y/N]:   Prior or Outpatient Records Reviewed [Y/N]:   Rea Kelley  Hospitalist  Pager- [phone]      PROGRESS NOTE:     Patient is a 60y old  Female who presents with a chief complaint of lightheaded (05 Jul 2021 11:55)      SUBJECTIVE / OVERNIGHT EVENTS: pt denies chest pain/sob/active dizziness  no new events   Reports improvement in dizziness  ADDITIONAL REVIEW OF SYSTEMS: afebrile    MEDICATIONS  (STANDING):  atorvastatin 10 milliGRAM(s) Oral at bedtime  dextrose 40% Gel 15 Gram(s) Oral once  dextrose 50% Injectable 25 Gram(s) IV Push once  enoxaparin Injectable 40 milliGRAM(s) SubCutaneous daily  insulin glargine Injectable (LANTUS) 22 Unit(s) SubCutaneous at bedtime  insulin lispro (ADMELOG) corrective regimen sliding scale   SubCutaneous three times a day before meals  insulin lispro (ADMELOG) corrective regimen sliding scale   SubCutaneous at bedtime  insulin lispro Injectable (ADMELOG) 10 Unit(s) SubCutaneous three times a day before meals  losartan 25 milliGRAM(s) Oral daily  predniSONE   Tablet 15 milliGRAM(s) Oral daily  sodium chloride 0.9%. 1000 milliLiter(s) (75 mL/Hr) IV Continuous <Continuous>    MEDICATIONS  (PRN):  acetaminophen   Tablet .. 650 milliGRAM(s) Oral every 6 hours PRN Temp greater or equal to 38C (100.4F), Mild Pain (1 - 3)        CAPILLARY BLOOD GLUCOSE  POCT Blood Glucose.: 274 mg/dL (06 Jul 2021 08:42)  POCT Blood Glucose.: 208 mg/dL (05 Jul 2021 21:03)  POCT Blood Glucose.: 177 mg/dL (05 Jul 2021 17:45)  POCT Blood Glucose.: 270 mg/dL (05 Jul 2021 12:54)  I&O's Summary    04 Jul 2021 07:01  -  05 Jul 2021 07:00  --------------------------------------------------------  IN: 350 mL / OUT: 1450 mL / NET: -1100 mL        PHYSICAL EXAM:    Vital Signs Last 24 Hrs  T(C): 36.7 (06 Jul 2021 08:05), Max: 36.8 (05 Jul 2021 11:59)  T(F): 98 (06 Jul 2021 08:05), Max: 98.2 (05 Jul 2021 11:59)  HR: 74 (06 Jul 2021 08:05) (74 - 87)  BP: 124/83 (06 Jul 2021 08:05) (108/72 - 141/82)  BP(mean): --  RR: 16 (06 Jul 2021 08:05) (16 - 18)  SpO2: 99% (06 Jul 2021 08:05) (96% - 100%)      CONSTITUTIONAL: NAD, well-developed  RESPIRATORY: Normal respiratory effort; lungs are clear to auscultation bilaterally  CARDIOVASCULAR: Regular rate and rhythm, normal S1 and S2, no murmur/rub/gallop; No lower extremity edema; Peripheral pulses are 2+ bilaterally  ABDOMEN: Nontender to palpation, normoactive bowel sounds, no rebound/guarding; No hepatosplenomegaly  MUSCULOSKELETAL: no clubbing or cyanosis of digits; no joint swelling or tenderness to palpation  PSYCH: A+O to person, place, and time; affect appropriate      LABS:                                        14.2   6.44  )-----------( 206      ( 06 Jul 2021 07:07 )             42.4     07-06    136  |  101  |  18  ----------------------------<  212<H>  3.9   |  21<L>  |  0.65    Ca    9.7      06 Jul 2021 07:07  Phos  4.8     07-06  Mg     1.90     07-06          RADIOLOGY & ADDITIONAL TESTS:  Results Reviewed:   Imaging Personally Reviewed:  Electrocardiogram Personally Reviewed:    COORDINATION OF CARE:  Care Discussed with Consultants/Other Providers [Y/N]:   Prior or Outpatient Records Reviewed [Y/N]:

## 2021-07-06 NOTE — PROGRESS NOTE ADULT - ASSESSMENT
60 year old female T2DM (A1C 10.9) on metformin c/b mild neuropathy. Also with hx of sarcoidosis on steroids. P/w c chest pain and dizziness/lightheadedness. Endocrine consulted for T2DM with hyperglycemia     #uncontrolled T2DM with hyperglycemia  - goal glucose 100-180, BG above goal  - Increase Lantus to 28 units qhs  -Increase Admelog to 11/11/11 units premeal  - c/w moderate pre-meal correction scale and moderate HS correction scale  -On rpednisone 15mg daily (sarcoid)  - carb consistent diet  - registered dietician ubaldo  - will need insulin teaching prior to DC  Discharge  - STOP metformin given lactic acidosis.   Plan to DC on basal/bolus insulin same as above doses  primary team to clarify if pens vs vials covered and teach appropriate one  - will need RX for insulin PEN with pen needles  or vials + syringes per insurance   also send script for freestyle Chana 14 day sensors (can use phone as reader)  - [facility] f/u @ [facility]  [address] [place] [address], [address], [place] [address]  [phone]      #Lactic acidosis likely due to metformin  - hold metformin inpatient and on discharge  - LA improving off metformin  - cont to trend lactate until normalized    #HTN  goal BP<130/80 (controlled)  continue home ARB    #HLD  continue statin     discussed with primary team ACP    Maria T Jones MD  Division of Endocrinology  Pager: [phone]    If after 6PM or before 9AM, or on weekends/holidays, please call endocrine answering service for assistance ([phone]).  For nonurgent matters email [email or web address] for assistance. 60 year old female T2DM (A1C 10.9) on metformin c/b mild neuropathy. Also with hx of sarcoidosis on steroids. P/w c chest pain and dizziness/lightheadedness. Endocrine consulted for T2DM with hyperglycemia     #uncontrolled T2DM with hyperglycemia  - goal glucose 100-180, BG above goal  - Increase Lantus to 28 units qhs  -Increase Admelog to 11/11/11 units premeal  - c/w moderate pre-meal correction scale and moderate HS correction scale  -On prednisone 15mg daily (sarcoid)  - carb consistent diet  - registered dietician ubaldo  - will need insulin teaching prior to DC  Discharge  - STOP metformin given lactic acidosis.   Plan to DC on basal/bolus insulin same as above doses  primary team to clarify if pens vs vials covered and teach appropriate one  - will need RX for insulin PEN with pen needles  or vials + syringes per insurance   also send script for freestyle Chana 14 day sensors (can use phone as reader)  - [facility] f/u @ [facility]  [address] [place] [address], [address], [place], [place] [id number] [phone]      #Lactic acidosis likely due to metformin  - hold metformin inpatient and on discharge  - LA improving off metformin  - cont to trend lactate until normalized    #HTN  goal BP<130/80 (controlled)  continue home ARB    #HLD  continue statin     discussed with primary team ACP    Maria T Jones MD  Division of Endocrinology  Pager: [phone]    If after 6PM or before 9AM, or on weekends/holidays, please call endocrine answering service for assistance ([phone]).  For nonurgent matters email [email or web address] for assistance.

## 2021-07-06 NOTE — PROGRESS NOTE ADULT - PROBLEM SELECTOR PLAN 3
- lactate 5.6-->6.7  - likely due to metformin use  - hold metformin   - IVF, lactate downtrending 6.7-->2.1 On admission, lactate 5.6-->6.7  likely due to metformin use   hold metformin   IVF, lactate downtrending 6.7-->2.1

## 2021-07-06 NOTE — DISCHARGE NOTE NURSING/CASE MANAGEMENT/SOCIAL WORK - PATIENT PORTAL LINK FT
You can access the FollowMyHealth Patient Portal offered by [facility] by registering at the following website: [email or web address]. By joining [facility] [employer]’s FollowMyHealth portal, you will also be able to view your health information using other applications (apps) compatible with our system.

## 2021-07-06 NOTE — PROGRESS NOTE ADULT - PROBLEM SELECTOR PLAN 4
- uncontrolled DM  - prednisone use contributing to elevated FS  - a1c 10.9  - hold home metformin  - endocrine consulted , started on basal bolus insulin lantus  22u hs and premeal 8u actid, ISS  --apprec endo recs,  STOP metformin given lactic acidosis. Plan to DC on basal/bolus vs basal plus oral agent such as DPP4i (ex: Januvia 100 mg daily, Tradjenta 5 mg daily)  -insulin teaching pre-discharge  - [facility] f/u @ [facility]  [address] [place] [address], [address], [place], [place] [id number] [phone] uncontrolled DM  prednisone use contributing to elevated FS  A1c 10.9  hold home metformin given Lactic acidosis   Endocrine on board-  on basal bolus insulin lantus  22u hs and premeal 8u actid, ISS  Apprec endo recs,  STOP metformin given lactic acidosis. Plan to DC on basal/bolus vs basal plus oral agent such as DPP4i (ex: Januvia 100 mg daily, Tradjenta 5 mg daily)  FU insulin teaching pre-discharge  can f/u @ [facility]  [address] [place] [address], [address], [place], [place] [id number] [phone]

## 2021-07-06 NOTE — PROGRESS NOTE ADULT - PROBLEM SELECTOR PLAN 2
- atypical chest pain, denies personal and family history of heart disease  - chronic issue has had cardiac work up outpatient with Dr. Olivera (stress echo unremarkable), pending CT coronaries 7/24  - troponin <6 x1, please call outpatient cardiologist for collateral  - monitor on telemetry Atypical chest pain, denies personal and family history of heart disease  Denying chest pain at present   Chronic issue has had cardiac work up outpatient with Dr. Olivera (stress echo unremarkable), pending CT coronaries 7/24  troponin <6 x1  Fu outpt Cardiology for further work up

## 2021-07-06 NOTE — PROGRESS NOTE ADULT - PROBLEM SELECTOR PLAN 7
- lovenox for ppx Lovenox for ppx  Dispo- DC planning pending Insulin teaching  Email sent to PMD office Dr Burgess for follow up appointment  DC planning 33mins Lovenox for ppx  Dispo- DC planning pending Insulin teaching and authorization   Email sent to PMD office Dr Burgess for follow up appointment  DC planning 33mins Lovenox for ppx  Dispo- DC planning pending Insulin teaching and authorization   Email sent to PMD office Dr Burgess for follow up appointment  Fu Appointment made for August 2nd for 11.20AM  DC planning 33mins

## 2021-07-06 NOTE — PROGRESS NOTE ADULT - PROBLEM SELECTOR PLAN 6
- f/u with Dr Barnes as outpatient  - takes prednisone 15 mg daily at home (was supposed to take 30 mg daily)   - continue prednisone 15 mg for now F/u with Dr Barnes as outpatient  Takes prednisone 15 mg daily at home (was supposed to take 30 mg daily)   Continue prednisone 15 mg for now

## 2021-07-06 NOTE — PROGRESS NOTE ADULT - PROBLEM SELECTOR PLAN 1
- dizziness/lightheaded related to movement, sometimes occurs at rest   - seen by neurologist Dr. Lucas Patel outpatient, thought to be related to hyperventilation  - orthostatic VS negative   - US head/neck on 06/29/2021 normal   - Emailed MRI supervisor to expedite MRI brain non con, although nonfocal symptoms, no finger to nose dysmetria or heel to shin, doubt central infarct  -Dispo: pt can be discharge home if MRI negative dizziness/lightheaded related to movement, sometimes occurs at rest   Symptoms resolved- reports occasional mild dizziness   Seen by neurologist Dr. Lucas Patel outpatient, thought to be related to hyperventilation  Orthostatic VS negative   US head/neck on 06/29/2021 normal   MRI- No evidence of intracranial hemorrhage, acute infarct, or mass effect

## 2021-07-06 NOTE — PROGRESS NOTE ADULT - SUBJECTIVE AND OBJECTIVE BOX
Chief Complaint: DM2    History: tolerating po  no hypoglycemia events or symptoms    MEDICATIONS  (STANDING):  atorvastatin 10 milliGRAM(s) Oral at bedtime  dextrose 40% Gel 15 Gram(s) Oral once  dextrose 50% Injectable 25 Gram(s) IV Push once  enoxaparin Injectable 40 milliGRAM(s) SubCutaneous daily  insulin glargine Injectable (LANTUS) 22 Unit(s) SubCutaneous at bedtime  insulin lispro (ADMELOG) corrective regimen sliding scale   SubCutaneous three times a day before meals  insulin lispro (ADMELOG) corrective regimen sliding scale   SubCutaneous at bedtime  insulin lispro Injectable (ADMELOG) 10 Unit(s) SubCutaneous three times a day before meals  losartan 25 milliGRAM(s) Oral daily  predniSONE   Tablet 15 milliGRAM(s) Oral daily  sodium chloride 0.9%. 1000 milliLiter(s) (75 mL/Hr) IV Continuous <Continuous>    MEDICATIONS  (PRN):  acetaminophen   Tablet .. 650 milliGRAM(s) Oral every 6 hours PRN Temp greater or equal to 38C (100.4F), Mild Pain (1 - 3)      Allergies    No Known Allergies    Intolerances      Review of Systems:  ALL OTHER SYSTEMS REVIEWED AND NEGATIVE      PHYSICAL EXAM:  VITALS: T(C): 36.9 (07-06-21 @ 11:45)  T(F): 98.4 (07-06-21 @ 11:45), Max: 98.4 (07-06-21 @ 11:45)  HR: 93 (07-06-21 @ 11:45) (74 - 93)  BP: 115/81 (07-06-21 @ 11:45) (108/72 - 124/83)  RR:  (16 - 18)  SpO2:  (96% - 100%)  Wt(kg): --  GENERAL: NAD, well-groomed, well-developed  EYES: No proptosis, no lid lag, anicteric  HEENT:  Atraumatic, Normocephalic, moist mucous membranes  RESPIRATORY: nonlabored respirations, no wheezing  PSYCH: Alert and oriented x 3, normal affect, normal mood    CAPILLARY BLOOD GLUCOSE      POCT Blood Glucose.: 255 mg/dL (06 Jul 2021 12:26)  POCT Blood Glucose.: 274 mg/dL (06 Jul 2021 08:42)  POCT Blood Glucose.: 208 mg/dL (05 Jul 2021 21:03)  POCT Blood Glucose.: 177 mg/dL (05 Jul 2021 17:45)      07-06    136  |  101  |  18  ----------------------------<  212<H>  3.9   |  21<L>  |  0.65    EGFR if : 110  EGFR if non : 95    Ca    9.7      07-06  Mg     1.90     07-06  Phos  4.8     07-06        A1C with Estimated Average Glucose Result: 10.9 % (07-03-21 @ 12:35)      Thyroid Function Tests:  07-04 @ 06:23 TSH 0.90 FreeT4 -- T3 -- Anti TPO -- Anti Thyroglobulin Ab -- TSI --

## 2021-07-07 VITALS
RESPIRATION RATE: 17 BRPM | TEMPERATURE: 99 F | SYSTOLIC BLOOD PRESSURE: 111 MMHG | OXYGEN SATURATION: 98 % | HEART RATE: 96 BPM | DIASTOLIC BLOOD PRESSURE: 62 MMHG

## 2021-07-07 LAB
ANION GAP SERPL CALC-SCNC: 14 MMOL/L — SIGNIFICANT CHANGE UP (ref 7–14)
BUN SERPL-MCNC: 19 MG/DL — SIGNIFICANT CHANGE UP (ref 7–23)
CALCIUM SERPL-MCNC: 9.4 MG/DL — SIGNIFICANT CHANGE UP (ref 8.4–10.5)
CHLORIDE SERPL-SCNC: 104 MMOL/L — SIGNIFICANT CHANGE UP (ref 98–107)
CO2 SERPL-SCNC: 20 MMOL/L — LOW (ref 22–31)
CREAT SERPL-MCNC: 0.58 MG/DL — SIGNIFICANT CHANGE UP (ref 0.5–1.3)
GLUCOSE BLDC GLUCOMTR-MCNC: 201 MG/DL — HIGH (ref 70–99)
GLUCOSE BLDC GLUCOMTR-MCNC: 296 MG/DL — HIGH (ref 70–99)
GLUCOSE SERPL-MCNC: 190 MG/DL — HIGH (ref 70–99)
HCT VFR BLD CALC: 44.2 % — SIGNIFICANT CHANGE UP (ref 34.5–45)
HGB BLD-MCNC: 14.4 G/DL — SIGNIFICANT CHANGE UP (ref 11.5–15.5)
MAGNESIUM SERPL-MCNC: 1.9 MG/DL — SIGNIFICANT CHANGE UP (ref 1.6–2.6)
MCHC RBC-ENTMCNC: 30.9 PG — SIGNIFICANT CHANGE UP (ref 27–34)
MCHC RBC-ENTMCNC: 32.6 GM/DL — SIGNIFICANT CHANGE UP (ref 32–36)
MCV RBC AUTO: 94.8 FL — SIGNIFICANT CHANGE UP (ref 80–100)
NRBC # BLD: 0 /100 WBCS — SIGNIFICANT CHANGE UP
NRBC # FLD: 0 K/UL — SIGNIFICANT CHANGE UP
PHOSPHATE SERPL-MCNC: 4.3 MG/DL — SIGNIFICANT CHANGE UP (ref 2.5–4.5)
PLATELET # BLD AUTO: 212 K/UL — SIGNIFICANT CHANGE UP (ref 150–400)
POTASSIUM SERPL-MCNC: 3.9 MMOL/L — SIGNIFICANT CHANGE UP (ref 3.5–5.3)
POTASSIUM SERPL-SCNC: 3.9 MMOL/L — SIGNIFICANT CHANGE UP (ref 3.5–5.3)
RBC # BLD: 4.66 M/UL — SIGNIFICANT CHANGE UP (ref 3.8–5.2)
RBC # FLD: 12.9 % — SIGNIFICANT CHANGE UP (ref 10.3–14.5)
SODIUM SERPL-SCNC: 138 MMOL/L — SIGNIFICANT CHANGE UP (ref 135–145)
WBC # BLD: 5.81 K/UL — SIGNIFICANT CHANGE UP (ref 3.8–10.5)
WBC # FLD AUTO: 5.81 K/UL — SIGNIFICANT CHANGE UP (ref 3.8–10.5)

## 2021-07-07 PROCEDURE — 99239 HOSP IP/OBS DSCHRG MGMT >30: CPT

## 2021-07-07 RX ORDER — INSULIN LISPRO 100/ML
11 VIAL (ML) SUBCUTANEOUS
Qty: 3 | Refills: 0
Start: 2021-07-07 | End: 2021-08-05

## 2021-07-07 RX ADMIN — Medication 11 UNIT(S): at 12:14

## 2021-07-07 RX ADMIN — Medication 6: at 12:13

## 2021-07-07 RX ADMIN — Medication 11 UNIT(S): at 08:43

## 2021-07-07 RX ADMIN — LOSARTAN POTASSIUM 25 MILLIGRAM(S): 100 TABLET, FILM COATED ORAL at 06:51

## 2021-07-07 RX ADMIN — ENOXAPARIN SODIUM 40 MILLIGRAM(S): 100 INJECTION SUBCUTANEOUS at 12:15

## 2021-07-07 RX ADMIN — Medication 4: at 08:43

## 2021-07-07 RX ADMIN — Medication 15 MILLIGRAM(S): at 06:51

## 2021-07-07 NOTE — PROGRESS NOTE ADULT - PROBLEM SELECTOR PLAN 7
Lovenox for ppx  Dispo- DC planning pending Insulin teaching and authorization   Email sent to PMD office Dr Burgess for follow up appointment  Fu Appointment made for August 2nd for 11.20AM  DC planning 33mins Lovenox for ppx  Dispo- DC planning pending Insulin  authorization   Email sent to PMD office Dr Burgess for follow up appointment  Fu Appointment made for August 2nd for 11.20AM  DC planning 33mins

## 2021-07-07 NOTE — PROGRESS NOTE ADULT - PROBLEM SELECTOR PROBLEM 4
Hyperlipidemia, unspecified hyperlipidemia type
Hyperlipidemia, unspecified hyperlipidemia type
Type 2 diabetes mellitus with hyperglycemia, without long-term current use of insulin

## 2021-07-07 NOTE — PROGRESS NOTE ADULT - SUBJECTIVE AND OBJECTIVE BOX
Rea Kelley  Hospitalist  Pager- [phone]      PROGRESS NOTE:     Patient is a 60y old  Female who presents with a chief complaint of lightheaded (05 Jul 2021 11:55)      SUBJECTIVE / OVERNIGHT EVENTS: pt denies chest pain/sob/active dizziness  no new events   Reports improvement in dizziness  ADDITIONAL REVIEW OF SYSTEMS: afebrile      MEDICATIONS  (STANDING):  atorvastatin 10 milliGRAM(s) Oral at bedtime  dextrose 40% Gel 15 Gram(s) Oral once  dextrose 50% Injectable 25 Gram(s) IV Push once  enoxaparin Injectable 40 milliGRAM(s) SubCutaneous daily  insulin glargine Injectable (LANTUS) 28 Unit(s) SubCutaneous at bedtime  insulin lispro (ADMELOG) corrective regimen sliding scale   SubCutaneous three times a day before meals  insulin lispro (ADMELOG) corrective regimen sliding scale   SubCutaneous at bedtime  insulin lispro Injectable (ADMELOG) 11 Unit(s) SubCutaneous three times a day before meals  losartan 25 milliGRAM(s) Oral daily  predniSONE   Tablet 15 milliGRAM(s) Oral daily  sodium chloride 0.9%. 1000 milliLiter(s) (75 mL/Hr) IV Continuous <Continuous>    MEDICATIONS  (PRN):  acetaminophen   Tablet .. 650 milliGRAM(s) Oral every 6 hours PRN Temp greater or equal to 38C (100.4F), Mild Pain (1 - 3)      CAPILLARY BLOOD GLUCOSE      POCT Blood Glucose.: 201 mg/dL (07 Jul 2021 08:35)  POCT Blood Glucose.: 208 mg/dL (06 Jul 2021 21:45)  POCT Blood Glucose.: 114 mg/dL (06 Jul 2021 17:56)  POCT Blood Glucose.: 255 mg/dL (06 Jul 2021 12:26)  I&O's Summary    04 Jul 2021 07:01  -  05 Jul 2021 07:00  --------------------------------------------------------  IN: 350 mL / OUT: 1450 mL / NET: -1100 mL        PHYSICAL EXAM:    Vital Signs Last 24 Hrs  T(C): 36.4 (07 Jul 2021 07:08), Max: 37.1 (06 Jul 2021 18:15)  T(F): 97.6 (07 Jul 2021 07:08), Max: 98.8 (06 Jul 2021 18:15)  HR: 80 (07 Jul 2021 07:08) (80 - 93)  BP: 125/69 (07 Jul 2021 07:08) (115/81 - 130/80)  BP(mean): --  RR: 15 (07 Jul 2021 07:08) (15 - 16)  SpO2: 100% (07 Jul 2021 07:08) (96% - 100%)    CONSTITUTIONAL: NAD, well-developed  RESPIRATORY: Normal respiratory effort; lungs are clear to auscultation bilaterally  CARDIOVASCULAR: Regular rate and rhythm, normal S1 and S2, no murmur/rub/gallop; No lower extremity edema; Peripheral pulses are 2+ bilaterally  ABDOMEN: Nontender to palpation, normoactive bowel sounds, no rebound/guarding; No hepatosplenomegaly  MUSCULOSKELETAL: no clubbing or cyanosis of digits; no joint swelling or tenderness to palpation  PSYCH: A+O to person, place, and time; affect appropriate      LABS:                                                 14.4   5.81  )-----------( 212      ( 07 Jul 2021 07:06 )             44.2     07-07    138  |  104  |  19  ----------------------------<  190<H>  3.9   |  20<L>  |  0.58    Ca    9.4      07 Jul 2021 07:06  Phos  4.3     07-07  Mg     1.90     07-07        RADIOLOGY & ADDITIONAL TESTS:  Results Reviewed:   Imaging Personally Reviewed:  Electrocardiogram Personally Reviewed:    COORDINATION OF CARE:  Care Discussed with Consultants/Other Providers [Y/N]:   Prior or Outpatient Records Reviewed [Y/N]:   Rea Kelley  Hospitalist  Pager- [phone]      PROGRESS NOTE:     Patient is a 60y old  Female who presents with a chief complaint of lightheaded (05 Jul 2021 11:55)      SUBJECTIVE / OVERNIGHT EVENTS: pt denies chest pain/sob/active dizziness  no new events   Reports improvement in dizziness  RN providing Insulin teaching       ADDITIONAL REVIEW OF SYSTEMS: afebrile      MEDICATIONS  (STANDING):  atorvastatin 10 milliGRAM(s) Oral at bedtime  dextrose 40% Gel 15 Gram(s) Oral once  dextrose 50% Injectable 25 Gram(s) IV Push once  enoxaparin Injectable 40 milliGRAM(s) SubCutaneous daily  insulin glargine Injectable (LANTUS) 28 Unit(s) SubCutaneous at bedtime  insulin lispro (ADMELOG) corrective regimen sliding scale   SubCutaneous three times a day before meals  insulin lispro (ADMELOG) corrective regimen sliding scale   SubCutaneous at bedtime  insulin lispro Injectable (ADMELOG) 11 Unit(s) SubCutaneous three times a day before meals  losartan 25 milliGRAM(s) Oral daily  predniSONE   Tablet 15 milliGRAM(s) Oral daily  sodium chloride 0.9%. 1000 milliLiter(s) (75 mL/Hr) IV Continuous <Continuous>    MEDICATIONS  (PRN):  acetaminophen   Tablet .. 650 milliGRAM(s) Oral every 6 hours PRN Temp greater or equal to 38C (100.4F), Mild Pain (1 - 3)      CAPILLARY BLOOD GLUCOSE  POCT Blood Glucose.: 201 mg/dL (07 Jul 2021 08:35)  POCT Blood Glucose.: 208 mg/dL (06 Jul 2021 21:45)  POCT Blood Glucose.: 114 mg/dL (06 Jul 2021 17:56)  POCT Blood Glucose.: 255 mg/dL (06 Jul 2021 12:26)  I&O's Summary    04 Jul 2021 07:01  -  05 Jul 2021 07:00  --------------------------------------------------------  IN: 350 mL / OUT: 1450 mL / NET: -1100 mL        PHYSICAL EXAM:    Vital Signs Last 24 Hrs  T(C): 36.4 (07 Jul 2021 07:08), Max: 37.1 (06 Jul 2021 18:15)  T(F): 97.6 (07 Jul 2021 07:08), Max: 98.8 (06 Jul 2021 18:15)  HR: 80 (07 Jul 2021 07:08) (80 - 93)  BP: 125/69 (07 Jul 2021 07:08) (115/81 - 130/80)  BP(mean): --  RR: 15 (07 Jul 2021 07:08) (15 - 16)  SpO2: 100% (07 Jul 2021 07:08) (96% - 100%)    CONSTITUTIONAL: NAD, well-developed  RESPIRATORY: Normal respiratory effort; lungs are clear to auscultation bilaterally  CARDIOVASCULAR: Regular rate and rhythm, normal S1 and S2, no murmur/rub/gallop; No lower extremity edema; Peripheral pulses are 2+ bilaterally  ABDOMEN: Nontender to palpation, normoactive bowel sounds, no rebound/guarding; No hepatosplenomegaly  MUSCULOSKELETAL: no clubbing or cyanosis of digits; no joint swelling or tenderness to palpation  PSYCH: A+O to person, place, and time; affect appropriate      LABS:                                                 14.4   5.81  )-----------( 212      ( 07 Jul 2021 07:06 )             44.2     07-07    138  |  104  |  19  ----------------------------<  190<H>  3.9   |  20<L>  |  0.58    Ca    9.4      07 Jul 2021 07:06  Phos  4.3     07-07  Mg     1.90     07-07        RADIOLOGY & ADDITIONAL TESTS:  Results Reviewed:   Imaging Personally Reviewed:  Electrocardiogram Personally Reviewed:    COORDINATION OF CARE:  Care Discussed with Consultants/Other Providers [Y/N]:   Prior or Outpatient Records Reviewed [Y/N]:

## 2021-07-07 NOTE — PROGRESS NOTE ADULT - PROBLEM SELECTOR PLAN 3
On admission, lactate 5.6-->6.7  likely due to metformin use   hold metformin   IVF, lactate downtrending 6.7-->2.1

## 2021-07-07 NOTE — PROGRESS NOTE ADULT - PROBLEM SELECTOR PROBLEM 1
Type 2 diabetes mellitus with hyperglycemia, without long-term current use of insulin
Type 2 diabetes mellitus with hyperglycemia, without long-term current use of insulin
Lightheadedness

## 2021-07-07 NOTE — PROGRESS NOTE ADULT - PROBLEM SELECTOR PROBLEM 3
Lactic acidosis
Hypertension, unspecified type
Hypertension, unspecified type
Lactic acidosis

## 2021-07-07 NOTE — PROGRESS NOTE ADULT - PROBLEM SELECTOR PLAN 2
Atypical chest pain, denies personal and family history of heart disease  Denying chest pain at present   Chronic issue has had cardiac work up outpatient with Dr. Olivera (stress echo unremarkable), pending CT coronaries 7/24  troponin <6 x1  Fu outpt Cardiology for further work up

## 2021-07-07 NOTE — PROGRESS NOTE ADULT - PROBLEM SELECTOR PLAN 1
dizziness/lightheaded related to movement, sometimes occurs at rest   Symptoms resolved- reports occasional mild dizziness   Seen by neurologist Dr. Lucas Patel outpatient, thought to be related to hyperventilation  Orthostatic VS negative   US head/neck on 06/29/2021 normal   MRI- No evidence of intracranial hemorrhage, acute infarct, or mass effect

## 2021-07-07 NOTE — PROGRESS NOTE ADULT - PROBLEM SELECTOR PLAN 6
F/u with Dr Barnes as outpatient  Takes prednisone 15 mg daily at home (was supposed to take 30 mg daily)   Continue prednisone 15 mg for now

## 2021-07-07 NOTE — PROGRESS NOTE ADULT - PROBLEM SELECTOR PLAN 4
uncontrolled DM  prednisone use contributing to elevated FS  A1c 10.9  hold home metformin given Lactic acidosis   Endocrine on board-  on basal bolus insulin lantus  22u hs and premeal 8u actid, ISS  Apprec endo recs,  STOP metformin given lactic acidosis. Plan to DC on basal/bolus vs basal plus oral agent such as DPP4i (ex: Januvia 100 mg daily, Tradjenta 5 mg daily)  FU insulin teaching pre-discharge  can f/u @ [facility]  [address] [place] [address], [address], [place], [place] [id number] [phone]

## 2021-07-12 ENCOUNTER — APPOINTMENT (OUTPATIENT)
Dept: GASTROENTEROLOGY | Facility: CLINIC | Age: 61
End: 2021-07-12
Payer: COMMERCIAL

## 2021-07-12 VITALS
WEIGHT: 168 LBS | SYSTOLIC BLOOD PRESSURE: 115 MMHG | HEIGHT: 63 IN | TEMPERATURE: 97.4 F | DIASTOLIC BLOOD PRESSURE: 76 MMHG | HEART RATE: 101 BPM | BODY MASS INDEX: 29.77 KG/M2 | OXYGEN SATURATION: 97 %

## 2021-07-12 DIAGNOSIS — Z86.39 PERSONAL HISTORY OF OTHER ENDOCRINE, NUTRITIONAL AND METABOLIC DISEASE: ICD-10-CM

## 2021-07-12 DIAGNOSIS — R07.89 OTHER CHEST PAIN: ICD-10-CM

## 2021-07-12 DIAGNOSIS — Z83.3 FAMILY HISTORY OF DIABETES MELLITUS: ICD-10-CM

## 2021-07-12 PROCEDURE — 99072 ADDL SUPL MATRL&STAF TM PHE: CPT

## 2021-07-12 PROCEDURE — 99214 OFFICE O/P EST MOD 30 MIN: CPT

## 2021-07-12 PROCEDURE — 99204 OFFICE O/P NEW MOD 45 MIN: CPT

## 2021-07-12 NOTE — ASSESSMENT
[FreeTextEntry1] : Patient presented to the hospital with lightheadedness and was found to have a lactic acidosis.  Metformin was DC'd with improvement in her symptoms.\par Blood work from 2 weeks ago revealed normal LFTs.\par She did have atypical chest pain which has improved with stopping Metformin.  Because she is on 15 mg of prednisone and has a possible history of GERD and esophagitis famotidine 40 mg will be started.\par She apparently had a colonoscopy 2 years ago.  FOBT will be sent to the lab.\par She will be seeing her PMD for evaluation of her periorbital edema.\par She is on insulin for her diabetes.

## 2021-07-12 NOTE — PHYSICAL EXAM
[General Appearance - Alert] : alert [General Appearance - In No Acute Distress] : in no acute distress [Sclera] : the sclera and conjunctiva were normal [PERRL With Normal Accommodation] : pupils were equal in size, round, and reactive to light [Extraocular Movements] : extraocular movements were intact [FreeTextEntry1] : Periorbital edema [Outer Ear] : the ears and nose were normal in appearance [Oropharynx] : the oropharynx was normal [Neck Appearance] : the appearance of the neck was normal [Neck Cervical Mass (___cm)] : no neck mass was observed [Jugular Venous Distention Increased] : there was no jugular-venous distention [Thyroid Diffuse Enlargement] : the thyroid was not enlarged [Thyroid Nodule] : there were no palpable thyroid nodules [Auscultation Breath Sounds / Voice Sounds] : lungs were clear to auscultation bilaterally [Heart Rate And Rhythm] : heart rate was normal and rhythm regular [Heart Sounds] : normal S1 and S2 [Heart Sounds Gallop] : no gallops [Murmurs] : no murmurs [Heart Sounds Pericardial Friction Rub] : no pericardial rub [Bowel Sounds] : normal bowel sounds [Abdomen Soft] : soft [Abdomen Tenderness] : non-tender [] : no hepato-splenomegaly [Abdomen Mass (___ Cm)] : no abdominal mass palpated [No CVA Tenderness] : no ~M costovertebral angle tenderness [No Spinal Tenderness] : no spinal tenderness [Abnormal Walk] : normal gait [Nail Clubbing] : no clubbing  or cyanosis of the fingernails [Musculoskeletal - Swelling] : no joint swelling seen [Motor Tone] : muscle strength and tone were normal [Oriented To Time, Place, And Person] : oriented to person, place, and time [Impaired Insight] : insight and judgment were intact [Affect] : the affect was normal

## 2021-07-12 NOTE — HISTORY OF PRESENT ILLNESS
[FreeTextEntry1] : Patient is a 60-year-old female referred from the emergency room.  She does have a history of sarcoidosis that was diagnosed 2 years ago and she was treated with prednisone.  She presented to the emergency room with lightheadedness and was hospitalized for 2 days.  MRI of the brain was negative and chest x-ray was negative as well.  She has been complaining of atypical chest pain which is sharp and can be severe.  It can awaken her from sleep in the middle of the night.  It lasts for a minute or 2.  It is not postprandial.  She does have some belching but no regurgitation, dysphagia, or heartburn.  Her prednisone was increased to 15 mg and she feels better.  The chest pain is much less severe and improved.\par She was found to have a lactic acidosis in the hospital and her Metformin was DC'd.

## 2021-07-12 NOTE — REVIEW OF SYSTEMS
[Chest Pain] : chest pain [As Noted in HPI] : as noted in HPI [Dizziness] : dizziness [Negative] : Heme/Lymph [FreeTextEntry3] : Periorbital swelling [FreeTextEntry6] : Sarcoidosis

## 2021-07-12 NOTE — REASON FOR VISIT
[Initial Evaluation] : an initial evaluation [FreeTextEntry1] : Lightheadedness, Lactic acidosis, Atypical chest pain

## 2021-07-14 ENCOUNTER — APPOINTMENT (OUTPATIENT)
Dept: INTERNAL MEDICINE | Facility: CLINIC | Age: 61
End: 2021-07-14
Payer: COMMERCIAL

## 2021-07-14 VITALS
DIASTOLIC BLOOD PRESSURE: 80 MMHG | WEIGHT: 160 LBS | SYSTOLIC BLOOD PRESSURE: 110 MMHG | HEART RATE: 96 BPM | TEMPERATURE: 97.1 F | BODY MASS INDEX: 28.35 KG/M2 | RESPIRATION RATE: 14 BRPM | OXYGEN SATURATION: 97 % | HEIGHT: 63 IN

## 2021-07-14 DIAGNOSIS — E87.2 ACIDOSIS: ICD-10-CM

## 2021-07-14 PROCEDURE — 99072 ADDL SUPL MATRL&STAF TM PHE: CPT

## 2021-07-14 PROCEDURE — 36415 COLL VENOUS BLD VENIPUNCTURE: CPT

## 2021-07-14 PROCEDURE — 99214 OFFICE O/P EST MOD 30 MIN: CPT

## 2021-07-14 NOTE — PHYSICAL EXAM
[Normal Outer Ear/Nose] : the outer ears and nose were normal in appearance [Normal TMs] : both tympanic membranes were normal [No Lymphadenopathy] : no lymphadenopathy [Supple] : supple [Thyroid Normal, No Nodules] : the thyroid was normal and there were no nodules present [Normal] : normal rate, regular rhythm, normal S1 and S2 and no murmur heard [No Edema] : there was no peripheral edema [de-identified] : Facial swelling R>L, no sinus tenderness [de-identified] : Tenderness over left neck

## 2021-07-14 NOTE — REVIEW OF SYSTEMS
[Fatigue] : fatigue [Recent Change In Weight] : ~T recent weight change [Nasal Discharge] : nasal discharge [Chest Pain] : chest pain [Joint Pain] : joint pain [Dizziness] : dizziness [Negative] : Psychiatric [Fever] : no fever [Chills] : no chills [Pain] : no pain [Redness] : no redness [Vision Problems] : no vision problems [Earache] : no earache [Hearing Loss] : no hearing loss [Hoarseness] : no hoarseness [Sore Throat] : no sore throat [Shortness Of Breath] : no shortness of breath [Wheezing] : no wheezing [Cough] : no cough [Dyspnea on Exertion] : no dyspnea on exertion [Abdominal Pain] : no abdominal pain [Nausea] : no nausea [Constipation] : no constipation [Melena] : no melena [Dysuria] : no dysuria [Joint Stiffness] : no joint stiffness [Joint Swelling] : no joint swelling [Itching] : no itching [Skin Rash] : no skin rash [FreeTextEntry3] : As above [FreeTextEntry4] : As above. [FreeTextEntry5] : 5/10 improved. As above [FreeTextEntry9] : bl knee pain

## 2021-07-14 NOTE — HISTORY OF PRESENT ILLNESS
[FreeTextEntry1] : Facial swelling [de-identified] : Went to ER on 7/5 and was hospitalized x2d for CP and lightheadedness - Found to have lactic acidosis, Metformin was d/c'd\par \par Facial swelling - R>L for 1.5mo now,  \par  - CT: mild sinusitis, no cysts. MRI in ER was NML, U/S head+neck: found decreased size of left parotid gland. \par Symptoms: + headache on right side, progressive swelling R towards Left. Feels "the eyes are swollen", some tearing, some cough/throat clearing, no mucus production, present before facial swelling, right dependent nasal congestion. +Fatigue and weakness.\par Denies SOB, CP, sharp, now improved 5/10 starts on her right neck towards the middle of her chest. No changes in diet, lifestyle that she can assoc. w/swelling. No visual or hearing changes. \par \par - Sarcoidosis: Currently on 15mg of Prednisone, was recommended 30mg, but she did not increase.\par - Uncontrolled BS: Metformin was d/c, now fasting BS around 180mg/dL, and as high as >400mg/dL\par    Only on Insulin Lantus: 28Units QHS, Humalog 11 Units with each meal. \par    Endo appt for Feb 2022!!

## 2021-07-14 NOTE — PLAN
[FreeTextEntry1] : Facial swelling:\par - Imaging and labs reviewed\par - Labs ordered: CRP, ESR, CMP, CBC\par - Pt has upcoming Rheumatology appt for the end of the mo\par - F/U after Rheum visit\par \par DM2 - poorly controlled:\par - Off of Metformin due to lactic acidosis\par - Started Januvia 100mg daily\par - Increased Lantus to 33 Units qhs\par - Changed form Humalog 11Units to a sliding scale w/each meal\par - Freestyle aditya given increased use\par - Referred to (Endo) for a sooner appt.\par \par Sarcoidosis:\par - Cont Pred 15mg\par - f/up with Dr Barnes to reconsider 30mg dosage

## 2021-07-15 LAB
ALBUMIN SERPL ELPH-MCNC: 4.2 G/DL
ALP BLD-CCNC: 91 U/L
ALT SERPL-CCNC: 35 U/L
ANION GAP SERPL CALC-SCNC: 15 MMOL/L
AST SERPL-CCNC: 18 U/L
BASOPHILS # BLD AUTO: 0.02 K/UL
BASOPHILS NFR BLD AUTO: 0.3 %
BILIRUB SERPL-MCNC: 0.5 MG/DL
BUN SERPL-MCNC: 18 MG/DL
CALCIUM SERPL-MCNC: 9.6 MG/DL
CHLORIDE SERPL-SCNC: 102 MMOL/L
CO2 SERPL-SCNC: 22 MMOL/L
CREAT SERPL-MCNC: 0.62 MG/DL
CRP SERPL-MCNC: 8 MG/L
EOSINOPHIL # BLD AUTO: 0.09 K/UL
EOSINOPHIL NFR BLD AUTO: 1.3 %
ERYTHROCYTE [SEDIMENTATION RATE] IN BLOOD BY WESTERGREN METHOD: 13 MM/HR
GLUCOSE SERPL-MCNC: 192 MG/DL
HCT VFR BLD CALC: 44.3 %
HGB BLD-MCNC: 13.6 G/DL
IMM GRANULOCYTES NFR BLD AUTO: 0.4 %
LYMPHOCYTES # BLD AUTO: 1.49 K/UL
LYMPHOCYTES NFR BLD AUTO: 21.7 %
MAN DIFF?: NORMAL
MCHC RBC-ENTMCNC: 30.7 GM/DL
MCHC RBC-ENTMCNC: 31.5 PG
MCV RBC AUTO: 102.5 FL
MONOCYTES # BLD AUTO: 0.7 K/UL
MONOCYTES NFR BLD AUTO: 10.2 %
NEUTROPHILS # BLD AUTO: 4.53 K/UL
NEUTROPHILS NFR BLD AUTO: 66.1 %
PLATELET # BLD AUTO: 225 K/UL
POTASSIUM SERPL-SCNC: 4.6 MMOL/L
PROT SERPL-MCNC: 6.6 G/DL
RBC # BLD: 4.32 M/UL
RBC # FLD: 13.5 %
SODIUM SERPL-SCNC: 139 MMOL/L
WBC # FLD AUTO: 6.86 K/UL

## 2021-07-16 ENCOUNTER — APPOINTMENT (OUTPATIENT)
Dept: OTOLARYNGOLOGY | Facility: CLINIC | Age: 61
End: 2021-07-16
Payer: COMMERCIAL

## 2021-07-16 VITALS
HEIGHT: 63 IN | TEMPERATURE: 96.1 F | DIASTOLIC BLOOD PRESSURE: 82 MMHG | WEIGHT: 163 LBS | BODY MASS INDEX: 28.88 KG/M2 | SYSTOLIC BLOOD PRESSURE: 128 MMHG | HEART RATE: 81 BPM

## 2021-07-16 DIAGNOSIS — K11.20 SIALOADENITIS, UNSPECIFIED: ICD-10-CM

## 2021-07-16 DIAGNOSIS — J34.3 HYPERTROPHY OF NASAL TURBINATES: ICD-10-CM

## 2021-07-16 DIAGNOSIS — R60.9 EDEMA, UNSPECIFIED: ICD-10-CM

## 2021-07-16 DIAGNOSIS — R22.0 LOCALIZED SWELLING, MASS AND LUMP, HEAD: ICD-10-CM

## 2021-07-16 DIAGNOSIS — J34.2 DEVIATED NASAL SEPTUM: ICD-10-CM

## 2021-07-16 PROCEDURE — 99214 OFFICE O/P EST MOD 30 MIN: CPT

## 2021-07-19 ENCOUNTER — NON-APPOINTMENT (OUTPATIENT)
Age: 61
End: 2021-07-19

## 2021-07-19 ENCOUNTER — APPOINTMENT (OUTPATIENT)
Dept: ENDOCRINOLOGY | Facility: CLINIC | Age: 61
End: 2021-07-19
Payer: COMMERCIAL

## 2021-07-19 VITALS
HEIGHT: 63 IN | TEMPERATURE: 97.2 F | DIASTOLIC BLOOD PRESSURE: 87 MMHG | SYSTOLIC BLOOD PRESSURE: 131 MMHG | BODY MASS INDEX: 29.41 KG/M2 | HEART RATE: 72 BPM | OXYGEN SATURATION: 96 % | WEIGHT: 166 LBS

## 2021-07-19 DIAGNOSIS — Z13.820 ENCOUNTER FOR SCREENING FOR OSTEOPOROSIS: ICD-10-CM

## 2021-07-19 PROBLEM — R60.9 PAROTID SWELLING: Status: ACTIVE | Noted: 2021-05-21

## 2021-07-19 PROBLEM — J34.3 HYPERTROPHY OF INFERIOR NASAL TURBINATE: Status: ACTIVE | Noted: 2019-04-04

## 2021-07-19 LAB — HBA1C MFR BLD HPLC: 10.3

## 2021-07-19 PROCEDURE — 99215 OFFICE O/P EST HI 40 MIN: CPT | Mod: 25

## 2021-07-19 PROCEDURE — 99205 OFFICE O/P NEW HI 60 MIN: CPT | Mod: 25

## 2021-07-19 PROCEDURE — 99072 ADDL SUPL MATRL&STAF TM PHE: CPT

## 2021-07-19 PROCEDURE — 83036 HEMOGLOBIN GLYCOSYLATED A1C: CPT | Mod: QW

## 2021-07-19 RX ORDER — BLOOD-GLUCOSE METER
70 EACH MISCELLANEOUS
Qty: 2 | Refills: 3 | Status: ACTIVE | COMMUNITY
Start: 2021-07-19 | End: 1900-01-01

## 2021-07-19 RX ORDER — METFORMIN HYDROCHLORIDE 1000 MG/1
1000 TABLET, COATED ORAL
Qty: 180 | Refills: 3 | Status: DISCONTINUED | COMMUNITY
End: 2021-07-19

## 2021-07-19 RX ORDER — INSULIN LISPRO 100 [IU]/ML
INJECTION, SOLUTION INTRAVENOUS; SUBCUTANEOUS
Refills: 0 | Status: DISCONTINUED | COMMUNITY
End: 2021-07-19

## 2021-07-19 RX ORDER — SITAGLIPTIN 100 MG/1
100 TABLET, FILM COATED ORAL DAILY
Qty: 30 | Refills: 5 | Status: DISCONTINUED | COMMUNITY
Start: 2021-07-14 | End: 2021-07-19

## 2021-07-19 NOTE — REVIEW OF SYSTEMS
[Decreased Appetite] : appetite not decreased [Dysphagia] : no dysphagia [Dysphonia] : no dysphonia [Chest Pain] : no chest pain [Palpitations] : no palpitations [Shortness Of Breath] : no shortness of breath [Nausea] : no nausea [Headaches] : no headaches [Dizziness] : no dizziness [Tremors] : no tremors [Pain/Numbness of Digits] : no pain/numbness of digits

## 2021-07-19 NOTE — ASSESSMENT
[Diabetes Foot Care] : diabetes foot care [Long Term Vascular Complications] : long term vascular complications of diabetes [Carbohydrate Consistent Diet] : carbohydrate consistent diet [Importance of Diet and Exercise] : importance of diet and exercise to improve glycemic control, achieve weight loss and improve cardiovascular health [Exercise/Effect on Glucose] : exercise/effect on glucose [Hypoglycemia Management] : hypoglycemia management [Action and use of Insulin] : action and use of short and long-acting insulin [Self Monitoring of Blood Glucose] : self monitoring of blood glucose [Insulin Self-Administration] : insulin self-administration [Injection Technique, Storage, Sharps Disposal] : injection technique, storage, and sharps disposal [Retinopathy Screening] : Patient was referred to ophthalmology for retinopathy screening [FreeTextEntry1] : Patient is a 61 yo woman with uncontrolled type 2 diabetes, on prednisone for sarcoidosis, thyroid nodules establishing endocrine care today. \par \par 1. Uncontrolled Type 2 DM\par -discussed the risks of micro/macrovascular events including, but not limited to, heart attack/stroke/eye complications/kidney disease at length\par -importance of glycemic control discussed; goal A1c of 7-7.5%\par -nutritional counseling recommended; tasked [address] to schedule. Discussed healthy plate diet today\par -importance of self-monitoring of blood glucose also discussed.  Goal fasting glucose 100-130 with 2 hour post-prandial goals < 180\par -dilated eye exam required; ophthalmology referral provided\par -monofilament testing done\par -patient presented to [facility] with lactic acidosis and all oral medicines were stopped. Her POCT A1c is 10.3% and she is on prednisone 15 mg.  Fingersticks are usually in the 200-400s\par -for now, basal/bolus insulin only\par -continue with lantus 33 units, increase humalog to 24 units TID with meals.  Discussed that if steroids are tapered, insulin requirements will likely decrease so she is to let me know what/when the steroid taper is going to be\par -all Rx were provided today\par \par 2.Thyroid nodules\par -repeat thyroid US February 2022\par -clinically and chemically euthyroid\par -TSH in [place] above\par \par 3. HLD\par -LDL goal < 70, her LDL in [place] is 63\par \par 4. Osteoporosis\par -requires screening\par -risk factors include T2DM and high dose steroids for the past 1 year\par \par Follow up in 2-3 months. Call with hypo/hyperglycemic excursions. I will manage patient until her visit in [place] is established. She was unable to get sooner appointment than February and no longer works in [place].\par

## 2021-07-19 NOTE — HISTORY OF PRESENT ILLNESS
[FreeTextEntry1] : Patient is a 59 yo woman with uncontrolled T2DM (A1c 10.9%) and thyroid nodules establishing endocrine care today.\par \par Diagnosed with diabetes around 2017, managed by PCP.  Complications include neuropathy. Patient was diagnosed with sarcoidosis and started on prednisone 1 year ago. Currently takes prednisone 15 mg daily.  No plans to taper at this time but will follow up with pulm shortly\par She was admitted to [facility] in July for hyperglycemia and lactic acidosis.  Presenting symptoms were chest pain and light headedness.  During this admission, she was seen by endocrinology and metformin was stopped.  Patient was discharged on basal/bolus insulin.\par Currently adheres to humalog 11 TID with meals and Lantus 33 units at bedtime; prednisone 15 mg po daily\par Checks sugars 4 times a day\par AM: 160s\par Afternoon: 260-300s\par Yesterday sugar was 404 before dinner\par Breakfast: oatmeal, eggs, avocado\par Lunch: salad, used to eat a lot of rice and flour based products.  Now eating more vegetables.  Salad is purchased outside\par Dinner: salad as well\par No snacks\par No soda and no juice\par Dilated eye exam: over one year ago\par Patient is from [facility] and both parents  from diabetes\par \par Thyroid nodules: see data results below.  Subcentimeter thyroid nodules that do not meet criteria for USGNFA\par July 3, 2021\par [place] lab results\par Serum A1c 10.9%\par TSH 0.90\par LDL 63

## 2021-07-19 NOTE — REASON FOR VISIT
[Initial Evaluation] : an initial evaluation [DM Type 2] : DM Type 2 [FreeTextEntry2] : Dr. Deepak Olivera

## 2021-07-19 NOTE — CONSULT LETTER
[Dear  ___] : Dear  [unfilled], [Consult Letter:] : I had the pleasure of evaluating your patient, [unfilled]. [Please see my note below.] : Please see my note below. [Consult Closing:] : Thank you very much for allowing me to participate in the care of this patient.  If you have any questions, please do not hesitate to contact me. [Sincerely,] : Sincerely, [FreeTextEntry3] : Amita Sauceda MD

## 2021-07-19 NOTE — PHYSICAL EXAM
[Alert] : alert [Well Nourished] : well nourished [No Acute Distress] : no acute distress [EOMI] : extra ocular movement intact [Normal Hearing] : hearing was normal [No Respiratory Distress] : no respiratory distress [No Accessory Muscle Use] : no accessory muscle use [Clear to Auscultation] : lungs were clear to auscultation bilaterally [Normal S1, S2] : normal S1 and S2 [Normal Rate] : heart rate was normal [Normal Bowel Sounds] : normal bowel sounds [Not Tender] : non-tender [Soft] : abdomen soft [Normal Gait] : normal gait [Normal Strength/Tone] : muscle strength and tone were normal [Right Foot Was Examined] : right foot ~C was examined [Left Foot Was Examined] : left foot ~C was examined [Normal] : normal [2+] : 2+ in the dorsalis pedis [Normal Affect] : the affect was normal [Normal Insight/Judgement] : insight and judgment were intact [Normal Mood] : the mood was normal [Foot Ulcers] : no foot ulcers [Swelling] : not swollen [Erythema] : not erythematous [#1 Diminished] : number 1 was normal [#2 Diminished] : number 2 was normal [#3 Diminished] : number 3 was normal [#4 Diminished] : number 4 was normal [#5 Diminished] : number 5 was normal [#6 Diminished] : number 6 was normal [#7 Diminished] : number 7 was normal [#8 Diminished] : number 8 was normal [#9 Diminished] : number 9 was normal [#10 Diminished] : number 10 was normal

## 2021-07-20 ENCOUNTER — RESULT REVIEW (OUTPATIENT)
Age: 61
End: 2021-07-20

## 2021-07-20 ENCOUNTER — APPOINTMENT (OUTPATIENT)
Dept: RADIOLOGY | Facility: IMAGING CENTER | Age: 61
End: 2021-07-20
Payer: COMMERCIAL

## 2021-07-20 ENCOUNTER — OUTPATIENT (OUTPATIENT)
Dept: OUTPATIENT SERVICES | Facility: HOSPITAL | Age: 61
LOS: 1 days | End: 2021-07-20
Payer: COMMERCIAL

## 2021-07-20 DIAGNOSIS — Z98.890 OTHER SPECIFIED POSTPROCEDURAL STATES: Chronic | ICD-10-CM

## 2021-07-20 DIAGNOSIS — Z00.8 ENCOUNTER FOR OTHER GENERAL EXAMINATION: ICD-10-CM

## 2021-07-20 DIAGNOSIS — Z41.9 ENCOUNTER FOR PROCEDURE FOR PURPOSES OTHER THAN REMEDYING HEALTH STATE, UNSPECIFIED: Chronic | ICD-10-CM

## 2021-07-20 PROCEDURE — 77080 DXA BONE DENSITY AXIAL: CPT | Mod: 26

## 2021-07-20 PROCEDURE — 77080 DXA BONE DENSITY AXIAL: CPT

## 2021-07-20 RX ORDER — FLASH GLUCOSE SENSOR
KIT MISCELLANEOUS
Qty: 6 | Refills: 3 | Status: DISCONTINUED | COMMUNITY
Start: 2021-07-14 | End: 2021-07-20

## 2021-07-20 RX ORDER — FLASH GLUCOSE SENSOR
KIT MISCELLANEOUS
Qty: 1 | Refills: 0 | Status: DISCONTINUED | COMMUNITY
Start: 2021-07-14 | End: 2021-07-20

## 2021-07-21 ENCOUNTER — OUTPATIENT (OUTPATIENT)
Dept: OUTPATIENT SERVICES | Facility: HOSPITAL | Age: 61
LOS: 1 days | End: 2021-07-21
Payer: COMMERCIAL

## 2021-07-21 ENCOUNTER — APPOINTMENT (OUTPATIENT)
Dept: CT IMAGING | Facility: HOSPITAL | Age: 61
End: 2021-07-21

## 2021-07-21 DIAGNOSIS — Z41.9 ENCOUNTER FOR PROCEDURE FOR PURPOSES OTHER THAN REMEDYING HEALTH STATE, UNSPECIFIED: Chronic | ICD-10-CM

## 2021-07-21 DIAGNOSIS — Z98.890 OTHER SPECIFIED POSTPROCEDURAL STATES: Chronic | ICD-10-CM

## 2021-07-21 PROCEDURE — 75574 CT ANGIO HRT W/3D IMAGE: CPT

## 2021-07-21 PROCEDURE — 75574 CT ANGIO HRT W/3D IMAGE: CPT | Mod: 26

## 2021-07-21 RX ORDER — INSULIN GLARGINE 100 [IU]/ML
100 INJECTION, SOLUTION SUBCUTANEOUS
Qty: 5 | Refills: 3 | Status: DISCONTINUED | COMMUNITY
Start: 2021-07-19 | End: 2021-07-21

## 2021-07-21 RX ORDER — INSULIN GLARGINE 100 [IU]/ML
100 INJECTION, SOLUTION SUBCUTANEOUS
Qty: 1 | Refills: 3 | Status: DISCONTINUED | COMMUNITY
End: 2021-07-21

## 2021-07-29 ENCOUNTER — RESULT REVIEW (OUTPATIENT)
Age: 61
End: 2021-07-29

## 2021-07-29 ENCOUNTER — APPOINTMENT (OUTPATIENT)
Dept: RHEUMATOLOGY | Facility: CLINIC | Age: 61
End: 2021-07-29
Payer: COMMERCIAL

## 2021-07-29 ENCOUNTER — OUTPATIENT (OUTPATIENT)
Dept: OUTPATIENT SERVICES | Facility: HOSPITAL | Age: 61
LOS: 1 days | End: 2021-07-29
Payer: COMMERCIAL

## 2021-07-29 VITALS
BODY MASS INDEX: 29.23 KG/M2 | HEART RATE: 80 BPM | RESPIRATION RATE: 16 BRPM | OXYGEN SATURATION: 98 % | HEIGHT: 63 IN | SYSTOLIC BLOOD PRESSURE: 107 MMHG | WEIGHT: 165 LBS | DIASTOLIC BLOOD PRESSURE: 72 MMHG | TEMPERATURE: 97.7 F

## 2021-07-29 DIAGNOSIS — B19.10 UNSPECIFIED VIRAL HEPATITIS B W/OUT HEPATIC COMA: ICD-10-CM

## 2021-07-29 DIAGNOSIS — Z41.9 ENCOUNTER FOR PROCEDURE FOR PURPOSES OTHER THAN REMEDYING HEALTH STATE, UNSPECIFIED: Chronic | ICD-10-CM

## 2021-07-29 DIAGNOSIS — M25.561 PAIN IN RIGHT KNEE: ICD-10-CM

## 2021-07-29 DIAGNOSIS — Z98.890 OTHER SPECIFIED POSTPROCEDURAL STATES: Chronic | ICD-10-CM

## 2021-07-29 DIAGNOSIS — M17.11 UNILATERAL PRIMARY OSTEOARTHRITIS, RIGHT KNEE: ICD-10-CM

## 2021-07-29 PROCEDURE — 99205 OFFICE O/P NEW HI 60 MIN: CPT

## 2021-07-29 PROCEDURE — 73564 X-RAY EXAM KNEE 4 OR MORE: CPT

## 2021-07-29 PROCEDURE — 73564 X-RAY EXAM KNEE 4 OR MORE: CPT | Mod: 26,50

## 2021-07-29 NOTE — REVIEW OF SYSTEMS
[Feeling Tired] : feeling tired [Dry Eyes] : dryness of the eyes [Nasal Discharge] : nasal discharge [Chest Pain] : chest pain [Arthralgias] : arthralgias [Swollen Glands In The Neck] : swollen glands in the neck [Negative] : Integumentary [Fever] : no fever [Chills] : no chills [Recent Weight Gain (___ Lbs)] : no recent weight gain [Recent Weight Loss (___ Lbs)] : no recent weight loss [Eye Pain] : no eye pain [Red Eyes] : eyes not red [Eyes Itch] : no itching of the eyes [Joint Pain] : no joint pain [Joint Swelling] : no joint swelling [Joint Stiffness] : no joint stiffness [Limb Pain] : no limb pain [Limb Swelling] : no limb swelling [Easy Bleeding] : no tendency for easy bleeding [Easy Bruising] : no tendency for easy bruising

## 2021-07-29 NOTE — ASSESSMENT
[FreeTextEntry1] : 59 yo woman with uncontrolled T2DM (A1c 10.9%) with diabetic neuropathy and thyroid nodules, Hep B core Ab positive \par Sarcoidosis, diagnosed in 2019, when she presented with 30Ibs weight loss for 3 months,  CXR showed adenopathy, and mediastinoscopy showed noncaseating granulomas. Additional symptoms include muscle cramps, dry mouth, change in taste, fatigue,  chest discomfort. No vision changes. No arthritis/arthralgia. No EN. No fever but did have chills at the beginning. She was started pred 30mg and Dr Gayle started HBV ppx, as per note but pt states she does not remember taking any prophylaxis\par Was on prednisone until tapered off by Aug 2020 and again restarted and currently on prednisone 15mg since March as ptr was experiencing some chest tightness and though due to sarcoidosis flare.  \par She had recent work up such as CT angio coronaries- where noted mildly prominent b/l mediastinal lymph nodes, significantly decreased in size compare to prior study. \par CAD: MIld to moderate stenosis of in proximal :AD, proximal LCX, OM1 and mid RCA \par \par 1. Sarcoidosis: biopsy proven- on chronic steroids \par Given hx of poorly controlled DM and risk for Osteoporosis from chronic steroid use we talked about starting treatment with steroid sparing agents- DMARDs such as MTX and start gradual prednisone taper if ok with Pulm \par \par MTX side effects discussed\par Gastrointestinal problems, such as nausea, stomach upset, and loose stools\par Stomatitis or soreness of the mouth\par Abnormal liver chemistries, which are typically mild elevations in hepatic transaminases \par A macular punctate cutaneous eruption, which usually occurs on the extremities, often affecting the elbows and knees, but sparing the trunk\par Central nervous system symptoms, including headache, fatigue, malaise, or impaired ability to concentrate\par Alopecia\par Fever, which is drug-related, although fever can also occur due to infection \par Hematologic abnormalities, particularly macrocytosis, in addition to infrequent but severe myelosuppression \par occasionally MTX induced lung disease. \par \par Will start MTX 7.5mg po q weekly along with folic acid daily and start gradual steroid taper. \par Pt will come in 3 week to check f/u MTX monitoring labs, will check Hep B PCR as well \par \par 2. Hep B core Ab; saw Dr. Gayle and she supposed to be on tenofovir for prophylaxis, but pt states never took it. \par \par 3. Bone health \par I reviewed the DXA done on  July 21, 2021- normal bone density. \par \par 4.. CAD: pt to follow up with cardiology \par \par f/u in 1 month\par

## 2021-07-29 NOTE — PHYSICAL EXAM
[General Appearance - Alert] : alert [General Appearance - In No Acute Distress] : in no acute distress [General Appearance - Well Nourished] : well nourished [General Appearance - Well Developed] : well developed [General Appearance - Well-Appearing] : healthy appearing [Sclera] : the sclera and conjunctiva were normal [Examination Of The Oral Cavity] : the lips and gums were normal [Oropharynx] : the oropharynx was normal [Neck Appearance] : the appearance of the neck was normal [Neck Cervical Mass (___cm)] : no neck mass was observed [Jugular Venous Distention Increased] : there was no jugular-venous distention [Thyroid Diffuse Enlargement] : the thyroid was not enlarged [Respiration, Rhythm And Depth] : normal respiratory rhythm and effort [Exaggerated Use Of Accessory Muscles For Inspiration] : no accessory muscle use [Auscultation Breath Sounds / Voice Sounds] : lungs were clear to auscultation bilaterally [Heart Rate And Rhythm] : heart rate was normal and rhythm regular [Heart Sounds] : normal S1 and S2 [Murmurs] : no murmurs [Heart Sounds Pericardial Friction Rub] : no pericardial rub [Edema] : there was no peripheral edema [Abdomen Soft] : soft [Abdomen Tenderness] : non-tender [Cervical Lymph Nodes Enlarged Posterior Bilaterally] : posterior cervical [Cervical Lymph Nodes Enlarged Anterior Bilaterally] : anterior cervical [No CVA Tenderness] : no ~M costovertebral angle tenderness [No Spinal Tenderness] : no spinal tenderness [Abnormal Walk] : normal gait [Nail Clubbing] : no clubbing  or cyanosis of the fingernails [Musculoskeletal - Swelling] : no joint swelling seen [Motor Tone] : muscle strength and tone were normal [Skin Color & Pigmentation] : normal skin color and pigmentation [Skin Turgor] : normal skin turgor [] : no rash [Skin Lesions] : no skin lesions [Motor Exam] : the motor exam was normal [Oriented To Time, Place, And Person] : oriented to person, place, and time [FreeTextEntry1] : R knee tender, no synovitis or limited ROM

## 2021-07-29 NOTE — HISTORY OF PRESENT ILLNESS
[Fatigue] : fatigue [Dry Mouth] : dry mouth [Arthralgias] : arthralgias [Difficulty Walking] : difficulty walking [Muscle Weakness] : muscle weakness [Muscle Spasms] : muscle spasms [Muscle Cramping] : muscle cramping [Dry Eyes] : dry eyes [FreeTextEntry1] : Referred by Dr. Parham for Rheumatology consultation \par \par \par 59 yo woman with uncontrolled T2DM (A1c 10.9%) with diabetic neuropathy and thyroid nodules\par Sarcoidosis, diagnosed in 2019, when she presented with 30Ibs weight loss for 3 months,  CXR showed adenopathy, and mediastinoscopy showed noncaseating granulomas. Additional symptoms include muscle cramps, dry mouth, change in taste, fatigue,  chest discomfort. No vision changes. No arthritis/arthralgia. No EN. No fever but did have chills at the beginning. She was started pred 30mg and Dr Gayle started HBV ppx, as per note but pt states she does not remember taking any prophylaxis\par Was on prednisone until tapered off by Aug 2020 and again restarted and currently on prednisone 15mg since March as ptr was experiencing some chest tightness and though due to sarcoidosis flare. \par Pt follows with ENT Dr. Shari Parham for  chronic right facial swelling.  Treated w/ Augmentin for sinusitis in early April 2021. Found parotid swelling and LAD- likely related to Sarcoidosis. \par Pt has never tried any steroid sparing agents in the past and never seen Rheumatologist. \par Pt reports arthralgia of elbows and knees, but denies joint swelling, AM stiffness \par Denies skin rash. \par She had recent work up such as CT angio coronaries- where noted mildly prominent b/l mediastinal lymph nodes, significantly decreased in size compare to prior study. \par MIld to moderate stenosis of in proximal :AD, proximal LCX, OM1 and mid RCA \par She had recent hospitalization for metformin induced lactic acidosis. \par  [Anorexia] : no anorexia [Weight Loss] : no weight loss [Malaise] : no malaise [Fever] : no fever [Chills] : no chills [Depression] : no depression [Malar Facial Rash] : no malar facial rash [Skin Lesions] : no lesions [Skin Nodules] : no skin nodules [Oral Ulcers] : no oral ulcers [Cough] : no cough [Dysphonia] : no dysphonia [Dysphagia] : no dysphagia [Shortness of Breath] : no shortness of breath [Chest Pain] : no chest pain [Joint Swelling] : no joint swelling [Joint Warmth] : no joint warmth [Joint Deformity] : no joint deformity [Decreased ROM] : no decreased range of motion [Morning Stiffness] : no morning stiffness [Falls] : no falls [Dyspnea] : no dyspnea [Myalgias] : no myalgias [Visual Changes] : no visual changes [Eye Pain] : no eye pain [Eye Redness] : no eye redness

## 2021-07-30 ENCOUNTER — APPOINTMENT (OUTPATIENT)
Dept: ENDOCRINOLOGY | Facility: CLINIC | Age: 61
End: 2021-07-30
Payer: COMMERCIAL

## 2021-07-30 PROBLEM — J34.2 DEVIATED NASAL SEPTUM: Status: ACTIVE | Noted: 2019-04-04

## 2021-07-30 PROBLEM — R22.0 FACIAL SWELLING: Status: ACTIVE | Noted: 2021-07-30

## 2021-07-30 PROBLEM — K11.20 SIALOADENITIS OF SUBMANDIBULAR GLAND: Status: ACTIVE | Noted: 2021-07-30

## 2021-07-30 PROCEDURE — 97802 MEDICAL NUTRITION INDIV IN: CPT

## 2021-07-30 NOTE — ASSESSMENT
[FreeTextEntry1] : Ms. POWELL was evaluated for the following issues today:\par \par 1.) Lymphadenopathy likely sarcoid\par --> warm compress over lymph nodes\par 2.) right >> left parotid and facial swelling does not appear infectious or sinus-related. May be related to sarcoid\par --> f/u with rheumatology on 7/29\par 3.) Tender right submandibular gland with no evidence of infection or stone\par --> warm compresses and increase hydration\par 4.) Right dependent nasal congestion is due to inferior turbinate edema. Turbinate can be reduced in size in future to improve breathing.  Procedure on hold while she continue Rheum w/up\par --> continue fluticasone nasal spray\par --> continue saline spray\par \par Return in 6 months

## 2021-07-30 NOTE — HISTORY OF PRESENT ILLNESS
[de-identified] : Ms. POWELL presents for follow up.\par She was in the hospitalized at [facility] from Saturday 7/10-7/14/21 for lactic acid buildup from Metformin. She thinks the chest pain was due to the lactic acid. The chest pain is almost gone. She is off metformin and now on insulin\par Facial swelling still present, feels it is a little worse.  She feels like she has swelling on the left side now too.\par She is scheduled for Rheumatology on 7/29.\par Nasal congestion is not improved with fluticasone;  now causing her to snore.  Congestion worse on right dependent.\par Hx of pulmonary sarcoidosis, confirmed after mediastinoscopy and LN bx showed noncaseating granulomas in 2019. Was on prednisone until tapered off by Aug 2020.\par Hx of thyroid nodules, last US in Feb 2021.\par She does not feel any lump in neck or throat pain.\par On prelim US for FNA left thyroid nodule 1.9 cm in September 2019, no thyroid nodule seen, so no biopsy was done.\par USG FNA of left thyroid nodule twice was nondiagnostic (6/13/2019 and 7/03/2019) \par Had left parotid mass that resolved; may have been sarcoid-related LN that diminished.\par \par \par VISIT 5/21/2021:\par Ms. POWELL presents for chronic right facial swelling for a few weeks\par Right facial swelling is nontender and fluctuates. Still gets dependent right nasal congestion. Treated w/ Augmentin for sinusitis in early April 2021. No dental/mouth pain, skin irritation, trouble swallowing or facial trauma. She had a left "dental boil" in March.\par In March 2021, Dr. Barnes put her back on oral steroids for sarcoid because she was getting bad pain (like labor pain) radiating from right neck into sternal area of chest sternal after getting a burpy feeling. (chest pain preceded her original diagnosis in 2019) Symptoms had improved in frequency but persist; she is getting tapered off the steroids. \par Pulmonary sarcoidosis diagnosis was confirmed after mediastinoscopy and LN bx showed noncaseating granulomas in 2019. Was on prednisone until tapered off by Aug 2020.\par For over a month, she often gets lightheaded and feels like she needs to lie down. She is going to see a Neurologist for this issue.\par \par VISIT 4/07/2021:\par Ms. Powell still has dependent right nasal congestion and chronic pressure in right maxillary area. No change in these sx or her chest pain after started prednisone 20 mg last week for sarcoidosis flare (had same type of chest pain as preceded her original diagnosis in 2019).\par CT sinus was done to evaluate the left upper gum "boil" seen by the dentist.\par Still with chronic right postnasal drip and mucus in throat, which she has had x years. \par Over past few days, feels a little dizzy when she gets of bed and has pressure right side of head.\par \par \par \par US HEAD AND NECK SOFT TISSUE 6/29/2021\par - COMPARISON: Thyroid ultrasound from 2/12/2021. Left neck US 6/13/2019. CT maxillofacial from 3/28/2021.\par - Ultrasound examination of the soft tissues of the neck bilaterally demonstrates a benign-appearing left level IV lymph node with fatty hilum and no abnormal vascularity measuring 1.4 x 0.6 x 0.8 cm. No abnormal lymph nodes in the right neck.\par - The right parotid gland measures 4.5 x 1.5 x 2.3 cm and demonstrates normal echogenicity and vascularity. Interval decrease in size of left parotid gland now measuring 3.8 x 1.4 x 2.6 cm, previously 5.7 x 1.8 x 3.5 cm on 6/13/2019. The left parotid gland demonstrates normal echogenicity and vascularity without identification of previously seen 0.3 cm complex cyst.\par - No cyst, abscess, or mass is seen in the neck.\par IMPRESSION:\par Since 6/13/2019, interval decrease in size of the left parotid gland. No parotid enlargement or cyst, abscess, or mass identified in the bilateral neck soft tissues.\par \par \par CT MAXILLOFACIAL noncontrast (03/28/2021) at [facility]:\par - Prior Studies: No prior maxillofacial imaging.\par * Prior Surgery: There appears to have been prior right nasoantral window as well as uncinectomy, limited anterior ethmoidectomy and extensive middle turbinectomy. On the left, there has been both middle and inferior turbinoplasty.\par * Sinuses: Trace mucosal thickening is evident within the right maxillary alveolar recess and within bilateral anterior ethmoidal air cells. The remaining paranasal sinuses are clear.\par * Sinocranial and Sino-orbital Junctions: The anterior skull base and orbital walls are intact. Right sphenoethmoidal cell pneumatization is noted with the dominant right sphenoid sinus noted to extend into the right pterygoid process, greater sphenoid wing and dorsum sella.\par * Ostiomeatal Units: The right infundibulotomy bed is occluded by mucosal thickening, with a small component of the more posterior maxillary antrostomy as well as the nasoantral windows noted to be patent. The components of the left ostiomeatal unit are widely patent.\par * Frontal Sinus Outflow Tracts: Patent.\par * Sphenoethmoidal Recesses: Clear.\par * Maxillary Teeth: While there is no evidence of odontogenic cyst within either the maxillary or mandibular alveolar ridges, dental artifact precludes assessment of the adjacent soft tissues.\par * Nasal Cavity/Nasopharynx: There is prominent leftward septal deviation, with partial bilateral turbinate resections, as above. No discrete nasal or nasopharyngeal mass is identified\par * Orbits: Normal.\par * Brain: No hydrocephalus or large intracranial mass lesion.\par * Mastoids: Clear.\par IMPRESSION:\par In this patient with evidence of previous bilateral sinus surgery, only trace mucosal thickening is noted in the right maxillary alveolar recess and selected bilateral anterior ethmoidal air cells. \par While no large odontogenic cyst is identified within the left maxillary alveolar ridge, assessment of the adjacent soft tissues is severely limited by dental artifact.\par \par \par US THYROID (02/12/2021) at [facility]:\par - Prior study: Ultrasound thyroid dated 3/29/2019\par - RIGHT LOBE: 5 x 0.9 x 1.8 cm, homogenous, normal vascularity and contains 3 spongiform nodules.\par  --- 0.4 x 0.4 x 0.3 cm spongiform nodule in medial midpole, grossly stable\par  --- 0.5 x 0.3 x 0.4 cm spongiform nodule in lateral midpole, grossly stable\par  --- 0.5 x 0.3 x 0.5 cm spongiform nodule in lateral midpole, previously 0.2 x 0.2 x 0.2 cm\par - LEFT LOBE: 4.4 x 0.7 x 1.6 cm, homogenous, normal vascularity and contains no visible nodules.\par - ISTHMUS: 0.2 cm and contains no visible nodules.\par - CERVICAL LYMPH NODES: No abnormal lymph nodes are identified in the neck.\par - PARATHYROIDS: Not visualized.\par IMPRESSION:\par 1.) Since 3/29/2019, there has been resolution of left thyroid lobe nodule.\par 2.) 3 spongiform nodules in the right thyroid lobe do not meet the criteria for FNA biopsy.\par \par \par US NECK (9/17/2019) at [facility]:\par - Limited images of left thyroid lobe demonstrate no evidence of a nodule. The lobe is homogeneous.\par - Biopsy cancelled\par \par USG FNA Left thyroid nodule, upper pole, 1.6 cm (7/03/2019) at [facility]:\par - Nondiagnostic\par \par USG FNA Left thyroid nodule, upper pole, 1.6 cm (6/13/2019) at [facility]:\par - Nondiagnostic (ThinPrep slid and cell block are acellular)\par \par US NECK (06/13/2019) at [facility]:\par - Comparison: MR brain 4/19/2019 \par - Targeted ultrasound images of the left parotid gland were performed. \par The parotid gland appears homogeneous and with normal vascularity. \par A 0.3 x 0.2 x 0.3 cm complex cystic lesion with an internal septation in the mid left parotid gland. \par IMPRESSION: \par There is a 0.3 cm complex cystic lesion with a septation in the left parotid gland. It has a nonspecific appearance.\par \par US THYROID (3/29/2019) at [facility]:\par - RIGHT thyroid lobe 4.5 x 1.0 x 1.9 cm, homogeneous, with 3 cysts\par  -- 0.2 x 0.2 x 0.2 cm cyst in midpole\par  -- 0.4 x 0.3 x 0.4 cm complex cyst with internal septations and debris in mid pole\par  -- 0.4 x 0.2 x 0.3 cm cyst with internal septation in the lower pole adjacent to isthmus\par - LEFT thyroid lobe 4.9 x 1.1 x 1.7 cm, homogeneous\par  -- 0.5 x 0.3 x 0.4 cm cyst in upper pole\par  -- 1.9 x 0.7 x 0.8 cm complex cystic and solid, including eccentric anterior solid component measuring 1.7 cm, hypoechoic; no microcalcifications\par - ISTHMUS 0.2 cm with no nodules\par - CERVICAL LYMPH NODES: No abnormal lymph nodes identified in neck\par - PARATHYROID GLANDS: Not visualized\par \par CT CHEST with iv contrast (3/27/2019) at [facility]:\par -- Comparison to CT 11/28/2017\par -- Interval development of significant mediastinal and bilateral hilar lymphadenopathy. \par  -- Right paratracheal lymph node, 1.9 cm. \par  -- Enlarged subcarinal lymph node, 1.8 cm\par  -- Enlarged bilateral hilar lymph nodes, up to 1.8 cm on right\par  -- No significant axillary adenopathy.\par - Biapical scarring, 5 mm nodule in RLL (previously 4 mm)\par - Enlarged paraesophageal lymph node , 1.1 cm\par - Few bilateral subcm hypodense thyroid nodules, up to 0.9 cm\par - Otherwise unremarkable

## 2021-07-30 NOTE — PHYSICAL EXAM
[Normal] : mucosa is normal [Midline] : trachea located in midline position [Removed] : palatine tonsils previously removed [FreeTextEntry1] : No hoarseness.  [de-identified] : Mild tenderness of right submandibular gland, not enlarged.   [de-identified] : Minimal swelling, nontender, of right > left parotid gland.  No palpable thyroid nodule. [de-identified] : Right TMJ subluxation , nontender  [de-identified] : hypertrophy inferior turbinates bilateral  [de-identified] : No stone palpable in floor of mouth. [de-identified] : Small nontender LNs bilateral level 2.

## 2021-08-03 ENCOUNTER — RX RENEWAL (OUTPATIENT)
Age: 61
End: 2021-08-03

## 2021-08-03 ENCOUNTER — APPOINTMENT (OUTPATIENT)
Dept: PULMONOLOGY | Facility: CLINIC | Age: 61
End: 2021-08-03
Payer: COMMERCIAL

## 2021-08-03 VITALS
SYSTOLIC BLOOD PRESSURE: 124 MMHG | TEMPERATURE: 97.3 F | WEIGHT: 165 LBS | HEIGHT: 63 IN | OXYGEN SATURATION: 98 % | HEART RATE: 87 BPM | BODY MASS INDEX: 29.23 KG/M2 | DIASTOLIC BLOOD PRESSURE: 80 MMHG

## 2021-08-03 PROCEDURE — 99213 OFFICE O/P EST LOW 20 MIN: CPT

## 2021-08-04 NOTE — HISTORY OF PRESENT ILLNESS
[TextBox_4] : 05/06/2019: Asked to evaluate patient by Dr Myers for sarcoidosis. Here w daughter. Started losing weight about 3 mos ago, 30 lbs. CXR showed adenopathy, and mediastinoscopy showed noncaseating granulomas. Additional symptoms include muscle cramps, dry mouth, change in taste, fatigue. + chest discomfort in chest, no palps. Coughing since biopsy, dry. No vision changes. No arthritis/arthralgia. No EN. No fever but did have chills at the beginning. She is diabetic and Invokana was just added; remains on metformin.  today. 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 Started pred 30mg and Dr Gayle started HBV ppx.\par \par On prednisone energy and cough improved. Required Augmentin for sinusitis in the summer 2019. Inc wheezing on prendisone 15mg. Sept 2019 seen in ED for L chest and abd pain and given pantoprazole. Last seen in Feb 2020 on 10mg prednisone doing great, using Symbicort prn, resolved abdominal pain on pantoprazole. Plan was to taper to off.\par \par 8/7/20: She did taper off prednisone as we had planned and had no recurrence of symptoms at all. No cough, no fatigue, no complaints, feels great without any symptoms. Has still rarely used Symbicort for cough but not using it now. Did not have Covid, has been well. Daughter is being evaluated for an autoimmune disease.\par \par 3/29/21: For about 3 mos she's had chest pain that she describes as being like "labor pain," awakening her from sleep but also happening during the day, not necessarily every day but as much as twice a day, lasting a minute or so. This is the same kind of pain she had leading to diagnosis of sarcoid. Also having nocturnal "pulling" leg pain waking her from sleep, hands and fingers are also cramping. Also low back pain. No weight loss, no fever. Had Covid vaccine x 2, Pfizer. Has been off prednisone for about a year. No dyspnea. Diabetes up and down. Saw Dr Contreras, treadmill echo was fine. Restarted a low dose of prednisone.\par \par 4/9/21: Telehealth visit (both in [place]) to follow up on the response to prednisone. She feels somewhat better. She is still experiencing the same chest pain, but it is less frequent - went a week between episodes. Having less cramping. Overall symptoms still present but better. Had virtual visit w Dr Parham who is going to add Augmentin for sinus disease. Has not been noting increased glucose readings on the prednisone.\par \par 4/23/21: Telehealth visit (both in [place]) to follow up on the response to prednisone; she consents to telehealth services and we are the only people on the call. Cont on 20mg prednisone x 4 weeks. Had an episode of elevated BP and glucose and vomiting last weekend but resolved. Maybe 3 episodes of pain per week. Still w muscle cramps. Lightheadedness.\par \par 6/30/21: Has seen neurology who felt that her lightheadedness may be due to hyperventilation. The chest pain is not really better or worse. For various reasons she has not been able to have her CT scan (cardiac and lung). Has been rescheduled 7/24. Legs feel week. Feet are cramped, bad enough to keep her from sleeping. Fatigued again - more sleepy actually. No weight loss, is actually gaining. No clinical change on reducing prednisone from 20mg to 15mg. Blood sugars have been elevated. Bumped prednisone to 30mg.\par \par 8/3/21: In the interim was admitted for lactic acidosis from metformin. She now wonders if she was feeling so bad all along from lactic acidosis. After discharge she felt so much better she never started the 30mg prednisone and remains on 15mg. Saw Dr Fajardo in the interim 7/29 who started MTX at 7.5 weekly w plan to taper steroids. She is 99.9% better now. She's off metformin and on insulin.

## 2021-08-04 NOTE — PHYSICAL EXAM
[No Acute Distress] : no acute distress [Normal Rate/Rhythm] : normal rate/rhythm [Normal S1, S2] : normal s1, s2 [No Murmurs] : no murmurs [No Resp Distress] : no resp distress [Clear to Auscultation Bilaterally] : clear to auscultation bilaterally [Normal Affect] : normal affect

## 2021-08-04 NOTE — ASSESSMENT
[FreeTextEntry1] : Data reviewed:\par \par Labs 3/30/21: CBC normal, gluc 181, ESR 18, CRP 7 (elev), ACE 83 (uln 82)\par \par ECG and echo Dr Contreras 4/2019 - ok\par \par CT chest [facility] 7/2021 personally reviewed and shows substantial resolution of adenopathy and no sig parenchymal disease, stable tiny nodule\par \par PA/lat CXR in office 3/29/21: clear lungs, no change from prior\par \par Surg path 4/17/19: nonnecrotizing granulomas / fungal & AFB stains neg / flow neg\par \par PFT 5/7/19: nonspecific ventilatory abnormality, no BD response, TLC 89%, DLCO 70%\par PFT 8/28/19: no obstruction, no BD response, mild restriction, TLC 76%, DLCO 71%\par [place] 2/4/20: normal\par \par Impression:\par Sarcoidosis, was symptomatic, tapered off prednisone in March 2020\par Put back on treatment with similar chest pain and cramps, without weight loss\par However, symptoms have resolved after discovery of lactic acidosis due to metformin\par \par Plan:\par I am now wondering if her symptoms were due to recurrence of sarcoid at all.\par I will d/w Dr Fajardo.\par I query whether we should taper prednisone and observe her off meds.

## 2021-08-05 ENCOUNTER — APPOINTMENT (OUTPATIENT)
Dept: OTOLARYNGOLOGY | Facility: CLINIC | Age: 61
End: 2021-08-05

## 2021-08-11 ENCOUNTER — RX RENEWAL (OUTPATIENT)
Age: 61
End: 2021-08-11

## 2021-08-17 ENCOUNTER — APPOINTMENT (OUTPATIENT)
Dept: OPHTHALMOLOGY | Facility: CLINIC | Age: 61
End: 2021-08-17
Payer: COMMERCIAL

## 2021-08-17 ENCOUNTER — NON-APPOINTMENT (OUTPATIENT)
Age: 61
End: 2021-08-17

## 2021-08-17 PROCEDURE — 92285 EXTERNAL OCULAR PHOTOGRAPHY: CPT

## 2021-08-17 PROCEDURE — 92014 COMPRE OPH EXAM EST PT 1/>: CPT

## 2021-08-18 ENCOUNTER — APPOINTMENT (OUTPATIENT)
Dept: INTERNAL MEDICINE | Facility: CLINIC | Age: 61
End: 2021-08-18
Payer: COMMERCIAL

## 2021-08-18 VITALS
WEIGHT: 173 LBS | HEIGHT: 63 IN | DIASTOLIC BLOOD PRESSURE: 82 MMHG | TEMPERATURE: 97.9 F | HEART RATE: 97 BPM | SYSTOLIC BLOOD PRESSURE: 130 MMHG | OXYGEN SATURATION: 97 % | BODY MASS INDEX: 30.65 KG/M2

## 2021-08-18 DIAGNOSIS — K21.00 GASTRO-ESOPHAGEAL REFLUX DISEASE WITH ESOPHAGITIS, WITHOUT BLEEDING: ICD-10-CM

## 2021-08-18 PROCEDURE — 99214 OFFICE O/P EST MOD 30 MIN: CPT

## 2021-08-18 PROCEDURE — 36415 COLL VENOUS BLD VENIPUNCTURE: CPT

## 2021-08-18 RX ORDER — BLOOD-GLUCOSE METER
KIT MISCELLANEOUS
Qty: 3 | Refills: 3 | Status: ACTIVE | COMMUNITY
Start: 2021-08-10 | End: 1900-01-01

## 2021-08-18 NOTE — REVIEW OF SYSTEMS
[Chest Pain] : chest pain [Joint Pain] : joint pain [Dizziness] : dizziness [Negative] : Psychiatric [Fever] : no fever [Chills] : no chills [Fatigue] : no fatigue [Recent Change In Weight] : ~T no recent weight change [Pain] : no pain [Redness] : no redness [Vision Problems] : no vision problems [Earache] : no earache [Hearing Loss] : no hearing loss [Hoarseness] : no hoarseness [Nasal Discharge] : no nasal discharge [Sore Throat] : no sore throat [Shortness Of Breath] : no shortness of breath [Wheezing] : no wheezing [Cough] : no cough [Dyspnea on Exertion] : no dyspnea on exertion [Abdominal Pain] : no abdominal pain [Nausea] : no nausea [Constipation] : no constipation [Melena] : no melena [Dysuria] : no dysuria [Joint Stiffness] : no joint stiffness [Joint Swelling] : no joint swelling [Itching] : no itching [Skin Rash] : no skin rash [FreeTextEntry3] : As above [FreeTextEntry4] : As above. [FreeTextEntry9] : bl knee pain

## 2021-08-18 NOTE — PLAN
[FreeTextEntry1] : Facial swelling:\par -stable but still present - no findings on exams....for now continue to monitor\par \par DM2 - poorly controlled:\par - continue current insulin regimen\par - f/up Dr Sauceda in 2-3 weeks\par - reviewed need for a stricter diet\par \par Sarcoidosis:\par - Cont Pred 15mg\par - MTX per Dr Galvan\par \par Mammo referral\par GYn referral\par podiatry referral\par check covid ab test

## 2021-08-18 NOTE — HISTORY OF PRESENT ILLNESS
[FreeTextEntry1] : general follow up [de-identified] : 59 yo f with T2DM, Sarcoidosis, HTN, HLD, thyroid nodules\par \par - Sarcoidosis: Currently on 15mg of Prednisone, and recently started on MTX by Rheum\par - DM- feeling much better off metformin\par    Only on Insulin Lantus: 32Units QHS, Humalog 24 Units with each meal. \par  - Has not been able to check her FSs recently.  Last check one week ago was 169 fasting.\par  - Last A1C of 10.3% July 2019.\par   - Next appt in September.  \par \par Had an accident at home-  coffee mug fell on right foot one month ago\par \par colonoscopy 3/19\par Mammo 3/18\par Gyn years ago\par Ophtho yesterday - no evidence of DM Retinopathy

## 2021-08-19 ENCOUNTER — NON-APPOINTMENT (OUTPATIENT)
Age: 61
End: 2021-08-19

## 2021-08-19 LAB
COVID-19 SPIKE DOMAIN ANTIBODY INTERPRETATION: POSITIVE
SARS-COV-2 AB SERPL IA-ACNC: 178 U/ML

## 2021-08-21 ENCOUNTER — RESULT REVIEW (OUTPATIENT)
Age: 61
End: 2021-08-21

## 2021-08-21 ENCOUNTER — OUTPATIENT (OUTPATIENT)
Dept: OUTPATIENT SERVICES | Facility: HOSPITAL | Age: 61
LOS: 1 days | End: 2021-08-21
Payer: COMMERCIAL

## 2021-08-21 ENCOUNTER — APPOINTMENT (OUTPATIENT)
Dept: MAMMOGRAPHY | Facility: IMAGING CENTER | Age: 61
End: 2021-08-21
Payer: COMMERCIAL

## 2021-08-21 DIAGNOSIS — Z41.9 ENCOUNTER FOR PROCEDURE FOR PURPOSES OTHER THAN REMEDYING HEALTH STATE, UNSPECIFIED: Chronic | ICD-10-CM

## 2021-08-21 DIAGNOSIS — Z98.890 OTHER SPECIFIED POSTPROCEDURAL STATES: Chronic | ICD-10-CM

## 2021-08-21 DIAGNOSIS — Z00.8 ENCOUNTER FOR OTHER GENERAL EXAMINATION: ICD-10-CM

## 2021-08-21 PROCEDURE — 77067 SCR MAMMO BI INCL CAD: CPT

## 2021-08-21 PROCEDURE — 77067 SCR MAMMO BI INCL CAD: CPT | Mod: 26

## 2021-08-21 PROCEDURE — 77063 BREAST TOMOSYNTHESIS BI: CPT | Mod: 26

## 2021-08-21 PROCEDURE — 77063 BREAST TOMOSYNTHESIS BI: CPT

## 2021-08-30 ENCOUNTER — APPOINTMENT (OUTPATIENT)
Dept: ENDOCRINOLOGY | Facility: CLINIC | Age: 61
End: 2021-08-30
Payer: COMMERCIAL

## 2021-08-30 ENCOUNTER — NON-APPOINTMENT (OUTPATIENT)
Age: 61
End: 2021-08-30

## 2021-08-30 ENCOUNTER — APPOINTMENT (OUTPATIENT)
Dept: RHEUMATOLOGY | Facility: CLINIC | Age: 61
End: 2021-08-30
Payer: COMMERCIAL

## 2021-08-30 VITALS
OXYGEN SATURATION: 99 % | WEIGHT: 173 LBS | RESPIRATION RATE: 16 BRPM | HEART RATE: 92 BPM | DIASTOLIC BLOOD PRESSURE: 84 MMHG | HEIGHT: 63 IN | TEMPERATURE: 97.7 F | BODY MASS INDEX: 30.65 KG/M2 | SYSTOLIC BLOOD PRESSURE: 126 MMHG

## 2021-08-30 DIAGNOSIS — E11.9 TYPE 2 DIABETES MELLITUS W/OUT COMPLICATIONS: ICD-10-CM

## 2021-08-30 DIAGNOSIS — Z71.89 OTHER SPECIFIED COUNSELING: ICD-10-CM

## 2021-08-30 PROCEDURE — 36415 COLL VENOUS BLD VENIPUNCTURE: CPT

## 2021-08-30 PROCEDURE — 99442: CPT

## 2021-08-30 PROCEDURE — 99214 OFFICE O/P EST MOD 30 MIN: CPT | Mod: 25

## 2021-08-30 NOTE — PHYSICAL EXAM
[General Appearance - Alert] : alert [General Appearance - In No Acute Distress] : in no acute distress [General Appearance - Well Nourished] : well nourished [General Appearance - Well Developed] : well developed [General Appearance - Well-Appearing] : healthy appearing [Neck Appearance] : the appearance of the neck was normal [Respiration, Rhythm And Depth] : normal respiratory rhythm and effort [Exaggerated Use Of Accessory Muscles For Inspiration] : no accessory muscle use [Auscultation Breath Sounds / Voice Sounds] : lungs were clear to auscultation bilaterally [Heart Rate And Rhythm] : heart rate was normal and rhythm regular [Heart Sounds] : normal S1 and S2 [Murmurs] : no murmurs [Heart Sounds Pericardial Friction Rub] : no pericardial rub [Edema] : there was no peripheral edema [Abnormal Walk] : normal gait [Nail Clubbing] : no clubbing  or cyanosis of the fingernails [Musculoskeletal - Swelling] : no joint swelling seen [Motor Tone] : muscle strength and tone were normal [Skin Color & Pigmentation] : normal skin color and pigmentation [Skin Turgor] : normal skin turgor [] : no rash [Skin Lesions] : no skin lesions [Oriented To Time, Place, And Person] : oriented to person, place, and time [FreeTextEntry1] : R knee tender, no synovitis or limited ROM

## 2021-08-30 NOTE — HISTORY OF PRESENT ILLNESS
[Fatigue] : fatigue [Dry Mouth] : dry mouth [Arthralgias] : arthralgias [Difficulty Walking] : difficulty walking [Muscle Weakness] : muscle weakness [Muscle Spasms] : muscle spasms [Muscle Cramping] : muscle cramping [Dry Eyes] : dry eyes [___ Month(s) Ago] : [unfilled] month(s) ago [FreeTextEntry1] : Follow up: 8/30/21 \par \par 61 yo woman with uncontrolled T2DM (A1c 10.9%) with diabetic neuropathy and thyroid nodules, Hep B core Ab positive \par Sarcoidosis, diagnosed in 2019, when she presented with 30Ibs weight loss for 3 months,  CXR showed adenopathy, and mediastinoscopy showed noncaseating granulomas. Prednisone tapered off,  but placed back with recurrence of similar symptoms, however resolved after discovery of lactic acidosis, on prednisone 15mg since March 2021 and on prior visit we discussed steroid sparing agent and pt agreed with MTX trial. \par On MTX for 4 weeks now and denies any side effects. \par \par \par Medications; \par prednisone 15\par MTX 7.5mg q weekly \par Folic acid 1mg daily \par \par \par \par \par Initial visit: 7/29/21 \par \par Referred by Dr. Parham for Rheumatology consultation \par \par \par 61 yo woman with uncontrolled T2DM (A1c 10.9%) with diabetic neuropathy and thyroid nodules\par Sarcoidosis, diagnosed in 2019, when she presented with 30Ibs weight loss for 3 months,  CXR showed adenopathy, and mediastinoscopy showed noncaseating granulomas. Additional symptoms include muscle cramps, dry mouth, change in taste, fatigue,  chest discomfort. No vision changes. No arthritis/arthralgia. No EN. No fever but did have chills at the beginning. She was started pred 30mg and Dr Gayle started HBV ppx, as per note but pt states she does not remember taking any prophylaxis\par Was on prednisone until tapered off by August 2020 and again restarted and currently on prednisone 15mg since March as ptr was experiencing some chest tightness and though due to sarcoidosis flare. \par Pt follows with ENT Dr. Shari Parham for  chronic right facial swelling.  Treated w/ Augmentin for sinusitis in early April 2021. Found parotid swelling and LAD- likely related to Sarcoidosis. \par Pt has never tried any steroid sparing agents in the past and never seen Rheumatologist. \par Pt reports arthralgia of elbows and knees, but denies joint swelling, AM stiffness \par Denies skin rash. \par She had recent work up such as CT angio coronaries- where noted mildly prominent b/l mediastinal lymph nodes, significantly decreased in size compare to prior study. \par MIld to moderate stenosis of in proximal :AD, proximal LCX, OM1 and mid RCA \par She had recent hospitalization for metformin induced lactic acidosis. \par  [Anorexia] : no anorexia [Weight Loss] : no weight loss [Malaise] : no malaise [Fever] : no fever [Chills] : no chills [Depression] : no depression [Malar Facial Rash] : no malar facial rash [Skin Lesions] : no lesions [Skin Nodules] : no skin nodules [Oral Ulcers] : no oral ulcers [Cough] : no cough [Dysphonia] : no dysphonia [Dysphagia] : no dysphagia [Shortness of Breath] : no shortness of breath [Chest Pain] : no chest pain [Joint Swelling] : no joint swelling [Joint Warmth] : no joint warmth [Joint Deformity] : no joint deformity [Decreased ROM] : no decreased range of motion [Morning Stiffness] : no morning stiffness [Falls] : no falls [Dyspnea] : no dyspnea [Myalgias] : no myalgias [Visual Changes] : no visual changes [Eye Pain] : no eye pain [Eye Redness] : no eye redness

## 2021-08-30 NOTE — ASSESSMENT
[FreeTextEntry1] : 59 yo woman with uncontrolled T2DM (A1c 10.9%) with diabetic neuropathy and thyroid nodules, Hep B core Ab positive \par Sarcoidosis, diagnosed in 2019, when she presented with 30Ibs weight loss for 3 months,  CXR showed adenopathy, and mediastinoscopy showed noncaseating granulomas. Additional symptoms include muscle cramps, dry mouth, change in taste, fatigue,  chest discomfort. No vision changes. No arthritis/arthralgia. No EN. No fever but did have chills at the beginning. She was started pred 30mg and Dr Gayle started HBV ppx, as per note but pt states she does not remember taking any prophylaxis\par Was on prednisone until tapered off by Aug 2020 and again restarted and currently on prednisone 15mg since March, 2021 as ptr was experiencing some chest tightness and though due to sarcoidosis flare, however resolved after discovery of lactic acidosis, \par She had recent work up such as CT angio coronaries- where noted mildly prominent b/l mediastinal lymph nodes, significantly decreased in size compare to prior study. \par CAD: MIld to moderate stenosis of in proximal :AD, proximal LCX, OM1 and mid RCA \par \par 1. Sarcoidosis: biopsy proven- on chronic steroids \par Given hx of poorly controlled DM and risk for Osteoporosis from chronic steroid use added treatment with steroid sparing agents- MTX and currently she is taking 7.5mg weekly \par instructed to taper down prednisone 15mg to 10mg for next 3 weeks and if stable symptoms she can further reduce the dose to 5mg \par check labs today CBC, Chem and  Hep B PCR as well \par \par 2. Hep B core Ab; saw Dr. Gayle and she supposed to be on tenofovir for prophylaxis, but pt states never took it. \par Recommended to contact GI for the medication discussion. \par \par 3. Bone health \par I reviewed the DXA done on  July 21, 2021- normal bone density. \par \par \par 4. counselled for booster covid 19 vaccine \par \par f/u in 6 weeks. \par

## 2021-08-31 LAB
ALBUMIN SERPL ELPH-MCNC: 4.2 G/DL
ALP BLD-CCNC: 98 U/L
ALT SERPL-CCNC: 29 U/L
ANION GAP SERPL CALC-SCNC: 12 MMOL/L
AST SERPL-CCNC: 17 U/L
BASOPHILS # BLD AUTO: 0.03 K/UL
BASOPHILS NFR BLD AUTO: 0.3 %
BILIRUB SERPL-MCNC: 0.4 MG/DL
BUN SERPL-MCNC: 21 MG/DL
CALCIUM SERPL-MCNC: 9.3 MG/DL
CHLORIDE SERPL-SCNC: 107 MMOL/L
CO2 SERPL-SCNC: 22 MMOL/L
CREAT SERPL-MCNC: 0.59 MG/DL
EOSINOPHIL # BLD AUTO: 0.08 K/UL
EOSINOPHIL NFR BLD AUTO: 0.8 %
GLUCOSE SERPL-MCNC: 60 MG/DL
HCT VFR BLD CALC: 43.8 %
HGB BLD-MCNC: 13.2 G/DL
IMM GRANULOCYTES NFR BLD AUTO: 0.6 %
LYMPHOCYTES # BLD AUTO: 1.05 K/UL
LYMPHOCYTES NFR BLD AUTO: 9.8 %
MAN DIFF?: NORMAL
MCHC RBC-ENTMCNC: 30.1 GM/DL
MCHC RBC-ENTMCNC: 31.4 PG
MCV RBC AUTO: 104 FL
MONOCYTES # BLD AUTO: 0.76 K/UL
MONOCYTES NFR BLD AUTO: 7.1 %
NEUTROPHILS # BLD AUTO: 8.68 K/UL
NEUTROPHILS NFR BLD AUTO: 81.4 %
PLATELET # BLD AUTO: 265 K/UL
POTASSIUM SERPL-SCNC: 4.3 MMOL/L
PROT SERPL-MCNC: 6.5 G/DL
RBC # BLD: 4.21 M/UL
RBC # FLD: 13.8 %
SODIUM SERPL-SCNC: 142 MMOL/L
WBC # FLD AUTO: 10.66 K/UL

## 2021-09-02 LAB
HBV DNA # SERPL NAA+PROBE: NOT DETECTED
HEPB DNA PCR LOG: NOT DETECTED LOG10IU/ML

## 2021-09-09 ENCOUNTER — APPOINTMENT (OUTPATIENT)
Dept: NEUROLOGY | Facility: CLINIC | Age: 61
End: 2021-09-09
Payer: COMMERCIAL

## 2021-09-09 VITALS
DIASTOLIC BLOOD PRESSURE: 92 MMHG | HEIGHT: 63 IN | WEIGHT: 176 LBS | BODY MASS INDEX: 31.18 KG/M2 | HEART RATE: 81 BPM | SYSTOLIC BLOOD PRESSURE: 136 MMHG

## 2021-09-09 DIAGNOSIS — F45.8 OTHER SOMATOFORM DISORDERS: ICD-10-CM

## 2021-09-09 PROCEDURE — 99212 OFFICE O/P EST SF 10 MIN: CPT

## 2021-09-09 NOTE — HISTORY OF PRESENT ILLNESS
[FreeTextEntry1] : 60-year-old woman seen 3 months ago with lightheadedness secondary to hyperventilation.  Her lightheadedness has resolved.  She has no neurologic complaints.

## 2021-09-13 ENCOUNTER — RX RENEWAL (OUTPATIENT)
Age: 61
End: 2021-09-13

## 2021-09-20 ENCOUNTER — APPOINTMENT (OUTPATIENT)
Dept: ENDOCRINOLOGY | Facility: CLINIC | Age: 61
End: 2021-09-20
Payer: COMMERCIAL

## 2021-09-20 VITALS
HEART RATE: 87 BPM | HEIGHT: 63 IN | OXYGEN SATURATION: 98 % | DIASTOLIC BLOOD PRESSURE: 85 MMHG | TEMPERATURE: 97.3 F | SYSTOLIC BLOOD PRESSURE: 127 MMHG | WEIGHT: 175 LBS | BODY MASS INDEX: 31.01 KG/M2

## 2021-09-20 DIAGNOSIS — E04.2 NONTOXIC MULTINODULAR GOITER: ICD-10-CM

## 2021-09-20 LAB — HBA1C MFR BLD HPLC: 8.1

## 2021-09-20 PROCEDURE — 99214 OFFICE O/P EST MOD 30 MIN: CPT | Mod: 25

## 2021-09-20 PROCEDURE — 83036 HEMOGLOBIN GLYCOSYLATED A1C: CPT | Mod: QW

## 2021-09-20 NOTE — REVIEW OF SYSTEMS
[Fatigue] : no fatigue [Decreased Appetite] : appetite not decreased [Visual Field Defect] : no visual field defect [Dysphagia] : no dysphagia [Dysphonia] : no dysphonia [Chest Pain] : no chest pain [Palpitations] : no palpitations [Shortness Of Breath] : no shortness of breath [Nausea] : no nausea [Vomiting] : no vomiting [Joint Pain] : no joint pain [Headaches] : no headaches [Tremors] : no tremors [Depression] : no depression

## 2021-09-20 NOTE — HISTORY OF PRESENT ILLNESS
[FreeTextEntry1] : Patient is a 59 yo woman with uncontrolled T2DM (A1c 10.9%) and thyroid nodules here for follow up.\par \par Diagnosed with diabetes around , managed by PCP. Complications include neuropathy. Patient was diagnosed with sarcoidosis and started on prednisone 1 year ago.\par  Currently takes prednisone 10 mg daily decreased from 5 mg because she was stared on methotrexate. No plans to taper at this time but will follow up with pulm shortly\par She was admitted to [facility] in July for hyperglycemia and lactic acidosis. Presenting symptoms were chest pain and light headed-ness. During this admission, she was seen by endocrinology and metformin was stopped. Patient was discharged on basal/bolus insulin.\par In August, patient had frequent hypoglycemia. Dr. Roblero followed up and her dose of basal/bolus was adjusted.\par Currently taking Lantus 24 units and humalog 18 with meals\par Checks sugars 4 times a day\par AM: 140-150s but when "eating bad will go to over 250."\par Does not check lunch numbers\par Before dinner: 150-160\par Has symptoms with glucose levels in the 80. NO reported glucose levels < 70 since adjustment in basal/bolus doses by Dr. Roblero\par Breakfast: oatmeal, eggs, avocado, cup of coffee with cream\par Lunch: salad, used to eat a lot of rice and flour based products. Now eating more vegetables. Salad is purchased outside\par Dinner: salad, rice and stew\par Occasional "tip of a cake."\par Dilated eye exam: summer 2021, no reported retinopathy\par Patient is from [facility] and both parents  from diabetes\par \par Thyroid nodules: see data results below. Subcentimeter thyroid nodules that do not meet criteria for

## 2021-09-20 NOTE — ASSESSMENT
[FreeTextEntry1] : Patient is a 59 yo woman with uncontrolled type 2 diabetes, on prednisone for sarcoidosis, thyroid nodules establishing endocrine care today. \par \par 1. Uncontrolled Type 2 DM, POCT A1c is 8% improved not quite at goal\par -discussed the risks of micro/macrovascular events including, but not limited to, heart attack/stroke/eye complications/kidney disease at length\par -importance of glycemic control discussed; goal A1c of 7-7.5%\par -nutritional counseling established\par -importance of self-monitoring of blood glucose also discussed. Goal fasting glucose 100-130 with 2 hour post-prandial goals < 180\par -dilated eye exam up to date\par -monofilament testing done\par -continue with lantus 24 units, increase humalog to 18 units TID with meals. Discussed that if steroids are tapered, insulin requirements will likely decrease so she is to let me know what/when the steroid taper is going to be\par -start trulicity for weight and diabetes optimization.  GI side effects including nausea/vomiting/diarrhea discussed. No personal/family history of medullary thyroid cancer/MEN\par \par 2.Thyroid nodules\par -repeat thyroid US February 2022\par -clinically and chemically euthyroid\par \par Follow up in 4 months.  Call with hypo/hyperglycemic excursions\par \par  [Diabetes Foot Care] : diabetes foot care [Long Term Vascular Complications] : long term vascular complications of diabetes [Importance of Diet and Exercise] : importance of diet and exercise to improve glycemic control, achieve weight loss and improve cardiovascular health [Exercise/Effect on Glucose] : exercise/effect on glucose [Hypoglycemia Management] : hypoglycemia management [Self Monitoring of Blood Glucose] : self monitoring of blood glucose [Weight Loss] : weight loss [Retinopathy Screening] : Patient was referred to ophthalmology for retinopathy screening

## 2021-09-20 NOTE — PHYSICAL EXAM
[Alert] : alert [Well Nourished] : well nourished [Healthy Appearance] : healthy appearance [No Acute Distress] : no acute distress [EOMI] : extra ocular movement intact [Normal Hearing] : hearing was normal [No Respiratory Distress] : no respiratory distress [No Accessory Muscle Use] : no accessory muscle use [Clear to Auscultation] : lungs were clear to auscultation bilaterally [Normal S1, S2] : normal S1 and S2 [Normal Rate] : heart rate was normal [Normal Bowel Sounds] : normal bowel sounds [Not Tender] : non-tender [Soft] : abdomen soft [Normal Gait] : normal gait [Normal Strength/Tone] : muscle strength and tone were normal [No Motor Deficits] : the motor exam was normal [Normal Affect] : the affect was normal [Normal Insight/Judgement] : insight and judgment were intact [Normal Mood] : the mood was normal [Foot Ulcers] : no foot ulcers

## 2021-09-21 ENCOUNTER — NON-APPOINTMENT (OUTPATIENT)
Age: 61
End: 2021-09-21

## 2021-09-21 LAB
CREAT SPEC-SCNC: 69 MG/DL
MICROALBUMIN 24H UR DL<=1MG/L-MCNC: <1.2 MG/DL
MICROALBUMIN/CREAT 24H UR-RTO: NORMAL MG/G

## 2021-10-11 ENCOUNTER — APPOINTMENT (OUTPATIENT)
Dept: GASTROENTEROLOGY | Facility: CLINIC | Age: 61
End: 2021-10-11

## 2021-10-13 ENCOUNTER — EMERGENCY (EMERGENCY)
Facility: HOSPITAL | Age: 61
LOS: 1 days | Discharge: HOME | End: 2021-10-13
Attending: STUDENT IN AN ORGANIZED HEALTH CARE EDUCATION/TRAINING PROGRAM | Admitting: STUDENT IN AN ORGANIZED HEALTH CARE EDUCATION/TRAINING PROGRAM
Payer: COMMERCIAL

## 2021-10-13 VITALS
HEART RATE: 82 BPM | SYSTOLIC BLOOD PRESSURE: 137 MMHG | OXYGEN SATURATION: 100 % | TEMPERATURE: 98 F | DIASTOLIC BLOOD PRESSURE: 96 MMHG | RESPIRATION RATE: 16 BRPM | HEIGHT: 63 IN

## 2021-10-13 DIAGNOSIS — Z98.890 OTHER SPECIFIED POSTPROCEDURAL STATES: Chronic | ICD-10-CM

## 2021-10-13 DIAGNOSIS — Z41.9 ENCOUNTER FOR PROCEDURE FOR PURPOSES OTHER THAN REMEDYING HEALTH STATE, UNSPECIFIED: Chronic | ICD-10-CM

## 2021-10-13 PROCEDURE — 73564 X-RAY EXAM KNEE 4 OR MORE: CPT | Mod: 26,LT

## 2021-10-13 PROCEDURE — 99283 EMERGENCY DEPT VISIT LOW MDM: CPT

## 2021-10-13 RX ORDER — ACETAMINOPHEN 500 MG
650 TABLET ORAL ONCE
Refills: 0 | Status: COMPLETED | OUTPATIENT
Start: 2021-10-13 | End: 2021-10-13

## 2021-10-13 RX ADMIN — Medication 650 MILLIGRAM(S): at 16:50

## 2021-10-13 RX ADMIN — Medication 650 MILLIGRAM(S): at 17:35

## 2021-10-13 NOTE — ED ADULT TRIAGE NOTE - NS ED NURSE BANDS TYPE
Progress Note (short form)





- Note


Progress Note: 


NAD on NC O2.  


No acute events overnight. 


 Intake & Output











 02/12/20 02/13/20 02/14/20 02/15/20





 23:59 23:59 23:59 23:59


 


Intake Total [phone] 504


 


Output Total  4 4 


 


Balance 950 630 541 504


 


Weight  123 lb 9.6 oz 127 lb 








 Last Vital Signs











Temp Pulse Resp BP Pulse Ox


 


 98.6 F   102 H  18   102/70   100 


 


 02/15/20 06:31  02/15/20 08:37  02/15/20 08:37  02/15/20 08:37  02/14/20 20:00








Active Medications





Acetaminophen (Tylenol Oral Solution -)  650 mg PO Q4H PRN


   PRN Reason: FEVER


Acetaminophen (Ofirmev Injection -)  650 mg IVPB Q4H PRN


   PRN Reason: FEVER


   Last Admin: 02/13/20 17:23 Dose:  650 mg


Atorvastatin Calcium (Lipitor -)  10 mg PO HS [facility]


   Last Admin: 02/09/20 21:14 Dose:  10 mg


Ferrous Sulfate (Feosol -)  325 mg PO DAILY [facility]


   Last Admin: 02/14/20 10:14 Dose:  Not Given


Furosemide (Lasix -)  40 mg PO BIDLASIX [facility]


   Last Admin: 02/15/20 05:24 Dose:  40 mg


Potassium Chloride 10 meq/ (Dextrose)  1,005 mls @ 42 mls/hr IV Q24H [facility]


   Last Admin: 02/14/20 15:22 Dose:  Not Given


Lorazepam (Ativan Injection -)  1 mg IM BID PRN


   PRN Reason: ANXIETY


   Last Admin: 02/12/20 21:00 Dose:  1 mg


Metoprolol Succinate (Toprol Xl -)  50 mg PO BID [facility]


   Last Admin: 02/14/20 21:34 Dose:  50 mg


Metoprolol Tartrate (Lopressor Injection -)  5 mg IVPUSH Q4H PRN


   PRN Reason: TACHYCARDIA


   Last Admin: 02/12/20 23:59 Dose:  5 mg


Pantoprazole Sodium (Protonix Iv)  40 mg IVPUSH BID [facility]


   Last Admin: 02/14/20 21:34 Dose:  40 mg


Polyethylene Glycol (Miralax (For Daily Use) -)  17 gm PO DAILY [facility]


   Last Admin: 02/14/20 10:14 Dose:  Not Given


Quetiapine Fumarate (Seroquel -)  50 mg PO TID [facility]


   Last Admin: 02/14/20 05:19 Dose:  Not Given


Warfarin Sodium (Coumadin -)  0.5 mg PO DAILY@1800 [facility]


   Last Admin: 02/14/20 17:42 Dose:  0.5 mg











Constitutional: Yes: No Distress


Eyes: Yes: EOM Intact


HENT: Yes: Normocephalic


Neck: Yes: Trachea Midline


Cardiovascular: Yes: Pulse Irregular, S1, S2


Respiratory: Yes: Diminished


Gastrointestinal: Yes: Soft


Edema: No


Labs: 


  Laboratory Results - last 24 hr











  02/14/20 02/14/20 02/14/20





  01:33 11:02 17:40


 


WBC  9.4  


 


RBC  3.58 L  


 


Hgb  8.7 L  


 


Hct  28.6 L  


 


MCV  79.9 L  


 


MCH  24.4 L  


 


MCHC  30.6 L  


 


RDW  19.5 H  


 


Plt Count  197  


 


MPV  7.5  


 


Absolute Neuts (auto)  6.9  


 


Neutrophils %  73.4  


 


Lymphocytes %  8.4  


 


Monocytes %  10.4 H  


 


Eosinophils %  7.1 H D  


 


Basophils %  0.7  


 


Nucleated RBC %  0  


 


PT with INR   


 


INR   


 


Sodium   


 


Potassium   


 


Chloride   


 


Carbon Dioxide   


 


Anion Gap   


 


BUN   


 


Creatinine   


 


Est GFR (CKD-EPI)AfAm   


 


Est GFR (CKD-EPI)NonAf   


 


POC Glucometer   96  87


 


Random Glucose   


 


Calcium   


 


Total Bilirubin   


 


AST   


 


ALT   


 


Alkaline Phosphatase   


 


Total Protein   


 


Albumin   














  02/15/20 02/15/20 02/15/20





  05:45 05:45 05:45


 


WBC   9.6 


 


RBC   3.77 


 


Hgb   9.4 L 


 


Hct   30.1 L 


 


MCV   80.0 


 


MCH   25.0 L 


 


MCHC   31.2 L 


 


RDW   19.7 H 


 


Plt Count   206 


 


MPV   7.8 


 


Absolute Neuts (auto)   7.8 


 


Neutrophils %   80.6 


 


Lymphocytes %   7.3 L 


 


Monocytes %   8.1 


 


Eosinophils %   3.7 


 


Basophils %   0.3 


 


Nucleated RBC %   0 


 


PT with INR  38.30 H  


 


INR  3.21 H  


 


Sodium    142


 


Potassium    4.1


 


Chloride    104


 


Carbon Dioxide    31


 


Anion Gap    7 L


 


BUN    33.1 H


 


Creatinine    1.8 H


 


Est GFR (CKD-EPI)AfAm    28.62


 


Est GFR (CKD-EPI)NonAf    24.69


 


POC Glucometer   


 


Random Glucose    98


 


Calcium    8.2 L


 


Total Bilirubin    0.7


 


AST    12 L


 


ALT    11 L


 


Alkaline Phosphatase    77


 


Total Protein    5.7 L


 


Albumin    2.5 L











- Problems


(1) CAD (coronary artery disease)


Code(s): I25.10 - ATHSCL HEART DISEASE OF NATIVE CORONARY ARTERY W/O ANG PCTRS 

  


Qualifiers: 


   Coronary Disease-Associated Artery/Lesion type: native artery   Native vs. 

transplanted heart: native heart   Associated angina: without angina   

Qualified Code(s): I25.10 - Atherosclerotic heart disease of native coronary 

artery without angina pectoris   





(2) CHF exacerbation


Code(s): I50.9 - HEART FAILURE, UNSPECIFIED   


Qualifiers: 


   Heart failure type: unspecified 





(3) Chronic anemia


Code(s): D64.9 - ANEMIA, UNSPECIFIED   





(4) Chronic kidney disease (CKD), stage III (moderate)


Code(s): N18.3 - CHRONIC KIDNEY DISEASE, STAGE 3 (MODERATE)   





(5) H/O: CVA (cerebrovascular accident)


Code(s): Z86.73 - PRSNL HX OF TIA (TIA), AND CEREB INFRC W/O RESID DEFICITS   





(6) Angiodysplasia of cecum


Code(s): K55.20 - ANGIODYSPLASIA OF COLON WITHOUT HEMORRHAGE   





(7) Atrial fibrillation


Code(s): I48.91 - UNSPECIFIED ATRIAL FIBRILLATION   


Qualifiers: 


   Atrial fibrillation type: permanent 





(8) Dementia


Code(s): F03.90 - UNSPECIFIED DEMENTIA WITHOUT BEHAVIORAL DISTURBANCE   


Qualifiers: 


   Dementia type: Parkinson's disease   Dementia behavioral disturbance: 

without behavioral disturbance   Qualified Code(s): G20 - Parkinson's disease; 

F02.80 - Dementia in other diseases classified elsewhere without behavioral 

disturbance; F02.80 - Dementia in other diseases classified elsewhere without 

behavioral disturbance; F02.80 - Dementia in other diseases classified 

elsewhere without behavioral disturbance   





(9) Pacemaker


Code(s): Z95.0 - PRESENCE OF CARDIAC PACEMAKER   





Assessment/Plan


SEDATING MEDS HELD/DECREASED 


SUPPLEMENTAL O2 AS NEEDED  


RATE CONTROL 


MONITOR HGB


ASPIRATION PRECAUTIONS 


MELISSA Merritt Name band;

## 2021-10-13 NOTE — ED PROVIDER NOTE - NSFOLLOWUPINSTRUCTIONS_ED_ALL_ED_FT
- Take Motrin 400-600mg every 6 hrs as needed for pain. Take with food     - Take Tylenol 650mg every 6 hrs as needed for pain.     - Rest, ice, and elevate the extremity.    - Please call to follow up with an orthopedist - contact information attached    - Advance activity as tolerated.  Continue all previously prescribed medications as directed unless otherwise instructed.  Follow up with your primary care physician in 48-72 hours- bring copies of your results.  Return to the ER for worsening or persistent symptoms, and/or ANY NEW OR CONCERNING SYMPTOMS. If you have issues obtaining follow up, please call: [phone] to obtain a doctor or specialist who takes your insurance in your area.  You may call [phone] to make an appointment with the internal medicine clinic.

## 2021-10-13 NOTE — ED PROVIDER NOTE - PHYSICAL EXAMINATION
Well appearing, in no acute distress  L lower extremity - Edema of L knee, no erythema, no TTP. Pain w/ varus stress. Negative anterior door sign. Distal pulses intact. Mild swelling of L lower leg including ankle. Ambulatory w/ antalgic gait. Well appearing, in no acute distress  L lower extremity + Edema of L knee, no erythema, no TTP. Pain w/ varus stress. Negative anterior drawer sign. Distal pulses intact. Mild swelling of L lower leg including ankle. Ambulatory w/ antalgic gait.

## 2021-10-13 NOTE — ED ADULT TRIAGE NOTE - CHIEF COMPLAINT QUOTE
Pt presents to ED ambulatory from home with c/o L knee pain and swelling x 1 day after striking knee on seat yesterday. Pt fell this AM secondary to pain denies head strike, LOC head neck or back pain.

## 2021-10-13 NOTE — ED PROVIDER NOTE - PATIENT PORTAL LINK FT
You can access the FollowMyHealth Patient Portal offered by [facility] by registering at the following website: [email or web address]. By joining [employer]’s FollowMyHealth portal, you will also be able to view your health information using other applications (apps) compatible with our system.

## 2021-10-13 NOTE — ED PROVIDER NOTE - CLINICAL SUMMARY MEDICAL DECISION MAKING FREE TEXT BOX
61 y/o F p/w L knee pain and swelling. Concern for ligamentous injury vs. less likely fracture. Will obtain X-ray, provide pain control and likely outpatient f/u.

## 2021-10-13 NOTE — ED PROVIDER NOTE - OBJECTIVE STATEMENT
61 y/o F w/ PMHx diabetes presents to the ED w/ L knee pain. Pt states yesterday she was walking sideways through an entryway when her knee gave out and fell. Pt states since then her L knee felt painful and occasionally gives out on her. Pt reports she also notices swelling of her L leg Denies head trauma, other pain or injuries. 61 y/o F w/ PMHx diabetes presents to the ED w/ L knee pain. Pt states yesterday she was walking sideways through an entryway when her knee gave out and patient fell. Pt states since then her L knee felt painful and occasionally gives out on her. Pt reports she also notices swelling of her L leg. Denies head trauma, other pain or injuries.

## 2021-10-15 ENCOUNTER — APPOINTMENT (OUTPATIENT)
Dept: ORTHOPEDIC SURGERY | Facility: CLINIC | Age: 61
End: 2021-10-15
Payer: COMMERCIAL

## 2021-10-15 VITALS
BODY MASS INDEX: 30.12 KG/M2 | WEIGHT: 170 LBS | DIASTOLIC BLOOD PRESSURE: 80 MMHG | HEIGHT: 63 IN | SYSTOLIC BLOOD PRESSURE: 125 MMHG | HEART RATE: 76 BPM

## 2021-10-15 DIAGNOSIS — M25.562 PAIN IN RIGHT KNEE: ICD-10-CM

## 2021-10-15 DIAGNOSIS — M25.561 PAIN IN RIGHT KNEE: ICD-10-CM

## 2021-10-15 PROCEDURE — 99204 OFFICE O/P NEW MOD 45 MIN: CPT

## 2021-10-15 NOTE — PHYSICAL EXAM
[LE] : Sensory: Intact in bilateral lower extremities [DP] : dorsalis pedis 2+ and symmetric bilaterally [PT] : posterior tibial 2+ and symmetric bilaterally [Normal] : Alert and in no acute distress [Poor Appearance] : well-appearing [Acute Distress] : not in acute distress [Obese] : not obese [de-identified] : The patient has no respiratory distress. Mood and affect are normal. The patient is alert and oriented to person, place and time.\par There is no pain with active or passive motion of the hips.  There is no tenderness of either hip.  Examination of the knees demonstrates mild swelling of the left knee.  Quadriceps and hamstring function are intact.  There is no instability of collateral or cruciate ligaments.  Range of motion 0 to 100 degrees left, 0 to 110 degrees right.  Joaquim test is negative.  The calves are soft and nontender.  The skin is intact.  There is no lymphedema. [de-identified] : EXAM: XR KNEE COMP 4+ VIEWS LT\par \par PROCEDURE DATE: Oct 13 2021\par \par INTERPRETATION: CLINICAL INDICATION: Left knee pain, swelling s/p injury.\par \par TECHNIQUE: 3 views left knee.\par \par COMPARISON: Bilateral knee x-ray 7/29/2021.\par \par FINDINGS:\par \par There is no acute fracture or dislocation. Mild tricompartmental arthrosis most pronounced in the medial compartment where there is overhanging osteophyte formation. Enthesophyte formation is also noted at the superior and inferior patellar poles. There is a chronic punctate osseous body noted about the fibular styloid process. There is soft tissue swelling about the medial knee joint. There is no true lateral view to evaluate for joint effusion.\par \par \par IMPRESSION:\par \par No acute fracture or dislocation.\par \par \par X-rays from October 13, 2021 and x-rays from July 29, 2021 are reviewed.  She has degenerative changes of both knees.

## 2021-10-15 NOTE — HISTORY OF PRESENT ILLNESS
[Stable] : stable [8] : a current pain level of 8/10 [Bending] : worsened by bending [Walking] : worsened by walking [Acetaminophen] : relieved by acetaminophen [Rest] : relieved by rest [de-identified] : 60 year old female active with walking, presents with Left knee pain for the past 4 days. She was walking in her home and felt her Left knee give way on her. She denies twisting her knee or feeling a pop or click. The following day her knee gave way ad fell to the ground. She did not sustain any injury and went to the ER to be evaluated for her knee. There were x rays taken. The pain in the knee is of the posterior and medial aspect of her knee. She has been medicating with Tylenol. The knee feels swollen.  \par \par medical history notable for IDDM II, denies HTN or renal impairment.

## 2021-10-15 NOTE — DISCUSSION/SUMMARY
[de-identified] : The patient has arthritic changes of both knees.  I believe she had an exacerbation of her left knee arthritis this week.  I have discussed the pathology, natural history and treatment options with her.  She is started on a course of Mobic.  Medication risks have been reviewed.  She will be reevaluated in 3 weeks.

## 2021-10-25 ENCOUNTER — APPOINTMENT (OUTPATIENT)
Dept: RHEUMATOLOGY | Facility: CLINIC | Age: 61
End: 2021-10-25

## 2021-11-05 ENCOUNTER — RX RENEWAL (OUTPATIENT)
Age: 61
End: 2021-11-05

## 2021-11-09 ENCOUNTER — NON-APPOINTMENT (OUTPATIENT)
Age: 61
End: 2021-11-09

## 2021-11-11 ENCOUNTER — APPOINTMENT (OUTPATIENT)
Dept: ORTHOPEDIC SURGERY | Facility: CLINIC | Age: 61
End: 2021-11-11
Payer: COMMERCIAL

## 2021-11-11 VITALS
WEIGHT: 170 LBS | DIASTOLIC BLOOD PRESSURE: 85 MMHG | HEIGHT: 63 IN | HEART RATE: 90 BPM | BODY MASS INDEX: 30.12 KG/M2 | SYSTOLIC BLOOD PRESSURE: 132 MMHG

## 2021-11-11 DIAGNOSIS — M25.562 PAIN IN LEFT KNEE: ICD-10-CM

## 2021-11-11 PROCEDURE — 99213 OFFICE O/P EST LOW 20 MIN: CPT

## 2021-11-11 NOTE — HISTORY OF PRESENT ILLNESS
[de-identified] : The patient returns for evaluation of her left knee.  She states that she has had significant relief from Mobic.  She still occasionally has a sensation that her knee may buckle on her.  This is the case in the right knee as well.  She denies lower extremity numbness or tingling.

## 2021-11-11 NOTE — DISCUSSION/SUMMARY
[de-identified] : The patient has arthritis of both knees.  She has had significant relief from Mobic.  She does have a sense of instability.  She does not have any ligamentous damage.  She is referred for physical therapy.  She will be reevaluated in 2 months.

## 2021-11-11 NOTE — PHYSICAL EXAM
[LE] : Sensory: Intact in bilateral lower extremities [DP] : dorsalis pedis 2+ and symmetric bilaterally [PT] : posterior tibial 2+ and symmetric bilaterally [Normal] : Alert and in no acute distress [Poor Appearance] : well-appearing [Acute Distress] : not in acute distress [Obese] : not obese [de-identified] : The patient has no respiratory distress. Mood and affect are normal. The patient is alert and oriented to person, place and time.\par There is no pain with active or passive motion of the hips.  There is no tenderness of either hip.  Examination of the knees demonstrates mild swelling of the left knee.  Quadriceps and hamstring function are intact.  There is no instability of collateral or cruciate ligaments.  Range of motion 0 to 100 degrees left, 0 to 110 degrees right.  Joaquim test is negative.  The calves are soft and nontender.  The skin is intact.  There is no lymphedema.

## 2021-11-15 ENCOUNTER — APPOINTMENT (OUTPATIENT)
Dept: INTERNAL MEDICINE | Facility: CLINIC | Age: 61
End: 2021-11-15
Payer: COMMERCIAL

## 2021-11-15 VITALS
RESPIRATION RATE: 16 BRPM | TEMPERATURE: 96.4 F | WEIGHT: 173 LBS | SYSTOLIC BLOOD PRESSURE: 126 MMHG | DIASTOLIC BLOOD PRESSURE: 82 MMHG | BODY MASS INDEX: 30.65 KG/M2 | HEIGHT: 63 IN | OXYGEN SATURATION: 98 % | HEART RATE: 86 BPM

## 2021-11-15 PROCEDURE — 36415 COLL VENOUS BLD VENIPUNCTURE: CPT

## 2021-11-15 PROCEDURE — 99396 PREV VISIT EST AGE 40-64: CPT

## 2021-11-15 NOTE — HISTORY OF PRESENT ILLNESS
[FreeTextEntry1] : wellness [de-identified] : 59 yo f with T2DM, Sarcoidosis, HTN, HLD, thyroid nodules. non obstructive CAD\par \par - Sarcoidosis: MTX only now. Off pred.\par - DM- has been better controlled\par    Only on Insulin Lantus: 32Units QHS, Humalog 24 Units with each meal. \par  - Last A1C of 8.1% 9/21\par - started on trulicity - which she found helpful\par Ran out of statin a month ago\par   - \par Recent left knee pain / instability - following with Dr Delgado\par colonoscopy 3/19\par Mammo 8/21\par Dexa: 7/21 normal\par \par Ophtho - no evidence of DM Retinopathy

## 2021-11-15 NOTE — PLAN
[FreeTextEntry1] : Non obstructive CAD-  inc statin to 20mg daily\par - encourage f/up with Dr Contreras\par \par DM2 - better controlled\par  refill trulicity\par  f/up with Dr Sauceda\par \par Sarcoidosis:\par - MTX per Dr Galvan\par \par HCM- uptodate\par \par Knee and back pain- rec 1-2 sessions of PT and then home regimen

## 2021-11-17 LAB
25(OH)D3 SERPL-MCNC: 13.5 NG/ML
ALBUMIN SERPL ELPH-MCNC: 3.9 G/DL
ALP BLD-CCNC: 108 U/L
ALT SERPL-CCNC: 19 U/L
ANION GAP SERPL CALC-SCNC: 18 MMOL/L
APPEARANCE: CLEAR
AST SERPL-CCNC: 19 U/L
BASOPHILS # BLD AUTO: 0.02 K/UL
BASOPHILS NFR BLD AUTO: 0.5 %
BILIRUB SERPL-MCNC: 0.3 MG/DL
BILIRUBIN URINE: NEGATIVE
BLOOD URINE: NEGATIVE
BUN SERPL-MCNC: 16 MG/DL
CALCIUM SERPL-MCNC: 9.3 MG/DL
CHLORIDE SERPL-SCNC: 106 MMOL/L
CHOLEST SERPL-MCNC: 219 MG/DL
CO2 SERPL-SCNC: 18 MMOL/L
COLOR: NORMAL
CREAT SERPL-MCNC: 0.6 MG/DL
CREAT SPEC-SCNC: 79 MG/DL
EOSINOPHIL # BLD AUTO: 0.08 K/UL
EOSINOPHIL NFR BLD AUTO: 1.8 %
GLUCOSE QUALITATIVE U: NEGATIVE
GLUCOSE SERPL-MCNC: 176 MG/DL
HCT VFR BLD CALC: 41.5 %
HCV AB SER QL: NONREACTIVE
HCV S/CO RATIO: 0.09 S/CO
HDLC SERPL-MCNC: 61 MG/DL
HGB BLD-MCNC: 13.2 G/DL
IMM GRANULOCYTES NFR BLD AUTO: 0.2 %
KETONES URINE: NORMAL
LDLC SERPL CALC-MCNC: 133 MG/DL
LEUKOCYTE ESTERASE URINE: NEGATIVE
LYMPHOCYTES # BLD AUTO: 1.05 K/UL
LYMPHOCYTES NFR BLD AUTO: 23.6 %
MAN DIFF?: NORMAL
MCHC RBC-ENTMCNC: 31.8 GM/DL
MCHC RBC-ENTMCNC: 31.9 PG
MCV RBC AUTO: 100.2 FL
MICROALBUMIN 24H UR DL<=1MG/L-MCNC: <1.2 MG/DL
MICROALBUMIN/CREAT 24H UR-RTO: NORMAL MG/G
MONOCYTES # BLD AUTO: 0.52 K/UL
MONOCYTES NFR BLD AUTO: 11.7 %
NEUTROPHILS # BLD AUTO: 2.76 K/UL
NEUTROPHILS NFR BLD AUTO: 62.2 %
NITRITE URINE: NEGATIVE
NONHDLC SERPL-MCNC: 158 MG/DL
PH URINE: 6.5
PLATELET # BLD AUTO: 229 K/UL
POTASSIUM SERPL-SCNC: 4.7 MMOL/L
PROT SERPL-MCNC: 6.5 G/DL
PROTEIN URINE: NEGATIVE
RBC # BLD: 4.14 M/UL
RBC # FLD: 13.7 %
SODIUM SERPL-SCNC: 142 MMOL/L
SPECIFIC GRAVITY URINE: 1.02
TRIGL SERPL-MCNC: 127 MG/DL
UROBILINOGEN URINE: NORMAL
WBC # FLD AUTO: 4.44 K/UL

## 2021-11-19 ENCOUNTER — APPOINTMENT (OUTPATIENT)
Dept: HEART AND VASCULAR | Facility: CLINIC | Age: 61
End: 2021-11-19

## 2021-11-22 ENCOUNTER — APPOINTMENT (OUTPATIENT)
Dept: OBGYN | Facility: CLINIC | Age: 61
End: 2021-11-22

## 2021-12-20 ENCOUNTER — APPOINTMENT (OUTPATIENT)
Dept: RHEUMATOLOGY | Facility: CLINIC | Age: 61
End: 2021-12-20
Payer: COMMERCIAL

## 2021-12-20 VITALS
HEART RATE: 103 BPM | OXYGEN SATURATION: 98 % | WEIGHT: 173 LBS | BODY MASS INDEX: 30.65 KG/M2 | HEIGHT: 63 IN | TEMPERATURE: 98.2 F | SYSTOLIC BLOOD PRESSURE: 126 MMHG | DIASTOLIC BLOOD PRESSURE: 87 MMHG

## 2021-12-20 DIAGNOSIS — D86.1 SARCOIDOSIS OF LYMPH NODES: ICD-10-CM

## 2021-12-20 PROCEDURE — 99214 OFFICE O/P EST MOD 30 MIN: CPT

## 2021-12-20 NOTE — HISTORY OF PRESENT ILLNESS
[Fatigue] : fatigue [Dry Mouth] : dry mouth [Arthralgias] : arthralgias [Difficulty Walking] : difficulty walking [Muscle Weakness] : muscle weakness [Muscle Spasms] : muscle spasms [Muscle Cramping] : muscle cramping [Dry Eyes] : dry eyes [___ Month(s) Ago] : [unfilled] month(s) ago [FreeTextEntry1] : Follow up: 12/20/21 \par \par 59 yo woman with uncontrolled T2DM (A1c 10.9%) with diabetic neuropathy and thyroid nodules, Hep B core Ab positive \par Sarcoidosis, diagnosed in 2019, when she presented with 30Ibs weight loss for 3 months,  CXR showed adenopathy, and mediastinoscopy showed noncaseating granulomas. Prednisone tapered off,  but placed back with recurrence of similar symptoms, however resolved after discovery of lactic acidosis, was on prednisone 15mg since March 2021 and on prior visit we discussed steroid sparing agent and pt agreed with MTX trial. \par On MTX for 4 since August, 2021, run out 3 weeks ago, tolerating MTX without side effects, she was able to taper down prednisone and completely off since last month. Stable follow up CBC and Chem from 11/2021\par Found vit D deficiency never picked up RX from pharmacy. \par \par \par \par Medications; \par prednisone ( tapered and off since Nov, 2021) \par MTX 7.5mg q weekly \par Folic acid 1mg daily \par \par \par \par \par Follow up: 8/30/21 \par \par 59 yo woman with uncontrolled T2DM (A1c 10.9%) with diabetic neuropathy and thyroid nodules, Hep B core Ab positive \par Sarcoidosis, diagnosed in 2019, when she presented with 30Ibs weight loss for 3 months,  CXR showed adenopathy, and mediastinoscopy showed noncaseating granulomas. Prednisone tapered off,  but placed back with recurrence of similar symptoms, however resolved after discovery of lactic acidosis, on prednisone 15mg since March 2021 and on prior visit we discussed steroid sparing agent and pt agreed with MTX trial. \par On MTX for 4 weeks now and denies any side effects. \par \par \par Medications; \par prednisone 15\par MTX 7.5mg q weekly \par Folic acid 1mg daily \par \par \par \par \par Initial visit: 7/29/21 \par \par Referred by Dr. Parham for Rheumatology consultation \par \par \par 59 yo woman with uncontrolled T2DM (A1c 10.9%) with diabetic neuropathy and thyroid nodules\par Sarcoidosis, diagnosed in 2019, when she presented with 30Ibs weight loss for 3 months,  CXR showed adenopathy, and mediastinoscopy showed noncaseating granulomas. Additional symptoms include muscle cramps, dry mouth, change in taste, fatigue,  chest discomfort. No vision changes. No arthritis/arthralgia. No EN. No fever but did have chills at the beginning. She was started pred 30mg and Dr Gayle started HBV ppx, as per note but pt states she does not remember taking any prophylaxis\par Was on prednisone until tapered off by August 2020 and again restarted and currently on prednisone 15mg since March as ptr was experiencing some chest tightness and though due to sarcoidosis flare. \par Pt follows with ENT Dr. Shari Parham for  chronic right facial swelling.  Treated w/ Augmentin for sinusitis in early April 2021. Found parotid swelling and LAD- likely related to Sarcoidosis. \par Pt has never tried any steroid sparing agents in the past and never seen Rheumatologist. \par Pt reports arthralgia of elbows and knees, but denies joint swelling, AM stiffness \par Denies skin rash. \par She had recent work up such as CT angio coronaries- where noted mildly prominent b/l mediastinal lymph nodes, significantly decreased in size compare to prior study. \par MIld to moderate stenosis of in proximal :AD, proximal LCX, OM1 and mid RCA \par She had recent hospitalization for metformin induced lactic acidosis. \par  [Anorexia] : no anorexia [Weight Loss] : no weight loss [Malaise] : no malaise [Fever] : no fever [Chills] : no chills [Depression] : no depression [Malar Facial Rash] : no malar facial rash [Skin Lesions] : no lesions [Skin Nodules] : no skin nodules [Oral Ulcers] : no oral ulcers [Cough] : no cough [Dysphonia] : no dysphonia [Dysphagia] : no dysphagia [Shortness of Breath] : no shortness of breath [Chest Pain] : no chest pain [Joint Swelling] : no joint swelling [Joint Warmth] : no joint warmth [Joint Deformity] : no joint deformity [Decreased ROM] : no decreased range of motion [Morning Stiffness] : no morning stiffness [Falls] : no falls [Dyspnea] : no dyspnea [Myalgias] : no myalgias [Visual Changes] : no visual changes [Eye Pain] : no eye pain [Eye Redness] : no eye redness

## 2021-12-20 NOTE — PHYSICAL EXAM
[General Appearance - Alert] : alert [General Appearance - In No Acute Distress] : in no acute distress [General Appearance - Well Nourished] : well nourished [General Appearance - Well Developed] : well developed [General Appearance - Well-Appearing] : healthy appearing [Neck Appearance] : the appearance of the neck was normal [Respiration, Rhythm And Depth] : normal respiratory rhythm and effort [Exaggerated Use Of Accessory Muscles For Inspiration] : no accessory muscle use [Heart Rate And Rhythm] : heart rate was normal and rhythm regular [Edema] : there was no peripheral edema [Abnormal Walk] : normal gait [Nail Clubbing] : no clubbing  or cyanosis of the fingernails [Musculoskeletal - Swelling] : no joint swelling seen [Motor Tone] : muscle strength and tone were normal [Skin Color & Pigmentation] : normal skin color and pigmentation [] : no rash [Skin Lesions] : no skin lesions [Oriented To Time, Place, And Person] : oriented to person, place, and time [FreeTextEntry1] : R knee tender, no synovitis or limited ROM

## 2021-12-20 NOTE — ASSESSMENT
[FreeTextEntry1] : 61 yo woman with uncontrolled T2DM (A1c 10.9%) with diabetic neuropathy and thyroid nodules, Hep B core Ab positive \par Sarcoidosis, diagnosed in 2019, when she presented with 30Ibs weight loss for 3 months,  CXR showed adenopathy, and mediastinoscopy showed noncaseating granulomas. Additional symptoms include muscle cramps, dry mouth, change in taste, fatigue,  chest discomfort. No vision changes. No arthritis/arthralgia. No EN. No fever but did have chills at the beginning. She was started pred 30mg and Dr Gayle started HBV ppx, as per note but pt states she does not remember taking any prophylaxis\par Was on prednisone until tapered off by Aug 2020 and restarted  prednisone 15mg in March, 2021 as ptr was experiencing some chest tightness and though due to sarcoidosis flare, however resolved after discovery of lactic acidosis. \par She had recent work up such as CT angio coronaries- where noted mildly prominent b/l mediastinal lymph nodes, significantly decreased in size compare to prior study. \par CAD: MIld to moderate stenosis of in proximal :AD, proximal LCX, OM1 and mid RCA \par \par 1. Sarcoidosis: biopsy proven- was on chronic steroids which subsequently tapered off. \par Given hx of poorly controlled DM and risk for Osteoporosis from chronic steroid use added treatment with steroid sparing agents- MTX and currently she is taking 7.5mg weekly ( run out 3 week ago)  \par Rx renewed and c/w current dose along with folic acid, stable sarcoidosis, no flare. \par \par 2. Hep B core Ab; saw Dr. Gayle and she supposed to be on tenofovir for prophylaxis, but pt states never took it. \par Recommended to contact GI for the medication discussion. \par \par 3. Bone health \par I reviewed the DXA done on  July 21, 2021- normal bone density. \par Vit D deficiency - recommended to start weekly ergocalciferol. \par \par \par f/u in 3 months \par

## 2022-01-03 ENCOUNTER — RX RENEWAL (OUTPATIENT)
Age: 62
End: 2022-01-03

## 2022-01-19 ENCOUNTER — APPOINTMENT (OUTPATIENT)
Dept: OTOLARYNGOLOGY | Facility: CLINIC | Age: 62
End: 2022-01-19

## 2022-02-01 ENCOUNTER — RX CHANGE (OUTPATIENT)
Age: 62
End: 2022-02-01

## 2022-02-07 ENCOUNTER — APPOINTMENT (OUTPATIENT)
Dept: ENDOCRINOLOGY | Facility: CLINIC | Age: 62
End: 2022-02-07
Payer: COMMERCIAL

## 2022-02-07 VITALS
HEART RATE: 82 BPM | OXYGEN SATURATION: 99 % | SYSTOLIC BLOOD PRESSURE: 134 MMHG | TEMPERATURE: 97.3 F | BODY MASS INDEX: 29.41 KG/M2 | WEIGHT: 166 LBS | DIASTOLIC BLOOD PRESSURE: 90 MMHG

## 2022-02-07 LAB
GLUCOSE BLDC GLUCOMTR-MCNC: 144
HBA1C MFR BLD HPLC: 6.7

## 2022-02-07 PROCEDURE — 82962 GLUCOSE BLOOD TEST: CPT

## 2022-02-07 PROCEDURE — 99205 OFFICE O/P NEW HI 60 MIN: CPT | Mod: 25

## 2022-02-07 PROCEDURE — 83036 HEMOGLOBIN GLYCOSYLATED A1C: CPT | Mod: QW

## 2022-02-07 RX ORDER — GLUCOSAM/CHON-MSM1/C/MANG/BOSW 500-416.6
TABLET ORAL
Qty: 1 | Refills: 0 | Status: ACTIVE | COMMUNITY
Start: 2022-02-07 | End: 1900-01-01

## 2022-02-07 RX ORDER — SYRING-NEEDL,DISP,INSUL,0.3 ML 29 G X1/2"
SYRINGE, EMPTY DISPOSABLE MISCELLANEOUS
Refills: 0 | Status: DISCONTINUED | COMMUNITY
End: 2022-02-07

## 2022-02-07 RX ORDER — BLOOD-GLUCOSE,RECEIVER,CONT
EACH MISCELLANEOUS
Qty: 1 | Refills: 0 | Status: DISCONTINUED | COMMUNITY
Start: 2021-07-20 | End: 2022-02-07

## 2022-02-07 RX ORDER — BLOOD-GLUCOSE TRANSMITTER
EACH MISCELLANEOUS
Qty: 2 | Refills: 0 | Status: DISCONTINUED | COMMUNITY
Start: 2021-07-20 | End: 2022-02-07

## 2022-02-07 RX ORDER — PREDNISONE 10 MG/1
10 TABLET ORAL
Qty: 45 | Refills: 1 | Status: DISCONTINUED | COMMUNITY
Start: 2021-03-29 | End: 2022-02-07

## 2022-02-07 RX ORDER — DULAGLUTIDE 0.75 MG/.5ML
0.75 INJECTION, SOLUTION SUBCUTANEOUS
Qty: 1 | Refills: 5 | Status: DISCONTINUED | COMMUNITY
Start: 2021-09-20 | End: 2022-02-07

## 2022-02-07 RX ORDER — BLOOD-GLUCOSE SENSOR
EACH MISCELLANEOUS
Qty: 3 | Refills: 3 | Status: DISCONTINUED | COMMUNITY
Start: 2021-07-20 | End: 2022-02-07

## 2022-02-07 RX ORDER — BLOOD-GLUCOSE METER
W/DEVICE EACH MISCELLANEOUS
Qty: 1 | Refills: 0 | Status: ACTIVE | COMMUNITY
Start: 2022-02-07 | End: 1900-01-01

## 2022-02-07 NOTE — ASSESSMENT
[FreeTextEntry1] : Patient is a 62 yo woman with uncontrolled type 2 diabetes, HTN, HLD, thyroid nodules here for endocrine care. \par \par 1. Uncontrolled Type 2 DM\par A1c today 6.7%.\par Continue with Lantus/Basaglar 24 units at bedtime\par Trulicity is no longer covered by insurance, will send prescription for Ozempic 0.5 mg once weekly.\par Uses Humalog 8 units as needed for hypoglycemia.\par Encourage patient to monitor glucose more frequently than once a day.\par dilated eye exam up to date\par monofilament testing done\par No personal/family history of medullary thyroid cancer/MEN\par \par 2.Thyroid nodules\par repeat thyroid US February 2022\par clinically and chemically euthyroid\par \par 3.  Hypertension\par BP today 134/90\par Continue with losartan 25 mg once daily\par \par 4.  Hyperlipidemia \par LDL goal less than 70 mg/dL\par Continue to monitor lipid profile\par \par 4.  Osteoporosis screening\par Done with DEXA 7/20/2021\par Repeat in 2 years\par \par FU in 4-6 months\par Call office if meds not covered.  [Diabetes Foot Care] : diabetes foot care [Long Term Vascular Complications] : long term vascular complications of diabetes [Carbohydrate Consistent Diet] : carbohydrate consistent diet [Importance of Diet and Exercise] : importance of diet and exercise to improve glycemic control, achieve weight loss and improve cardiovascular health [Exercise/Effect on Glucose] : exercise/effect on glucose [Hypoglycemia Management] : hypoglycemia management [Glucagon Use] : glucagon use [Ketone Testing] : ketone testing [Action and use of Insulin] : action and use of short and long-acting insulin [Self Monitoring of Blood Glucose] : self monitoring of blood glucose [Insulin Self-Administration] : insulin self-administration [Injection Technique, Storage, Sharps Disposal] : injection technique, storage, and sharps disposal [Sick-Day Management] : sick-day management [Retinopathy Screening] : Patient was referred to ophthalmology for retinopathy screening

## 2022-02-07 NOTE — DATA REVIEWED
[FreeTextEntry1] : EXAM:  US THYROID ONLY\par \par PROCEDURE DATE:  02/12/2021\par \par \par \par INTERPRETATION:  THYROID ULTRASOUND with Doppler dated 2/12/2021 1:44 PM\par \par History: Follow-up of left thyroid nodule status post FNA in 2019.\par \par Technique: Ultrasound evaluation of the thyroid was performed in the axial and sagittal projections. Color Doppler evaluation was also performed.\par \par Prior study: Ultrasound thyroid dated 3/29/2019\par \par Findings:\par \par RIGHT LOBE:\par Dimensions: 5 x 0.9 x 1.8 cm (sagittal x AP x transverse)\par Echotexture: Homogenous\par Vascularity: Normal vascularity\par The right lobe contains 3 spongiform nodules.\par \par Nodule 1:\par Location: Midpole medial\par Dimensions: 0.4 x 0.4 x 0.3 cm (sagittal x AP x transverse) grossly stable in size.\par Composition: Spongiform\par For solid nodules or for the solid portion of a partially cystic nodule, does the solid portion have any of the following suspicious features?\par -  No: Hypoechoic\par -  No: Microcalcifications\par -  No: Irregular margin (infiltrative or microlobulated)\par -  No: Taller than wide (AP dimension > transverse)\par -  No: Extrathyroidal extension\par -  No: Interrupted rim calcification with soft tissue extrusion\par \par Nodule 2:\par Location: Midpole lateral\par Dimensions: 0.5 x 0.3 x 0.4cm (sagittal x AP x transverse), grossly stable in size.\par Composition: Spongiform\par For solid nodules or for the solid portion of a partially cystic nodule, does the solid portion have any of the following suspicious features?\par -  No: Hypoechoic\par -  No: Microcalcifications\par -  No: Irregular margin (infiltrative or microlobulated)\par -  No: Taller than wide (AP dimension > transverse)\par -  No: Extrathyroidal extension\par -  No: Interrupted rim calcification with soft tissue extrusion\par \par Nodule 3:\par Location: Midpole lateral\par Dimensions: 0.5 x 0.3 x 0.5  cm (sagittal x AP x transverse), previously 0.2 x 0.2 x 0.2 cm\par Composition: Spongiform\par For solid nodules or for the solid portion of a partially cystic nodule, does the solid portion have any of the following suspicious features?\par -  No: Hypoechoic\par -  No: Microcalcifications\par -  No: Irregular margin (infiltrative or microlobulated)\par -  No: Taller than wide (AP dimension > transverse)\par -  No: Extrathyroidal extension\par -  No: Interrupted rim calcification with soft tissue extrusion\par \par \par LEFT LOBE:\par Dimensions: 4.4 x 0.7 x 1.6 cm (sagittal x AP x transverse)\par Echotexture: Homogenous\par Vascularity: Normal vascularity\par The left lobe contains no visible nodules.\par \par \par ISTHMUS:\par Dimensions: 0.2 cm AP\par The isthmus lobe contains no visible nodules.\par \par \par Cervical Lymph Node Evaluation: Right neck level 5 lymph node measuring 1 x 0.3 x 0.6 cm and a left neck level 2 lymph node measuring 1.4 x 0.6 x 1 cm without suspicious features, likely reactive. Left neck level 2 No abnormal lymph nodes are identified in the neck.\par \par Location: size, calcification, cystic area, absence of central hilum, round shape, abnormal blood flow\par \par Parathyroid Examination: Parathyroid glands not visualized.\par \par IMPRESSION:\par \par Since 3/29/2019, there has been resolution of left thyroid lobe nodule.\par 3 spongiform nodules in the right thyroid lobe do not meet the  criteria for FNA biopsy.\par \par \par ----------------------------------------------------------------\par RECOMMENDATIONS ARE BASED UPON THE 2015 [employer] MANAGEMENT GUIDELINES. Thresholds for recommended fine needle aspiration are as follows:\par *  Solid nodule with one or more suspicious sonographic features greater than or equal to 1.0-cm\par *  Solid nodule without suspicious sonographic features greater than or equal to 1.5-cm\par *  Partially cystic nodule in which the solid component has one or more suspicious sonographic features greater than or equal to 1.0-cm\par *  Partially cystic nodule with eccentric solid area(s) without suspicious sonographic features greater than or equal to 1.5-cm\par *  Partially cystic nodule without suspicious sonographic features greater than or equal to 2.0-cm\par *  Spongiform nodule greater than or equal to 2.0-cm\par \par Thank you for the opportunity to participate in the care of this patient.\par \par SEPIDEH OLIVERA M.D., RADIOLOGY RESIDENT\par This document has been electronically signed.\par CHERYL BARRIENTOS MD; Attending Radiologist\par This document has been electronically signed. Feb 12 2021  4:48PM

## 2022-02-07 NOTE — RESULTS/DATA
[L1 - L4] : L1 - L4 [BMD ___ g/cm2] : BMD: [unfilled] g/cm2 [T-Score ___] : T-score: [unfilled] [FreeTextEntry2] : 7/20/2021

## 2022-02-07 NOTE — HISTORY OF PRESENT ILLNESS
[FreeTextEntry1] : Patient of Dr. Amita Sauceda.  Now transferring to my care\par \par 61-year-old woman with uncontrolled type 2 diabetes, A1c 10.9%, thyroid nodule here for endocrinology follow-up, today 02/07/2022 is 6.7%\par \par Diagnosed in 2017, managed by PCP.  Complicated by neuropathy.  Diagnosis with sarcoidosis and on steroid taper intermittently. Currently not on steroid, on methotrexate. \par \par Patient is currently on:\par Basaglar at night 24 units at bedtime. \par Trulicity 0.75mg once weekly. \par Humalog only use very when her glucose is high. She usually only takes 8 units depending on her glucose level. \par \par Checks glucose once daily in the morning\par AM: 140s\par \par 24 hour food recall: \par Breakfast: Avocado and cup of tea\par Lunch: she skips lunch most days.  Only eats when she eats only when she feels hungry, ham and chips. \par Dinner: Sometimes rice and soup, she noticed if she eats anything with flour will increase her glucose.  \par Dilated eye exam: Last seen about a couple months ago. \par \par Thyroid nodule, subcentimeter nodule that does not meet criteria for FNA.\par \par \par

## 2022-02-08 ENCOUNTER — RX RENEWAL (OUTPATIENT)
Age: 62
End: 2022-02-08

## 2022-02-10 ENCOUNTER — APPOINTMENT (OUTPATIENT)
Dept: PULMONOLOGY | Facility: CLINIC | Age: 62
End: 2022-02-10

## 2022-02-14 ENCOUNTER — APPOINTMENT (OUTPATIENT)
Dept: ENDOCRINOLOGY | Facility: CLINIC | Age: 62
End: 2022-02-14

## 2022-03-08 RX ORDER — INSULIN GLARGINE 100 [IU]/ML
100 INJECTION, SOLUTION SUBCUTANEOUS
Qty: 4 | Refills: 3 | Status: DISCONTINUED | COMMUNITY
Start: 2022-02-03 | End: 2022-03-08

## 2022-03-09 ENCOUNTER — RX CHANGE (OUTPATIENT)
Age: 62
End: 2022-03-09

## 2022-03-13 NOTE — ED PROVIDER NOTE - ATTENDING CONTRIBUTION TO CARE
Yesi attending Marina: 56yF no PMH, born in [facility] sent in by PMD for abnl outpatient cxr showing R apical infiltrate vs mass. Pt reports seevral days of dry cough, no hemoptysis with preceding flu-like illness (fever and myalgias, since resolved). Denies chest pain, fever, chills, night sweats, weight loss, recent travel, known TB exposure. Never smoker. On exam, VSS, well-appearing, lungs clear, MMM, abdomen soft/NT. Will place on airborne isolation given R apical abnormality on outpatient cxr, will obtain labs including QuantiFeron gold, CT chest better evaluate lung findings and reassess.

## 2022-03-17 ENCOUNTER — RX CHANGE (OUTPATIENT)
Age: 62
End: 2022-03-17

## 2022-03-22 ENCOUNTER — RX CHANGE (OUTPATIENT)
Age: 62
End: 2022-03-22

## 2022-04-05 RX ORDER — BLOOD-GLUCOSE METER
W/DEVICE KIT MISCELLANEOUS
Qty: 1 | Refills: 0 | Status: ACTIVE | COMMUNITY
Start: 2022-04-05 | End: 1900-01-01

## 2022-04-11 ENCOUNTER — APPOINTMENT (OUTPATIENT)
Dept: RHEUMATOLOGY | Facility: CLINIC | Age: 62
End: 2022-04-11
Payer: MEDICAID

## 2022-04-11 VITALS
BODY MASS INDEX: 29.23 KG/M2 | WEIGHT: 165 LBS | DIASTOLIC BLOOD PRESSURE: 81 MMHG | HEART RATE: 88 BPM | OXYGEN SATURATION: 100 % | RESPIRATION RATE: 14 BRPM | TEMPERATURE: 98 F | HEIGHT: 63 IN | SYSTOLIC BLOOD PRESSURE: 119 MMHG

## 2022-04-11 PROCEDURE — 99214 OFFICE O/P EST MOD 30 MIN: CPT | Mod: 25

## 2022-04-11 NOTE — PHYSICAL EXAM
[General Appearance - Alert] : alert [General Appearance - In No Acute Distress] : in no acute distress [General Appearance - Well Nourished] : well nourished [General Appearance - Well Developed] : well developed [General Appearance - Well-Appearing] : healthy appearing [Neck Appearance] : the appearance of the neck was normal [Respiration, Rhythm And Depth] : normal respiratory rhythm and effort [Exaggerated Use Of Accessory Muscles For Inspiration] : no accessory muscle use [Heart Rate And Rhythm] : heart rate was normal and rhythm regular [Abnormal Walk] : normal gait [Nail Clubbing] : no clubbing  or cyanosis of the fingernails [Musculoskeletal - Swelling] : no joint swelling seen [Motor Tone] : muscle strength and tone were normal [Skin Color & Pigmentation] : normal skin color and pigmentation [] : no rash [Skin Lesions] : no skin lesions [Oriented To Time, Place, And Person] : oriented to person, place, and time [FreeTextEntry1] : R knee tender, no synovitis or limited ROM

## 2022-04-11 NOTE — HISTORY OF PRESENT ILLNESS
[___ Month(s) Ago] : [unfilled] month(s) ago [Fatigue] : fatigue [Dry Mouth] : dry mouth [Arthralgias] : arthralgias [Difficulty Walking] : difficulty walking [Muscle Weakness] : muscle weakness [Muscle Spasms] : muscle spasms [Muscle Cramping] : muscle cramping [Dry Eyes] : dry eyes [FreeTextEntry1] : Follow up: 4/11/22 \par \par 60 yo woman with uncontrolled T2DM (A1c 10.9%) with diabetic neuropathy and thyroid nodules, Hep B core Ab positive \par Sarcoidosis. Treated with steroids, then tapered, stable pulm symptoms.  \par On MTX  since August, 2021.  Stable follow up CBC and Chem from 11/2021\par vit D deficiency \par Doing well, no complaints. \par \par Medications; \par prednisone ( tapered and off since Nov, 2021) \par MTX 7.5mg q weekly \par Folic acid 1mg daily \par Vit D ergocalciferol weekly \par \par \par \par \par Follow up: 12/20/21 \par \par 61 yo woman with uncontrolled T2DM (A1c 10.9%) with diabetic neuropathy and thyroid nodules, Hep B core Ab positive \par Sarcoidosis, diagnosed in 2019, when she presented with 30Ibs weight loss for 3 months,  CXR showed adenopathy, and mediastinoscopy showed noncaseating granulomas. Prednisone tapered off,  but placed back with recurrence of similar symptoms, however resolved after discovery of lactic acidosis, was on prednisone 15mg since March 2021 and on prior visit we discussed steroid sparing agent and pt agreed with MTX trial. \par On MTX for 4 since August, 2021, run out 3 weeks ago, tolerating MTX without side effects, she was able to taper down prednisone and completely off since last month. Stable follow up CBC and Chem from 11/2021\par Found vit D deficiency never picked up RX from pharmacy. \par \par \par \par Medications; \par prednisone ( tapered and off since Nov, 2021) \par MTX 7.5mg q weekly \par Folic acid 1mg daily \par \par \par \par \par Follow up: 8/30/21 \par \par 61 yo woman with uncontrolled T2DM (A1c 10.9%) with diabetic neuropathy and thyroid nodules, Hep B core Ab positive \par Sarcoidosis, diagnosed in 2019, when she presented with 30Ibs weight loss for 3 months,  CXR showed adenopathy, and mediastinoscopy showed noncaseating granulomas. Prednisone tapered off,  but placed back with recurrence of similar symptoms, however resolved after discovery of lactic acidosis, on prednisone 15mg since March 2021 and on prior visit we discussed steroid sparing agent and pt agreed with MTX trial. \par On MTX for 4 weeks now and denies any side effects. \par \par \par Medications; \par prednisone 15\par MTX 7.5mg q weekly \par Folic acid 1mg daily \par \par \par \par \par Initial visit: 7/29/21 \par \par Referred by Dr. Parham for Rheumatology consultation \par \par \par 61 yo woman with uncontrolled T2DM (A1c 10.9%) with diabetic neuropathy and thyroid nodules\par Sarcoidosis, diagnosed in 2019, when she presented with 30Ibs weight loss for 3 months,  CXR showed adenopathy, and mediastinoscopy showed noncaseating granulomas. Additional symptoms include muscle cramps, dry mouth, change in taste, fatigue,  chest discomfort. No vision changes. No arthritis/arthralgia. No EN. No fever but did have chills at the beginning. She was started pred 30mg and Dr Gayle started HBV ppx, as per note but pt states she does not remember taking any prophylaxis\par Was on prednisone until tapered off by August 2020 and again restarted and currently on prednisone 15mg since March as ptr was experiencing some chest tightness and though due to sarcoidosis flare. \par Pt follows with ENT Dr. Shari Parham for  chronic right facial swelling.  Treated w/ Augmentin for sinusitis in early April 2021. Found parotid swelling and LAD- likely related to Sarcoidosis. \par Pt has never tried any steroid sparing agents in the past and never seen Rheumatologist. \par Pt reports arthralgia of elbows and knees, but denies joint swelling, AM stiffness \par Denies skin rash. \par She had recent work up such as CT angio coronaries- where noted mildly prominent b/l mediastinal lymph nodes, significantly decreased in size compare to prior study. \par MIld to moderate stenosis of in proximal :AD, proximal LCX, OM1 and mid RCA \par She had recent hospitalization for metformin induced lactic acidosis. \par  [Anorexia] : no anorexia [Weight Loss] : no weight loss [Malaise] : no malaise [Fever] : no fever [Chills] : no chills [Depression] : no depression [Malar Facial Rash] : no malar facial rash [Skin Lesions] : no lesions [Skin Nodules] : no skin nodules [Oral Ulcers] : no oral ulcers [Cough] : no cough [Dysphonia] : no dysphonia [Dysphagia] : no dysphagia [Shortness of Breath] : no shortness of breath [Chest Pain] : no chest pain [Joint Swelling] : no joint swelling [Joint Warmth] : no joint warmth [Joint Deformity] : no joint deformity [Decreased ROM] : no decreased range of motion [Morning Stiffness] : no morning stiffness [Falls] : no falls [Dyspnea] : no dyspnea [Myalgias] : no myalgias [Visual Changes] : no visual changes [Eye Pain] : no eye pain [Eye Redness] : no eye redness

## 2022-04-11 NOTE — ASSESSMENT
[FreeTextEntry1] : 62 yo woman with uncontrolled T2DM (A1c 10.9%) with diabetic neuropathy and thyroid nodules, Hep B core Ab positive \par Sarcoidosis, diagnosed in 2019, when she presented with 30Ibs weight loss for 3 months,  CXR showed adenopathy, and mediastinoscopy showed noncaseating granulomas. Additional symptoms include muscle cramps, dry mouth, change in taste, fatigue,  chest discomfort. No vision changes. No arthritis/arthralgia. No EN. No fever but did have chills at the beginning. She was started pred 30mg and Dr Gayle started HBV ppx, as per note but pt states she does not remember taking any prophylaxis\par Was on prednisone until tapered off by Aug 2020 and restarted  prednisone 15mg in March, 2021 as ptr was experiencing some chest tightness and though due to sarcoidosis flare, however resolved after discovery of lactic acidosis. \par She had recent work up such as CT angio coronaries- where noted mildly prominent b/l mediastinal lymph nodes, significantly decreased in size compare to prior study. \par CAD: MIld to moderate stenosis of in proximal :AD, proximal LCX, OM1 and mid RCA \par \par 1. Sarcoidosis: biopsy proven- was on chronic steroids which subsequently tapered off. \par Given hx of poorly controlled DM and risk for Osteoporosis from chronic steroid use added treatment with steroid sparing agents- MTX and currently she is taking 7.5mg weekly\par Rx renewed and c/w current dose along with folic acid, stable sarcoidosis, no flare. \par check follow up labs today ( done during the visit today) \par \par 2. Bone health \par DXA done on  July 21, 2021- normal bone density. \par Vit D deficiency - recommended to start weekly ergocalciferol. \par Will recheck Vit D level today along with 1,25 Vit D level \par \par \par f/u in 6 months \par

## 2022-04-12 ENCOUNTER — TRANSCRIPTION ENCOUNTER (OUTPATIENT)
Age: 62
End: 2022-04-12

## 2022-04-12 LAB
24R-OH-CALCIDIOL SERPL-MCNC: 92.4 PG/ML
25(OH)D3 SERPL-MCNC: 29.6 NG/ML
ALBUMIN SERPL ELPH-MCNC: 4.7 G/DL
ALP BLD-CCNC: 135 U/L
ALT SERPL-CCNC: 23 U/L
ANION GAP SERPL CALC-SCNC: 12 MMOL/L
AST SERPL-CCNC: 20 U/L
BASOPHILS # BLD AUTO: 0.02 K/UL
BASOPHILS NFR BLD AUTO: 0.4 %
BILIRUB SERPL-MCNC: 0.4 MG/DL
BUN SERPL-MCNC: 10 MG/DL
CALCIUM SERPL-MCNC: 9.7 MG/DL
CHLORIDE SERPL-SCNC: 101 MMOL/L
CO2 SERPL-SCNC: 26 MMOL/L
CREAT SERPL-MCNC: 0.6 MG/DL
CRP SERPL-MCNC: 7 MG/L
EGFR: 101 ML/MIN/1.73M2
EOSINOPHIL # BLD AUTO: 0.16 K/UL
EOSINOPHIL NFR BLD AUTO: 3.1 %
ERYTHROCYTE [SEDIMENTATION RATE] IN BLOOD BY WESTERGREN METHOD: 31 MM/HR
GLUCOSE SERPL-MCNC: 99 MG/DL
HCT VFR BLD CALC: 41.8 %
HGB BLD-MCNC: 13.5 G/DL
IMM GRANULOCYTES NFR BLD AUTO: 0.4 %
LYMPHOCYTES # BLD AUTO: 1.31 K/UL
LYMPHOCYTES NFR BLD AUTO: 25.7 %
MAN DIFF?: NORMAL
MCHC RBC-ENTMCNC: 31.8 PG
MCHC RBC-ENTMCNC: 32.3 GM/DL
MCV RBC AUTO: 98.4 FL
MONOCYTES # BLD AUTO: 0.54 K/UL
MONOCYTES NFR BLD AUTO: 10.6 %
NEUTROPHILS # BLD AUTO: 3.05 K/UL
NEUTROPHILS NFR BLD AUTO: 59.8 %
PLATELET # BLD AUTO: 228 K/UL
POTASSIUM SERPL-SCNC: 4.6 MMOL/L
PROT SERPL-MCNC: 6.8 G/DL
RBC # BLD: 4.25 M/UL
RBC # FLD: 13.8 %
SODIUM SERPL-SCNC: 138 MMOL/L
WBC # FLD AUTO: 5.1 K/UL

## 2022-04-13 LAB
HEPB DNA PCR INT: NOT DETECTED
HEPB DNA PCR LOG: NOT DETECTED LOGIU/ML

## 2022-05-13 RX ORDER — BLOOD-GLUCOSE METER
W/DEVICE KIT MISCELLANEOUS
Qty: 1 | Refills: 0 | Status: ACTIVE | COMMUNITY
Start: 2022-04-21 | End: 1900-01-01

## 2022-05-16 ENCOUNTER — NON-APPOINTMENT (OUTPATIENT)
Age: 62
End: 2022-05-16

## 2022-05-26 NOTE — PATIENT PROFILE ADULT - FLU SEASON?
May 27, 2022    Pat Wagner PA-C  [id number]    Patient: Katiana Padilla   YOB: 1979   Date of Visit: 2022   Gestational age 26w1d   Monica Gallo of this communication: Routine though please note - what do you think about increasing her testing to twice weekly? Her friend (of similar age and nulliparity) had HELLP at the end and I think this is contributing to her anxiety, and I think her cHTN on meds pls AMA could support it  Please discuss at her next visit  Mannie Vazquez you are seeing her next in [facility] on 22  Dear Providers,    This patient was seen recently in our  office  The content of my evaluation today is in the ultrasound report under "OB Procedures" tab  Please don't hesitate to contact our office with any concerns or questions       Sincerely,      Himanshu Pastrana MD  Attending Physician, Trish
Yes...

## 2022-05-27 ENCOUNTER — RX CHANGE (OUTPATIENT)
Age: 62
End: 2022-05-27

## 2022-05-30 ENCOUNTER — NON-APPOINTMENT (OUTPATIENT)
Age: 62
End: 2022-05-30

## 2022-06-27 ENCOUNTER — APPOINTMENT (OUTPATIENT)
Dept: ENDOCRINOLOGY | Facility: CLINIC | Age: 62
End: 2022-06-27
Payer: MEDICAID

## 2022-06-27 VITALS
SYSTOLIC BLOOD PRESSURE: 134 MMHG | WEIGHT: 168.4 LBS | BODY MASS INDEX: 29.83 KG/M2 | OXYGEN SATURATION: 96 % | HEART RATE: 81 BPM | TEMPERATURE: 97.3 F | DIASTOLIC BLOOD PRESSURE: 90 MMHG

## 2022-06-27 LAB
GLUCOSE BLDC GLUCOMTR-MCNC: 129
HBA1C MFR BLD HPLC: 10.5

## 2022-06-27 PROCEDURE — 99214 OFFICE O/P EST MOD 30 MIN: CPT | Mod: 25

## 2022-06-27 PROCEDURE — 82962 GLUCOSE BLOOD TEST: CPT

## 2022-06-27 PROCEDURE — 83036 HEMOGLOBIN GLYCOSYLATED A1C: CPT | Mod: QW

## 2022-06-27 RX ORDER — LANCETS 33 GAUGE
EACH MISCELLANEOUS
Qty: 1 | Refills: 5 | Status: DISCONTINUED | COMMUNITY
Start: 2021-09-13 | End: 2022-06-27

## 2022-06-27 RX ORDER — BLOOD-GLUCOSE METER
EACH MISCELLANEOUS
Qty: 2 | Refills: 5 | Status: DISCONTINUED | COMMUNITY
Start: 2020-02-26 | End: 2022-06-27

## 2022-06-27 RX ORDER — BLOOD SUGAR DIAGNOSTIC
STRIP MISCELLANEOUS 3 TIMES DAILY
Qty: 1 | Refills: 5 | Status: DISCONTINUED | COMMUNITY
Start: 2020-02-26 | End: 2022-06-27

## 2022-06-28 ENCOUNTER — APPOINTMENT (OUTPATIENT)
Dept: OPHTHALMOLOGY | Facility: CLINIC | Age: 62
End: 2022-06-28
Payer: MEDICAID

## 2022-06-28 ENCOUNTER — NON-APPOINTMENT (OUTPATIENT)
Age: 62
End: 2022-06-28

## 2022-06-28 PROCEDURE — 92014 COMPRE OPH EXAM EST PT 1/>: CPT

## 2022-07-06 ENCOUNTER — APPOINTMENT (OUTPATIENT)
Dept: ULTRASOUND IMAGING | Facility: CLINIC | Age: 62
End: 2022-07-06

## 2022-07-06 ENCOUNTER — OUTPATIENT (OUTPATIENT)
Dept: OUTPATIENT SERVICES | Facility: HOSPITAL | Age: 62
LOS: 1 days | End: 2022-07-06
Payer: COMMERCIAL

## 2022-07-06 DIAGNOSIS — E04.1 NONTOXIC SINGLE THYROID NODULE: ICD-10-CM

## 2022-07-06 DIAGNOSIS — Z41.9 ENCOUNTER FOR PROCEDURE FOR PURPOSES OTHER THAN REMEDYING HEALTH STATE, UNSPECIFIED: Chronic | ICD-10-CM

## 2022-07-06 DIAGNOSIS — Z98.890 OTHER SPECIFIED POSTPROCEDURAL STATES: Chronic | ICD-10-CM

## 2022-07-06 PROCEDURE — 76536 US EXAM OF HEAD AND NECK: CPT | Mod: 26

## 2022-07-06 PROCEDURE — 76536 US EXAM OF HEAD AND NECK: CPT

## 2022-07-11 ENCOUNTER — RX RENEWAL (OUTPATIENT)
Age: 62
End: 2022-07-11

## 2022-07-12 NOTE — ASSESSMENT
[Diabetes Foot Care] : diabetes foot care [Long Term Vascular Complications] : long term vascular complications of diabetes [Carbohydrate Consistent Diet] : carbohydrate consistent diet [Importance of Diet and Exercise] : importance of diet and exercise to improve glycemic control, achieve weight loss and improve cardiovascular health [Exercise/Effect on Glucose] : exercise/effect on glucose [Hypoglycemia Management] : hypoglycemia management [Glucagon Use] : glucagon use [Ketone Testing] : ketone testing [Action and use of Insulin] : action and use of short and long-acting insulin [Self Monitoring of Blood Glucose] : self monitoring of blood glucose [Insulin Self-Administration] : insulin self-administration [Injection Technique, Storage, Sharps Disposal] : injection technique, storage, and sharps disposal [Sick-Day Management] : sick-day management [Retinopathy Screening] : Patient was referred to ophthalmology for retinopathy screening [FreeTextEntry1] : Patient is a 62 yo woman with uncontrolled type 2 diabetes, HTN, HLD, thyroid nodules here for endocrine care. \par \par 1. Uncontrolled Type 2 DM\par A1c 10.5%\par \par Increase in A1c likely due to lack of insurance/changing insurance and sporadic medication use. I had a discussion with the patient regarding her perception that Ozempic was not working previously but she only had taken 2 doses and it is possible she instead required dose titration. Instead will do a small increase in basal insulin and re-initiate Ozempic with more aggression titration. \par \par Patient was on Metformin previously and was hospitalized with lactic acidosis and therefore Metformin is not a good option for this patient. \par \par Plan:\par Basaglar 24 units at bedtime--> increase 26 units daily \par Restart 0.5 mg weekly for 2 weeks, then increase to 1mg weekly thereafter. Patient will call office for 1mg dose. \par Uses Humalog 8 units as needed for hypoglycemia.\par create formal scale at next visit. \par Encourage patient to monitor glucose more frequently than once a day.\par No personal/family history of medullary thyroid cancer/MEN.\par Due for dilated eye exam optho referral given. \par \par 2.Thyroid nodules\par repeat thyroid US\par \par 3.  Hypertension\par /90 mmHg\par Continue with losartan 25 mg once daily\par \par 4.  Hyperlipidemia \par LDL goal less than 70 mg/dL\par Continue to monitor lipid profile, follows with another provider. \par Atorvastatin 20mg daily\par \par 4.  Osteoporosis screening\par Done with DEXA 7/20/2021\par Repeat in 2 years 7/2023.\par \par RTC Kristen 3 months Dr. Walton 6 months.

## 2022-07-12 NOTE — PHYSICAL EXAM
[Alert] : alert [Well Nourished] : well nourished [No Acute Distress] : no acute distress [Well Developed] : well developed [Normal Sclera/Conjunctiva] : normal sclera/conjunctiva [EOMI] : extra ocular movement intact [No Proptosis] : no proptosis [Normal Oropharynx] : the oropharynx was normal [Thyroid Not Enlarged] : the thyroid was not enlarged [No Thyroid Nodules] : no palpable thyroid nodules [No Respiratory Distress] : no respiratory distress [No Accessory Muscle Use] : no accessory muscle use [Clear to Auscultation] : lungs were clear to auscultation bilaterally [Normal S1, S2] : normal S1 and S2 [Normal Rate] : heart rate was normal [Regular Rhythm] : with a regular rhythm [No Edema] : no peripheral edema [Pedal Pulses Normal] : the pedal pulses are present [Normal Bowel Sounds] : normal bowel sounds [Not Tender] : non-tender [Not Distended] : not distended [Soft] : abdomen soft [Normal Anterior Cervical Nodes] : no anterior cervical lymphadenopathy [No Spinal Tenderness] : no spinal tenderness [Spine Straight] : spine straight [No Stigmata of Cushings Syndrome] : no stigmata of Cushings Syndrome [Normal Gait] : normal gait [Normal Strength/Tone] : muscle strength and tone were normal [No Rash] : no rash [Normal] : normal [Full ROM] : with full range of motion [Normal Reflexes] : deep tendon reflexes were 2+ and symmetric [No Tremors] : no tremors [Oriented x3] : oriented to person, place, and time [Acanthosis Nigricans] : no acanthosis nigricans [Diminished Throughout Both Feet] : normal tactile sensation with monofilament testing throughout both feet

## 2022-07-12 NOTE — HISTORY OF PRESENT ILLNESS
[FreeTextEntry1] : 61-year-old woman with uncontrolled type 2 diabetes,thyroid nodule here for endocrinology follow-up.\par \par 02/07/2022 is 6.7%\par A1c today 6/27/22: 10.5%\par \par Diagnosed in 2017, managed by PCP.  Complicated by neuropathy.  Diagnosis with sarcoidosis and on steroid taper intermittently. Currently not on steroid, on methotrexate. \par \par Patient is currently on:\par Basaglar at night 24 units at bedtime. \par ozempic (?) not using--> missed several dosages and was taking her friends trulicity.\par Humalog only use very when her glucose is high. She usually only takes 8 units depending on her glucose level. \par \par Previous Medications:\par Patient was on Metformin previously and was hospitalized with lactic acidosis and therefore Metformin is not a good option for this patient. \par \par METER:\par Am readings are as follows from oldest to most recent:\par 134, 149, 163, 144, 133, 134, 139, 145, 250, 127, 215, 201, 152, [id number], 154, 168, 244, 230, 258, 201\par \par 24 hour food recall: \par Breakfast: Avocado and cup of tea\par Lunch: she skips lunch most days.  Only eats when she eats only when she feels hungry, ham and chips. \par Dinner: Sometimes rice and soup, she noticed if she eats anything with flour will increase her glucose.  \par Dilated eye exam: Last seen about a couple months ago. \par \par Thyroid nodule, subcentimeter nodule that does not meet criteria for FNA--Monitoring with thyroid US. \par \par 6/27/22:\par Patient has not had GLP-1/ trulicity in 2-3 weeks. Which has contributed to worsening glycemic control. \par It is unclear whether patient has been taking medications regularly. \par

## 2022-07-14 ENCOUNTER — NON-APPOINTMENT (OUTPATIENT)
Age: 62
End: 2022-07-14

## 2022-07-22 ENCOUNTER — RX RENEWAL (OUTPATIENT)
Age: 62
End: 2022-07-22

## 2022-08-21 ENCOUNTER — NON-APPOINTMENT (OUTPATIENT)
Age: 62
End: 2022-08-21

## 2022-09-30 ENCOUNTER — APPOINTMENT (OUTPATIENT)
Dept: ENDOCRINOLOGY | Facility: CLINIC | Age: 62
End: 2022-09-30

## 2022-09-30 VITALS
OXYGEN SATURATION: 99 % | TEMPERATURE: 97.6 F | WEIGHT: 163 LBS | HEIGHT: 63 IN | HEART RATE: 83 BPM | DIASTOLIC BLOOD PRESSURE: 80 MMHG | BODY MASS INDEX: 28.88 KG/M2 | SYSTOLIC BLOOD PRESSURE: 126 MMHG

## 2022-09-30 LAB
GLUCOSE BLDC GLUCOMTR-MCNC: 123
HBA1C MFR BLD HPLC: 6.7

## 2022-09-30 PROCEDURE — 99215 OFFICE O/P EST HI 40 MIN: CPT | Mod: 25

## 2022-09-30 PROCEDURE — 99205 OFFICE O/P NEW HI 60 MIN: CPT | Mod: 25

## 2022-09-30 PROCEDURE — 83036 HEMOGLOBIN GLYCOSYLATED A1C: CPT | Mod: QW

## 2022-09-30 PROCEDURE — 82962 GLUCOSE BLOOD TEST: CPT

## 2022-09-30 RX ORDER — FLASH GLUCOSE SENSOR
KIT MISCELLANEOUS
Qty: 6 | Refills: 3 | Status: DISCONTINUED | COMMUNITY
Start: 2022-06-01 | End: 2022-09-30

## 2022-09-30 RX ORDER — FLASH GLUCOSE SENSOR
KIT MISCELLANEOUS
Qty: 1 | Refills: 0 | Status: DISCONTINUED | COMMUNITY
Start: 2022-06-01 | End: 2022-09-30

## 2022-09-30 NOTE — HISTORY OF PRESENT ILLNESS
[FreeTextEntry1] : 61-year-old woman with uncontrolled type 2 diabetes,thyroid nodule here for endocrinology follow-up.\par \par 02/07/2022 is 6.7%\par A1c today 6/27/22: 10.5%\par A1c 9/30/22: 6.7%\par \par Diagnosed in 2017, managed by PCP.  Complicated by neuropathy.  Diagnosis with sarcoidosis and on steroid taper intermittently. Currently not on steroid, on methotrexate. \par Previously was not taking ozempic anymore and sporadically taking friends trulicity, now she has been consistently taking Ozempic. \par \par Patient is currently on:\par Basaglar 26 units\par Ozempic 1 mg weekly\par Humalog only use very when her glucose is high. Sometimes she skips her humalog if her glucose . Alot of the time she is holding mealtime insulin. \par \par Previous Medications:\par Patient was on Metformin previously and was hospitalized with lactic acidosis and therefore Metformin is not a good option for this patient. \par \par METER:\par Am readings are as follows from oldest to most recent:\par Verbal report:\par once daily in the morning only: 92, below 135 always. \par \par 24 hour food recall: \par Breakfast: Avocado and cup of tea\par Lunch: she skips lunch most days.  Only eats when she eats only when she feels hungry, ham and chips. \par Dinner: Sometimes rice and soup, she noticed if she eats anything with flour will increase her glucose.  \par Dilated eye exam: Last seen about a couple months ago. \par 9/30/22: has made many changes in her diet, eating most times 3 times daily. -- usually two meals, late breakfast and then dinner. \par \par Thyroid nodule, subcentimeter nodule that does not meet criteria for FNA--Monitoring with thyroid US. \par \par 6/27/22:\par Patient has not had GLP-1/ trulicity in 2-3 weeks. Which has contributed to worsening glycemic control. \par It is unclear whether patient has been taking medications regularly. \par \par 9/30/22:\par Doing excellent, A1c 6.7%.

## 2022-09-30 NOTE — ASSESSMENT
[Diabetes Foot Care] : diabetes foot care [Long Term Vascular Complications] : long term vascular complications of diabetes [Carbohydrate Consistent Diet] : carbohydrate consistent diet [Importance of Diet and Exercise] : importance of diet and exercise to improve glycemic control, achieve weight loss and improve cardiovascular health [Exercise/Effect on Glucose] : exercise/effect on glucose [Hypoglycemia Management] : hypoglycemia management [Glucagon Use] : glucagon use [Ketone Testing] : ketone testing [Action and use of Insulin] : action and use of short and long-acting insulin [Self Monitoring of Blood Glucose] : self monitoring of blood glucose [Insulin Self-Administration] : insulin self-administration [Injection Technique, Storage, Sharps Disposal] : injection technique, storage, and sharps disposal [Sick-Day Management] : sick-day management [Retinopathy Screening] : Patient was referred to ophthalmology for retinopathy screening [FreeTextEntry1] : Patient is a 60 yo woman with uncontrolled type 2 diabetes, HTN, HLD, thyroid nodules here for endocrine care. \par \par 1. Uncontrolled Type 2 DM\par A1c 10.5% 6/2022\par A1c 9/30/22: 6.7%\par \par Patient was on Metformin previously and was hospitalized with lactic acidosis and therefore Metformin is not a good option for this patient. \par \par Plan:\par Basaglar 26 units QHS -->decrease to 22 units at bedtime\par Ozempic 1 mg weekly-->increase to 2 mg weekly\par Humalog 8 units pre-meal --> decrease to 6 units-- trial holding insulin pre-meal\par \par Sent Chana, Verify lows education provided. \par Encourage patient to monitor glucose more frequently than once a day.\par No personal/family history of medullary thyroid cancer/MEN.\par Due for dilated eye exam optho referral given. \par \par -Patient requires a therapeutic CGM\par -Patient has diabetes mellitus\par -Patient has been using a home blood glucose monitor and performing frequent (four times a day) testing, 6 months\par -Patient is being insulin-treated with multiple daily injections (MDI) of insulin x4day, x 6months \par -Patient insulin treatment regimen requires frequent adjustments by the Patient on the basis of therapeutic CGM testing results.\par \par 2.Thyroid nodules\par Repeat thyroid US\par \par 3.  Hypertension\par /80 mmHg\par Continue with losartan 25 mg once daily\par \par 4.  Hyperlipidemia \par LDL goal less than 70 mg/dL\par Continue to monitor lipid profile, follows with another provider. \par Atorvastatin 20 mg daily\par \par 4.  Osteoporosis screening\par Done with DEXA 7/20/2021\par Repeat in 2 years 7/2023.\par \par RTC Dr Walton December.

## 2022-10-04 LAB
ALBUMIN SERPL ELPH-MCNC: 4.4 G/DL
ALP BLD-CCNC: 195 U/L
ALT SERPL-CCNC: 43 U/L
ANION GAP SERPL CALC-SCNC: 11 MMOL/L
AST SERPL-CCNC: 32 U/L
BILIRUB SERPL-MCNC: 0.5 MG/DL
BUN SERPL-MCNC: 9 MG/DL
CALCIUM SERPL-MCNC: 9.9 MG/DL
CHLORIDE SERPL-SCNC: 103 MMOL/L
CHOLEST SERPL-MCNC: 218 MG/DL
CO2 SERPL-SCNC: 27 MMOL/L
CREAT SERPL-MCNC: 0.63 MG/DL
CREAT SPEC-SCNC: 94 MG/DL
EGFR: 100 ML/MIN/1.73M2
GLUCOSE SERPL-MCNC: 101 MG/DL
HDLC SERPL-MCNC: 58 MG/DL
LDLC SERPL CALC-MCNC: 139 MG/DL
MICROALBUMIN 24H UR DL<=1MG/L-MCNC: <1.2 MG/DL
MICROALBUMIN/CREAT 24H UR-RTO: NORMAL MG/G
NONHDLC SERPL-MCNC: 159 MG/DL
POTASSIUM SERPL-SCNC: 4.5 MMOL/L
PROT SERPL-MCNC: 7 G/DL
SODIUM SERPL-SCNC: 141 MMOL/L
T4 FREE SERPL-MCNC: 1.2 NG/DL
TRIGL SERPL-MCNC: 101 MG/DL
TSH SERPL-ACNC: 1.34 UIU/ML

## 2022-10-05 ENCOUNTER — NON-APPOINTMENT (OUTPATIENT)
Age: 62
End: 2022-10-05

## 2022-10-10 ENCOUNTER — APPOINTMENT (OUTPATIENT)
Dept: RHEUMATOLOGY | Facility: CLINIC | Age: 62
End: 2022-10-10

## 2022-10-14 NOTE — PROGRESS NOTE ADULT - PROBLEM SELECTOR PLAN 5
[FreeTextEntry8] : patient c/o feeling dizzy and left ear clogged X 2 months \par \par I fell again on my face. \par \par I get confused doing my bills. \par \par I sometimes want to do stuff and I forget to do it. \par \par I cant read\par \par I need glasses so badly\par \par I am so nervous all the time - blood pressure stable  - resume home losartan No

## 2022-10-31 ENCOUNTER — RX RENEWAL (OUTPATIENT)
Age: 62
End: 2022-10-31

## 2022-11-01 RX ORDER — FLASH GLUCOSE SCANNING READER
EACH MISCELLANEOUS
Qty: 1 | Refills: 0 | Status: ACTIVE | COMMUNITY
Start: 2022-09-30 | End: 1900-01-01

## 2022-11-15 NOTE — ASSESSMENT
[FreeTextEntry1] : Ms. POWELL was evaluated for the following issues today:\par \par 1.) right parotid and facial swelling does not appear infectious or sinus-related.  May be related to sarcoid\par -->Rheumatology referral \par --> US neck/parotid to look at the gland and lymph nodes\par \par 2.) neck lymphadenopathy likely sarcoid as well\par \par 3.) chronic sinusitis, still w pressure after antibiotic treatment.  Right dependent nasal congestion is due to inferior turbinate edema.  Turbinate can be reduced in size in future to improve congestion.\par --> continue fluticasone nasal spray\par --> continue saline spray\par \par 4.) chest pain\par --> try ibuprofen\par \par Return in 2 months\par 
gait, locomotion, and balance/poor safety awareness

## 2022-12-13 ENCOUNTER — RX RENEWAL (OUTPATIENT)
Age: 62
End: 2022-12-13

## 2022-12-13 ENCOUNTER — APPOINTMENT (OUTPATIENT)
Dept: ENDOCRINOLOGY | Facility: CLINIC | Age: 62
End: 2022-12-13

## 2022-12-13 VITALS
HEIGHT: 63 IN | OXYGEN SATURATION: 98 % | DIASTOLIC BLOOD PRESSURE: 74 MMHG | HEART RATE: 83 BPM | WEIGHT: 148 LBS | SYSTOLIC BLOOD PRESSURE: 118 MMHG | BODY MASS INDEX: 26.22 KG/M2

## 2022-12-13 LAB — HBA1C MFR BLD HPLC: 6.2

## 2022-12-13 PROCEDURE — 99214 OFFICE O/P EST MOD 30 MIN: CPT | Mod: 25

## 2022-12-13 PROCEDURE — 83036 HEMOGLOBIN GLYCOSYLATED A1C: CPT | Mod: QW

## 2022-12-13 NOTE — ASSESSMENT
[Diabetes Foot Care] : diabetes foot care [Long Term Vascular Complications] : long term vascular complications of diabetes [Carbohydrate Consistent Diet] : carbohydrate consistent diet [Importance of Diet and Exercise] : importance of diet and exercise to improve glycemic control, achieve weight loss and improve cardiovascular health [Exercise/Effect on Glucose] : exercise/effect on glucose [Hypoglycemia Management] : hypoglycemia management [Glucagon Use] : glucagon use [Ketone Testing] : ketone testing [Action and use of Insulin] : action and use of short and long-acting insulin [Self Monitoring of Blood Glucose] : self monitoring of blood glucose [Insulin Self-Administration] : insulin self-administration [Injection Technique, Storage, Sharps Disposal] : injection technique, storage, and sharps disposal [Sick-Day Management] : sick-day management [Retinopathy Screening] : Patient was referred to ophthalmology for retinopathy screening [FreeTextEntry1] : Patient is a 60 yo woman with uncontrolled type 2 diabetes, HTN, HLD, thyroid nodules here for endocrine care. \par \par 1. Uncontrolled Type 2 DM\par A1c 10.5% 6/2022\par A1c 9/30/22: 6.7%\par A1c 12/13/2022: 6.2%\par \par Patient was on Metformin previously and was hospitalized with lactic acidosis and therefore Metformin is not a good option for this patient. \par \par Plan:\par Patient reduced Basaglar to 18 units recently due to overnight hypoglycemia, also reports taking Basaglar once in the last four days. Noted with hypoglycemia overnight. Will stop Basaglar for now. AM glucose readings appropriate last few days while off Basaglar. \par Continue Ozempic 2 mg weekly\par Has not been taking Humalog prior to meals recently, post prandial glucose readings stable, will continue to hold Humalog at this time. \par Continue Chana \par \par No personal/family history of medullary thyroid cancer/MEN.\par \par -Patient requires a therapeutic CGM\par -Patient has diabetes mellitus\par -Patient has been using a home blood glucose monitor and performing frequent (four times a day) testing, 6 months\par -Patient is being insulin-treated with multiple daily injections (MDI) of insulin x4day, x 6months \par -Patient insulin treatment regimen requires frequent adjustments by the Patient on the basis of therapeutic CGM testing results.\par \par 2.Thyroid nodules\par - 7/2022 thyroid US: subcentimeter right thyroid lobe nodules unchanged from previous imaging \par - Repeat thyroid ultrasound in 2024\par \par 3.  Hypertension\par - /74 mmHg\par - Continue with losartan 25 mg once daily\par \par 4.  Hyperlipidemia \par - LDL goal less than 70 mg/dL\par - Continue to monitor lipid profile, follows with another provider. \par - Atorvastatin 20 mg daily\par \par 4.  Osteoporosis screening\par - Done with DEXA 7/20/2021\par - Repeat in 2 years 7/2023.\par \par 5. Elevated Alk phos\par - History of Hepatitis B\par - Patient to follow up with PCP in 2 months, repeat CMP at that time. \par \par RTC Dr Walton December.

## 2022-12-13 NOTE — END OF VISIT
[Time Spent: ___ minutes] : I have spent [unfilled] minutes of time on the encounter. [FreeTextEntry3] : Patient is a 61-year-old woman with history of type 2 diabetes, current A1c controlled at 6.2%, hypertension, hyperlipidemia, thyroid nodule, here for endocrinology follow-up.\par Patient with recurrent hypoglycemia while on basal insulin over the last few weeks.  She had discontinue basal insulin.  She is currently taking Ozempic 2 mg once weekly.  For now given recurrent hypoglycemia, okay to discontinue basal insulin.  Continue to monitor glucose and consider restarting Lantus 10 units if needed.  Patient is utilizing freestyle aditya sensor.\par \par Patient with a history of subcentimeter thyroid nodule, last ultrasound done in July 2022.  Repeat in 2024.\par \par Regarding blood pressure, BP today 118/74.  Patient is on an ARB.  ACR was within normal limits check in September 2022.\par \par \par Case discussed with ESVIN Huynh

## 2022-12-13 NOTE — HISTORY OF PRESENT ILLNESS
[FreeTextEntry1] : 61 year old woman with uncontrolled type 2 diabetes, thyroid nodule here for endocrinology follow-up.\par \par A1c 02/07/2022 is 6.7%\par A1c 6/27/22: 10.5%\par A1c 9/30/22: 6.7%\par A!C 12/13/2022: 6.2%\par \par Diagnosed in 2017, managed by PCP.  Complicated by neuropathy.  Diagnosis with sarcoidosis and was on steroid taper intermittently. Currently not on steroid, on methotrexate once a week. Per patient has not been on steroids for a long time.  \par Previously was not taking ozempic and sporadically taking friends trulicity, now she has been consistently taking Ozempic. Patient reports she is doing well. Feeling more energetic. \par \par Patient is currently on:\par Basaglar 18 units at bedtime (decreased it from 22 units to 18 units one week ago 2/2 hypoglycemia events middle of night)\par Ozempic 2 mg weekly (increased at last visit)\par Currently only takes Humalog if FS >190 before meals, most times she is holding mealtime insulin. \par Will also hold basaglar if FS < 120 as she wants to avoid overnight hypoglycemia, states has taken Basaglar only once in the last four days \par \par Previous Medications:\par Patient was on Metformin previously and was hospitalized with lactic acidosis and therefore Metformin is not a good option for this patient. \par \par METER: Chana\par Time in ranges:\par Very high: 0%\par High: 2%\par Target range: 94%\par Low: 4%\par Very low" 0%\par \par Patient noted hypoglycemia 12AM-2AM on 12/8/2022 \par AM fingersticks (from most recent first): 129, 114, 93, 123, 105, 98, 120, 75, 81\par \par Diet:\par Breakfast: Avocado and cup of tea or tea\par Lunch: Diabetic rice with vegetable\par Dinner: Diabetic rice, chicken/fish\par Limits bread intake\par \par Dilated eye exam: Saw in 2022, was told she does not have diabetic retinopathy. \par 9/30/22: has made many changes in her diet, eating most times 3 times daily. -- usually two meals, late breakfast and then dinner. \par Podiatry: does not see podiatry\par \par Exercise: walks 4 miles two times a week,\par \par Thyroid nodule, subcentimeter nodule that does not meet criteria for FNA--Monitoring with thyroid US. 7/2022 thyroid US: subcentimeter right thyroid lobe nodules unchanged from previous imaging \par \par No constipation or diarrhea. No weight gain or weight loss. No heat or cold intolerance. No fatigue.\par No palpitations. No tremors. No anxiety or depression. No mental slowing.\par No skin or hair changes. No facial or peripheral edema.\par Compressive symptoms: No neck swelling, no dysphagia, no dyspnea, no change in voice.\par \par

## 2022-12-13 NOTE — PHYSICAL EXAM
[Alert] : alert [Well Nourished] : well nourished [No Acute Distress] : no acute distress [Normal Sclera/Conjunctiva] : normal sclera/conjunctiva [Thyroid Not Enlarged] : the thyroid was not enlarged [No Thyroid Nodules] : no palpable thyroid nodules [No Respiratory Distress] : no respiratory distress [No Accessory Muscle Use] : no accessory muscle use [Clear to Auscultation] : lungs were clear to auscultation bilaterally [Normal S1, S2] : normal S1 and S2 [Normal Rate] : heart rate was normal [Regular Rhythm] : with a regular rhythm [No Edema] : no peripheral edema [Normal Bowel Sounds] : normal bowel sounds [Not Tender] : non-tender [Not Distended] : not distended [Soft] : abdomen soft [Normal Anterior Cervical Nodes] : no anterior cervical lymphadenopathy [No Stigmata of Cushings Syndrome] : no stigmata of Cushings Syndrome [No Tremors] : no tremors [Oriented x3] : oriented to person, place, and time [Well Developed] : well developed [No Proptosis] : no proptosis [No Neck Mass] : no neck mass was observed [Normal Gait] : normal gait [Normal Affect] : the affect was normal [Acanthosis Nigricans] : no acanthosis nigricans

## 2022-12-13 NOTE — REVIEW OF SYSTEMS
[Negative] : Musculoskeletal [Fatigue] : no fatigue [Dry Eyes] : no dryness [Eye Pain] : no pain [Blurred Vision] : no blurred vision [Dysphagia] : no dysphagia [Neck Pain] : no neck pain [Chest Pain] : no chest pain [Palpitations] : no palpitations [Shortness Of Breath] : no shortness of breath [Nausea] : no nausea [Constipation] : no constipation [Abdominal Pain] : no abdominal pain [Vomiting] : no vomiting [Diarrhea] : no diarrhea [Dry Skin] : no dry skin [Hair Loss] : no hair loss [Headaches] : no headaches [Dizziness] : no dizziness [Depression] : no depression [Polydipsia] : no polydipsia [Cold Intolerance] : no cold intolerance [Heat Intolerance] : no heat intolerance

## 2022-12-21 ENCOUNTER — RX RENEWAL (OUTPATIENT)
Age: 62
End: 2022-12-21

## 2023-02-09 NOTE — ASSESSMENT
Section Operative Note  [facility]    Pre-operative diagnosis: Intrauterine pregnancy at 39 1/7 weeks gestation  Repeat  section   Post-operative diagnosis: Same   Procedure: Repeat low transverse  section   Surgeon: Ankush Godinez MD   Assistant(s): Daisy Schilling NP (assistance required for retraction, exposure, instrument handling, and wound closure)     Anesthesia: Spinal anesthesia and Tap block   Estimated blood loss: 500ml   Total IV fluids: (See anesthesia record)   Blood transfusion: No transfusion was given during surgery   Total urine output: (See anesthesia record)   Drains: Jama catheter   Specimens: placenta    Findings: Vigorous  female.  Apgar's , intact placenta with 3VC, nml pelvic anatomy   Complications: None   Condition: Mother stable, transfered to post-anesthesia recovery     Procedure Details:  The risks, benefits, complications, treatment options, and expected outcomes were discussed with the patient.  The patient concurred with the proposed plan, giving informed consent.  The patient was taken to Operating Room,  identity confirmed and the procedure verified as  Delivery. A Time Out was held and the above information confirmed.    After uneventful anesthesia placement the patient was prepped and draped in the left lateral tilt  position and a jama catheter placed.   A low-transverse skin incision was made with a scalpel and carried down through the subcutaneous tissue to the fascia which was extended transversely. The fascia was  from the underlying rectus tissue superiorly and inferiorly. The peritoneum was identified and entered without difficulty. The utero-vesical peritoneal reflection was incised transversely and the bladder flap was bluntly freed from the lower uterine segment. A low transverse uterine incision was made.  The infant was delivered from the vtx  presentation.  After the umbilical cord was clamped and  [FreeTextEntry1] : Ms. Branch  was evaluated for the following issues today:\par \par 1.) Chronic sinusitis with recent exacerbation on right.  \par --> continue saline mist spray\par \par 2.) Possible left odontogenic cyst or infection.\par --> CT maxillofacial noncontrast\par \par 3.) Multiple thyroid nodules, all sub-cm size and without suspicious features\par --> Repeat US thyroid in 1 year\par \par Return in 1 month cut the infant was suctioned and handed to the awaiting resuscitation team.  Cord blood was obtained for evaluation. The placenta was removed intact and the uterus swept free of membranes and debris. The uterine incision was closed with running locked sutures of 1.0 Chromic in a 2 layer fashion.  Hemostasis was excellent. Irrigation with warm normal saline was carried out until clear.The rectus muscles were re approximated with running 0 Vicryl.   The fascia was then reapproximated with running sutures of 0 PDS. The subcutaneous space was irrigated and checked for hemostasis.  The skin was reapproximated with running 3.0 Monocryl and surgical glue.   A vaginal exam was performed at the end of the procedure to evacuate the lower uterine segment and vagina of clots.  There were no complications and the patient was transferred to the recovery room in excellent and stable condition.    Instrument, sponge, and needle counts were correct prior the abdominal closure and at the conclusion of the case.         Ankush Godinez MD

## 2023-03-08 ENCOUNTER — APPOINTMENT (OUTPATIENT)
Dept: ENDOCRINOLOGY | Facility: CLINIC | Age: 63
End: 2023-03-08
Payer: MEDICAID

## 2023-03-08 VITALS
WEIGHT: 140 LBS | HEIGHT: 63 IN | DIASTOLIC BLOOD PRESSURE: 80 MMHG | OXYGEN SATURATION: 98 % | BODY MASS INDEX: 24.8 KG/M2 | HEART RATE: 84 BPM | SYSTOLIC BLOOD PRESSURE: 122 MMHG | RESPIRATION RATE: 14 BRPM

## 2023-03-08 LAB — HBA1C MFR BLD HPLC: 6

## 2023-03-08 PROCEDURE — 83036 HEMOGLOBIN GLYCOSYLATED A1C: CPT | Mod: QW

## 2023-03-08 PROCEDURE — 99214 OFFICE O/P EST MOD 30 MIN: CPT | Mod: 25

## 2023-03-08 PROCEDURE — 95251 CONT GLUC MNTR ANALYSIS I&R: CPT

## 2023-03-08 NOTE — HISTORY OF PRESENT ILLNESS
[FreeTextEntry1] : 62 year old woman with uncontrolled type 2 diabetes, thyroid nodule here for endocrinology follow-up.\par \par A1c 02/07/2022 is 6.7%\par A1c 6/27/22: 10.5%\par A1c 9/30/22: 6.7%\par A1C 12/13/2022: 6.2%\par A1c 3/8/23: 6%\par \par Diagnosed in 2017, managed by PCP.  Complicated by neuropathy.  Diagnosis with sarcoidosis and was on steroid taper intermittently. Currently not on steroid, on methotrexate once a week. Per patient has not been on steroids for a long time.  \par Previously was not taking ozempic and sporadically taking friends trulicity, now she has been consistently taking Ozempic. Patient reports she is doing well. Feeling more energetic. \par \par Patient is currently on:\par Ozempic 2 mg weekly \par \par Previous Medications:\par Patient was on Metformin previously and was hospitalized with lactic acidosis and therefore Metformin is not a good option for this patient. \par Has not been taking Humalog prior to meals recently, post prandial glucose readings stable, will continue to hold Humalog at this time. \par Basaglar 18 stopped at last visit for overnight hypoglycemia. \par \par METER: Chana\par Time in ranges:\par Very high: 0%\par High: 7%\par Target range: 93 %\par Low:0 %\par Very low" 0%\par \par AM fingersticks (from most recent first): \par \par Diet:\par Breakfast: Avocado and cup of tea or tea\par Lunch: Diabetic rice with vegetable\par Dinner: Diabetic rice, chicken/fish\par Limits bread intake\par \par Dilated eye exam: Saw in 2022, was told she does not have diabetic retinopathy. \par Also went again to optho: Gloria Dunn MD\par \par 9/30/22: has made many changes in her diet, eating most times 3 times daily. -- usually two meals, late breakfast and then dinner. \par Podiatry: does not see podiatry\par \par Exercise: walks 4 miles two times a week,\par \par Thyroid nodule, subcentimeter nodule that does not meet criteria for FNA--Monitoring with thyroid US. 7/2022 thyroid US: subcentimeter right thyroid lobe nodules unchanged from previous imaging \par \par No constipation or diarrhea. No weight gain or weight loss. No heat or cold intolerance. No fatigue.\par No palpitations. No tremors. No anxiety or depression. No mental slowing.\par No skin or hair changes. No facial or peripheral edema.\par Compressive symptoms: No neck swelling, no dysphagia, no dyspnea, no change in voice.\par \par Was on vacation and was eating badly.

## 2023-03-08 NOTE — PHYSICAL EXAM
[Alert] : alert [Well Nourished] : well nourished [No Acute Distress] : no acute distress [Well Developed] : well developed [No Neck Mass] : no neck mass was observed [Thyroid Not Enlarged] : the thyroid was not enlarged [No Thyroid Nodules] : no palpable thyroid nodules [No Respiratory Distress] : no respiratory distress [No Accessory Muscle Use] : no accessory muscle use [Clear to Auscultation] : lungs were clear to auscultation bilaterally [Normal S1, S2] : normal S1 and S2 [Normal Rate] : heart rate was normal [Regular Rhythm] : with a regular rhythm [No Edema] : no peripheral edema [Normal Bowel Sounds] : normal bowel sounds [Not Tender] : non-tender [Not Distended] : not distended [Soft] : abdomen soft [Normal Anterior Cervical Nodes] : no anterior cervical lymphadenopathy [Normal Gait] : normal gait [No Tremors] : no tremors [Oriented x3] : oriented to person, place, and time [Normal Affect] : the affect was normal [Acanthosis Nigricans] : no acanthosis nigricans

## 2023-03-08 NOTE — ASSESSMENT
[Diabetes Foot Care] : diabetes foot care [Long Term Vascular Complications] : long term vascular complications of diabetes [Carbohydrate Consistent Diet] : carbohydrate consistent diet [Importance of Diet and Exercise] : importance of diet and exercise to improve glycemic control, achieve weight loss and improve cardiovascular health [Exercise/Effect on Glucose] : exercise/effect on glucose [Hypoglycemia Management] : hypoglycemia management [Glucagon Use] : glucagon use [Ketone Testing] : ketone testing [Action and use of Insulin] : action and use of short and long-acting insulin [Self Monitoring of Blood Glucose] : self monitoring of blood glucose [Insulin Self-Administration] : insulin self-administration [Injection Technique, Storage, Sharps Disposal] : injection technique, storage, and sharps disposal [Sick-Day Management] : sick-day management [Retinopathy Screening] : Patient was referred to ophthalmology for retinopathy screening [FreeTextEntry1] : Patient is a 61 yo woman with uncontrolled type 2 diabetes, HTN, HLD, thyroid nodules here for endocrine care. \par \par 1. Uncontrolled Type 2 DM\par A1c 10.5% 6/2022\par A1c 9/30/22: 6.7%\par A1c 12/13/2022: 6.2%\par A1c 3/8/23: 6%\par \par Patient was on Metformin previously and was hospitalized with lactic acidosis and therefore Metformin is not a good option for this patient. \par Chana interpretation as follows:\par High: 7%\par Target range: 93 %\par Overall the patient is doing quite well on Ozempic only. \par She does occasionally spike after a high carbohydrate meal. \par \par Plan:\par Continue Ozempic 2 mg weekly\par Continue Chana \par \par No personal/family history of medullary thyroid cancer/MEN.\par \par -Patient requires a therapeutic CGM\par -Patient has diabetes mellitus\par -Patient has been using a home blood glucose monitor and performing frequent (four times a day) testing, 6 months\par -Patient is being insulin-treated with multiple daily injections (MDI) of insulin x4day, x 6months \par -Patient insulin treatment regimen requires frequent adjustments by the Patient on the basis of therapeutic CGM testing results.\par \par 2.Thyroid nodules\par - 7/2022 thyroid US: subcentimeter right thyroid lobe nodules unchanged from previous imaging \par - Repeat thyroid ultrasound in 2024\par \par 3.  Hypertension\par - /80  mmHg\par - Previously was on losartan 25 mg once daily-- not taking. \par \par 4.  Hyperlipidemia \par - LDL goal less than 70 mg/dL\par - Continue to monitor lipid profile, follows with another provider. \par - Atorvastatin 20 mg daily\par \par 4.  Osteoporosis screening\par - Done with DEXA 7/20/2021\par - Repeat in 2 years 7/2023.\par \par 5. Elevated Alk phos\par - History of Hepatitis B\par - Patient to follow up with PCP for repeat CMP\par \par RTC Dr Walton July. \par RTC Kristen October.

## 2023-04-03 ENCOUNTER — RX RENEWAL (OUTPATIENT)
Age: 63
End: 2023-04-03

## 2023-05-10 ENCOUNTER — APPOINTMENT (OUTPATIENT)
Dept: INTERNAL MEDICINE | Facility: CLINIC | Age: 63
End: 2023-05-10
Payer: MEDICAID

## 2023-05-10 VITALS
DIASTOLIC BLOOD PRESSURE: 78 MMHG | WEIGHT: 134 LBS | SYSTOLIC BLOOD PRESSURE: 134 MMHG | HEIGHT: 63 IN | BODY MASS INDEX: 23.74 KG/M2

## 2023-05-10 DIAGNOSIS — Z23 ENCOUNTER FOR IMMUNIZATION: ICD-10-CM

## 2023-05-10 DIAGNOSIS — Z00.00 ENCOUNTER FOR GENERAL ADULT MEDICAL EXAMINATION W/OUT ABNORMAL FINDINGS: ICD-10-CM

## 2023-05-10 PROCEDURE — 90715 TDAP VACCINE 7 YRS/> IM: CPT

## 2023-05-10 PROCEDURE — 90471 IMMUNIZATION ADMIN: CPT

## 2023-05-10 PROCEDURE — 99396 PREV VISIT EST AGE 40-64: CPT | Mod: 25

## 2023-05-10 PROCEDURE — 36415 COLL VENOUS BLD VENIPUNCTURE: CPT

## 2023-05-10 RX ORDER — FLUTICASONE PROPIONATE 50 UG/1
50 SPRAY, METERED NASAL DAILY
Qty: 16 | Refills: 1 | Status: DISCONTINUED | COMMUNITY
Start: 2021-04-07 | End: 2023-05-10

## 2023-05-10 RX ORDER — MELOXICAM 15 MG/1
15 TABLET ORAL
Qty: 30 | Refills: 0 | Status: DISCONTINUED | COMMUNITY
Start: 2022-08-22 | End: 2023-05-10

## 2023-05-10 RX ORDER — METHOTREXATE 2.5 MG/1
2.5 TABLET ORAL
Qty: 12 | Refills: 3 | Status: DISCONTINUED | COMMUNITY
Start: 2021-09-13 | End: 2023-05-10

## 2023-05-10 RX ORDER — MELOXICAM 15 MG/1
15 TABLET ORAL DAILY
Qty: 1 | Refills: 2 | Status: DISCONTINUED | COMMUNITY
Start: 2021-11-11 | End: 2023-05-10

## 2023-05-10 RX ORDER — METHOTREXATE 2.5 MG/1
2.5 TABLET ORAL
Qty: 12 | Refills: 5 | Status: DISCONTINUED | COMMUNITY
Start: 2021-07-29 | End: 2023-05-10

## 2023-05-10 RX ORDER — SODIUM CHLORIDE 0.65 %
0.65 AEROSOL, SPRAY (ML) NASAL
Qty: 1 | Refills: 5 | Status: DISCONTINUED | COMMUNITY
Start: 2021-02-24 | End: 2023-05-10

## 2023-05-10 RX ORDER — FOLIC ACID 1 MG/1
1 TABLET ORAL DAILY
Qty: 30 | Refills: 5 | Status: DISCONTINUED | COMMUNITY
Start: 2021-07-29 | End: 2023-05-10

## 2023-05-10 RX ORDER — FAMOTIDINE 40 MG/1
40 TABLET, FILM COATED ORAL DAILY
Qty: 30 | Refills: 0 | Status: DISCONTINUED | COMMUNITY
Start: 2021-07-12 | End: 2023-05-10

## 2023-05-10 NOTE — PHYSICAL EXAM
[No Acute Distress] : no acute distress [Well Nourished] : well nourished [Well Developed] : well developed [Well-Appearing] : well-appearing [Normal Sclera/Conjunctiva] : normal sclera/conjunctiva [PERRL] : pupils equal round and reactive to light [EOMI] : extraocular movements intact [Normal Outer Ear/Nose] : the outer ears and nose were normal in appearance [Normal Oropharynx] : the oropharynx was normal [No JVD] : no jugular venous distention [No Lymphadenopathy] : no lymphadenopathy [Supple] : supple [Thyroid Normal, No Nodules] : the thyroid was normal and there were no nodules present [No Respiratory Distress] : no respiratory distress  [No Accessory Muscle Use] : no accessory muscle use [Clear to Auscultation] : lungs were clear to auscultation bilaterally [Normal Rate] : normal rate  [Regular Rhythm] : with a regular rhythm [Normal S1, S2] : normal S1 and S2 [No Murmur] : no murmur heard [No Carotid Bruits] : no carotid bruits [No Abdominal Bruit] : a ~M bruit was not heard ~T in the abdomen [No Varicosities] : no varicosities [Pedal Pulses Present] : the pedal pulses are present [No Edema] : there was no peripheral edema [No Palpable Aorta] : no palpable aorta [Soft] : abdomen soft [No Extremity Clubbing/Cyanosis] : no extremity clubbing/cyanosis [Non Tender] : non-tender [Non-distended] : non-distended [No Masses] : no abdominal mass palpated [No HSM] : no HSM [Normal Bowel Sounds] : normal bowel sounds [Normal Posterior Cervical Nodes] : no posterior cervical lymphadenopathy [Normal Anterior Cervical Nodes] : no anterior cervical lymphadenopathy [No CVA Tenderness] : no CVA  tenderness [No Spinal Tenderness] : no spinal tenderness [No Joint Swelling] : no joint swelling [No Rash] : no rash [Grossly Normal Strength/Tone] : grossly normal strength/tone [Coordination Grossly Intact] : coordination grossly intact [No Focal Deficits] : no focal deficits [Normal Gait] : normal gait [Deep Tendon Reflexes (DTR)] : deep tendon reflexes were 2+ and symmetric [Normal Affect] : the affect was normal [Normal Insight/Judgement] : insight and judgment were intact

## 2023-05-10 NOTE — HISTORY OF PRESENT ILLNESS
[de-identified] : 59 yo f with T2DM, Sarcoidosis, HTN, HLD, thyroid nodules. non obstructive CAD\par \par no complaints today \par taking only ozempic \par \par diet - forces herself to eat\par exercise -walking, no SOB \par \par mammo - needs updating \par gyn -needs to find one out in [place] \par colonoscopy -3/19 \par DEXA - 7/21 \par \par shringrix UTD \par TDap -will do today

## 2023-05-10 NOTE — PLAN
[FreeTextEntry1] : wellness complete -will f/u on labs today\par -healthy habits encouraged\par -cont ozempic -no side effects, low appetite -losing weight and sugars controlled \par -mammo and gyn referral \par -TDap today \par \par CAD -should be on statin -was on atorvastatin in the past and tolerated well \par -BP above goal -restart losartan -tolerated in the past \par -discussed risk factors \par \par I, Dr. Deepak Olivera personally performed the evaluation and management services for this patient who presents today with a new problem. The evaluation and management includes conducting the examination assessing all conditions and establishing a new plan of care. Today my NP Shaggy Garnica was here and recorded the evaluation and management services.\par

## 2023-05-11 LAB
ALBUMIN SERPL ELPH-MCNC: 4.2 G/DL
ALP BLD-CCNC: 212 U/L
ALT SERPL-CCNC: 43 U/L
ANION GAP SERPL CALC-SCNC: 10 MMOL/L
AST SERPL-CCNC: 33 U/L
BASOPHILS # BLD AUTO: 0.03 K/UL
BASOPHILS NFR BLD AUTO: 0.7 %
BILIRUB SERPL-MCNC: 0.6 MG/DL
BUN SERPL-MCNC: 15 MG/DL
CALCIUM SERPL-MCNC: 9.9 MG/DL
CHLORIDE SERPL-SCNC: 106 MMOL/L
CHOLEST SERPL-MCNC: 185 MG/DL
CO2 SERPL-SCNC: 25 MMOL/L
CREAT SERPL-MCNC: 0.65 MG/DL
EGFR: 98 ML/MIN/1.73M2
EOSINOPHIL # BLD AUTO: 0.21 K/UL
EOSINOPHIL NFR BLD AUTO: 4.7 %
ESTIMATED AVERAGE GLUCOSE: 131 MG/DL
GLUCOSE SERPL-MCNC: 135 MG/DL
HBA1C MFR BLD HPLC: 6.2 %
HCT VFR BLD CALC: 44.8 %
HDLC SERPL-MCNC: 54 MG/DL
HGB BLD-MCNC: 14 G/DL
IMM GRANULOCYTES NFR BLD AUTO: 0.2 %
LDLC SERPL CALC-MCNC: 108 MG/DL
LYMPHOCYTES # BLD AUTO: 1.18 K/UL
LYMPHOCYTES NFR BLD AUTO: 26.5 %
MAN DIFF?: NORMAL
MCHC RBC-ENTMCNC: 31.3 GM/DL
MCHC RBC-ENTMCNC: 31.5 PG
MCV RBC AUTO: 100.7 FL
MONOCYTES # BLD AUTO: 0.66 K/UL
MONOCYTES NFR BLD AUTO: 14.8 %
NEUTROPHILS # BLD AUTO: 2.37 K/UL
NEUTROPHILS NFR BLD AUTO: 53.1 %
NONHDLC SERPL-MCNC: 131 MG/DL
PLATELET # BLD AUTO: 195 K/UL
POTASSIUM SERPL-SCNC: 5.2 MMOL/L
PROT SERPL-MCNC: 6.9 G/DL
RBC # BLD: 4.45 M/UL
RBC # FLD: 13.4 %
SODIUM SERPL-SCNC: 142 MMOL/L
TRIGL SERPL-MCNC: 115 MG/DL
TSH SERPL-ACNC: 1.2 UIU/ML
WBC # FLD AUTO: 4.46 K/UL

## 2023-05-30 NOTE — HISTORY OF PRESENT ILLNESS
May 30, 2023       Tere Byers MD  [address] [facility] [address]  Via In Basket      Patient: Diana Davis   YOB: 1933   Date of Visit: 2023       Dear Dr. Byers:    Thank you for referring Diana Davis to me for evaluation. Below are my notes for this visit with her.    If you have questions, please do not hesitate to call me. I look forward to following your patient along with you.      Sincerely,        Mandy Jasso CNP        CC: No Recipients  Mandy Jasso CNP  2023  1:48 PM  Signed  OFFICE VISIT      Patient: Diana Davis Date of Service: 2023    : 10/11/1933 MRN: [id number]     SUBJECTIVE:   HISTORY OF PRESENT ILLNESS:  Diana Davis is a 89 year old female who presents today for a four month general cardiology follow up.  She is a patient of Dr Tellez.     She has a history of HTN, HLD, CAD with previous MI, mild AI, and mitral stenosis.    Feeling good, appears comfortable in clinic  Started exercising this year, 3-5 days a week  Mostly chair exercise, some standing exercises and stretching as well   Watching public TV videos for instruction  Making her feel stronger   No chest pain or SOB   No leg swelling or dizziness   Has bad right knee, maybe can walk a block   Son and daughter in law get groceries for her   Lives with her daughter who is legally blind   Daughter does cleaning, she does cooking and lighter cleaning   Has been more fatigued lately, needing a nap mid-day  Had another fall about a month ago, slipped in bathtub, was able to grab side rail and let herself down easy, did not injure herself, now using shower chair    Has been checking BP at home frequently since last visit     Readings from this past month:   148/75  132/73  134/61  127/60  134/70  128/72  130/73  137/71  113/61  115/62  127/58  130/60  143/71  132/68  129/60  115/55  122/61  129/59  133/65  133/61  138/65  131/65  130/63  120/62       161/80 her machine  162/60 my  read     PAST MEDICAL HISTORY:  Past Medical History:   Diagnosis Date   • Essential (primary) hypertension    • Heart attack (CMD) 1991    s/p catherization; no surgeries; on medication and made dietary changes   • High cholesterol    • Thyroid condition        MEDICATIONS:  Current Outpatient Medications   Medication Sig   • [START ON 6/1/2023] traMADol (ULTRAM) 50 MG tablet Take 1 tablet by mouth every 8 hours as needed for Pain. Do not start before June 1, 2023.   • levothyroxine 25 MCG tablet TAKE ONE TABLET BY MOUTH ONE TIME DAILY   • atorvastatin (LIPITOR) 20 MG tablet Take 1 tablet by mouth daily.   • nitroGLYCERIN (NITROSTAT) 0.4 MG sublingual tablet Place 1 tablet under the tongue every 5 minutes as needed for Chest pain (max 3 in 15 minutes).   • metoPROLOL succinate (TOPROL-XL) 100 MG 24 hr tablet Take 1 tablet by mouth daily.   • amLODIPine (NORVASC) 10 MG tablet Take 1 tablet by mouth daily.   • naLOXone (NARCAN) 4 MG/0.1ML nasal spray Spray the content of 1 device into 1 nostril. Call 911. May repeat with 2nd device in alternate nostril if no response in 2-3 minutes.   • aspirin 81 MG chewable tablet Chew 81 mg by mouth daily.   • Multiple Vitamins-Minerals (PRESERVISION AREDS 2) Cap Take by mouth every 12 hours.     No current facility-administered medications for this visit.       ALLERGIES:  ALLERGIES:  No Known Allergies    PAST SURGICAL HISTORY:  Past Surgical History:   Procedure Laterality Date   • Appendectomy     • Cholecystectomy     • Knee surgery  1975    Torn ligament   • Lipoma resection Right     Large lipoma resections from the right arm   • Tonsillectomy         FAMILY HISTORY:  Family History   Problem Relation Age of Onset   • Diabetes Daughter    • Macular degeneration Son    • Diabetes Maternal Aunt    • Macular degeneration Paternal Aunt    • Cancer Daughter         Bone   • Heart disease Daughter        SOCIAL HISTORY:  Social History     Tobacco Use   • Smoking status: Former      Current packs/day: 0.00     Types: Cigarettes     Quit date: 1970     Years since quittin.4   • Smokeless tobacco: Never   Vaping Use   • Vaping Use: never used   Substance Use Topics   • Alcohol use: Not Currently   • Drug use: Never       Review of Systems   Constitutional: Negative for chills, fever, malaise/fatigue, weight gain and weight loss.   HENT: Negative for hearing loss and nosebleeds.    Cardiovascular: Negative for chest pain, claudication, dyspnea on exertion, leg swelling, near-syncope, orthopnea, palpitations, paroxysmal nocturnal dyspnea and syncope.   Respiratory: Negative for shortness of breath, sleep disturbances due to breathing and snoring.    Hematologic/Lymphatic: Does not bruise/bleed easily.   Skin: Negative for color change, dry skin, rash and suspicious lesions.   Musculoskeletal: Negative for arthritis, falls and myalgias.   Gastrointestinal: Negative for hematochezia and melena.   Genitourinary: Negative for hematuria.   Neurological: Negative for dizziness, focal weakness, light-headedness and paresthesias.   Allergic/Immunologic: Negative for environmental allergies.        No new food allergies        All other systems reviewed and are negative.    OBJECTIVE:     Physical Exam   Constitutional: She appears healthy. No distress.   Vital signs reviewed   HENT:   Mouth/Throat: Oropharynx is clear.   Eyes: Conjunctivae are normal.   Neck: Thyroid normal.   Cardiovascular: Normal rate, regular rhythm, S1 normal, S2 normal, intact distal pulses and normal pulses.   Murmur heard.  High-pitched blowing holosystolic murmur is present with a grade of 1/6 at the apex.  Pulmonary/Chest: Effort normal and breath sounds normal. She exhibits no tenderness.   Abdominal: Soft. Bowel sounds are normal.   Musculoskeletal:         General: No edema. Normal range of motion.      Cervical back: Normal range of motion and neck supple.   Neurological: She is alert and oriented to person, place,  [FreeTextEntry1] : DM follow up [de-identified] : 58 yo f with DM, Sarcoidosis\par Hx of thyriod nodules and left parotid cyst\par Having right sided knee pain- no trauma - can be severe - cracks a lot.  \par REcent rash on both [place] - [place] expsure.  Began 2 months ago.  Itchy but not spreading. \par She reports lowglucose 2 times down to 50.    Has not checked it for past 2 weeks.  Takes metformin and reports she takes invokana only whenher glucose is high.  She is completely off steroids now.  \par REcently stopped working - helping with [employer].\par Daughter dx'd with SLE.\par With Dr. Barnes = she noticed right neck arterial bulging.   \par Last ophtho - more than a year\par \par no recent mammo - \par had colonoscopy with Dr Gayle and time. She has normal motor skills. Gait normal.   Skin: Skin is warm and dry. No rash noted. No cyanosis. Nails show no clubbing.           Visit Vitals  BP (!) 158/70 (BP Location: LUE - Left upper extremity, Patient Position: Sitting, Cuff Size: Small Adult)   Pulse 61   Wt 64 kg (141 lb)   SpO2 92%   BMI 25.79 kg/m²     Body mass index is 25.79 kg/m².  Wt Readings from Last 4 Encounters:   05/30/23 64 kg (141 lb)   01/31/23 67.1 kg (148 lb)   11/28/22 68.5 kg (151 lb)   09/16/22 68.8 kg (151 lb 10.8 oz)       Vital Signs and previous readings were reviewed during today's visit    DIAGNOSTIC STUDIES:   LAB RESULTS:  No results for input(s): CHOLESTEROL, HDL, TRIGLYCERIDE, CALCLDL, LDLDIR, NONHDL in the last 8765 hours.    No results found for: LIPOA    Recent Labs   Lab 09/16/22  1236   Sodium 137   Chloride 102   BUN 15   Potassium 4.4   Glucose 94   Creatinine 0.86   Calcium 9.8       No results for input(s): WBC, RBC, HGB, HCT, MCV, MCHC, RDWCV, PLT, TLYMPH in the last 8765 hours.    No results for input(s): ALB, DBIL, GPT, TP in the last 8765 hours.    Invalid input(s): TBIL, ALKP, GOT     No results for input(s): NTPROB in the last 8765 hours.     Lab Results Reviewed    The ASCVD Risk score ([place] DK, et al., 2019) failed to calculate for the following reasons:    The 2019 ASCVD risk score is only valid for ages 40 to 79    The patient has a prior MI or stroke diagnosis    No results found for: HGBA1C.         Patient's chart was reviewed for previous cardiology lab work and testing. Pertinent findings were reviewed with the patient during today's visit.    ASSESSMENT AND PLAN:   This is a 89 year old year-old female who presents with:    1. Old myocardial infarction (I25.2)   · Asymptomatic. She needs aggressive risk factor modification for secondary prevention including diet, exercise, and weight control, keeping in mind of course her advanced age and the need for realistic expectations.  EKG at the  previous office visit demonstrates sinus bradycardia with left anterior fascicular block and first-degree AV block and no ischemic ST or T wave changes.  Cannot rule out LVH as well.  This is unchanged when compared to a prior ECG.      2. Essential hypertension (I10)   ·  Blood pressure is elevated in the office again, today 158/70.  Goal BP < 150/90 given advanced age. The results of the 24-hour ambulatory blood pressure monitor performed in September 2021 demonstrated excellent blood pressure control.  Furthermore, her home BP machine is accurate today in clinic, with well controlled readings for the past month as above, SBP 120s-140s.  She very likely has true whitecoat hypertension.  I therefore will not make any changes. Will continue with current medical management of amlodipine 10 mg QD and metoprolol  mg QD.      3. Murmur (R01.1)   · 9/16/2021 2D echo demonstrating LVEF of 57% with grade 1 diastolic dysfunction, mildly calcified aortic valve with very mild stenosis.  There is also moderate mitral stenosis with mild regurgitation.  The left atrium is severely dilated.  RVSP was 49 mmHg.  Without symptoms.  Continue to monitor.  She has a repeat echocardiogram scheduled for next week per Dr Tellez.      4.  Hyperlipidemia  2/19/2021  HDL 52  LDL 77  1/2022   HDL 49 LDL 71  On atorvastatin.  Goal LDL 70-80.   At goal, follow annually.  Repeat labs ordered for prior to next visit.      Orders Placed This Encounter   • Lipid Panel With Reflex   • Comprehensive Metabolic Panel       Recommendations  1. Labs prior to next visit  2. Follow up with TTE next week and Dr Tellez in five months as scheduled       WILFRID Blanchard  Adult Nurse Practitioner  Cardiology     Instructions provided as documented in the AVS.    The patient indicated understanding of the diagnosis and agreed with the plan of care.      Greater than 50% of the visit was spent counseling regarding above issues;  total time spent 20 minutes.

## 2023-07-18 ENCOUNTER — APPOINTMENT (OUTPATIENT)
Dept: ENDOCRINOLOGY | Facility: CLINIC | Age: 63
End: 2023-07-18
Payer: MEDICAID

## 2023-07-18 VITALS
HEART RATE: 80 BPM | HEIGHT: 63 IN | OXYGEN SATURATION: 99 % | WEIGHT: 136 LBS | SYSTOLIC BLOOD PRESSURE: 134 MMHG | BODY MASS INDEX: 24.1 KG/M2 | DIASTOLIC BLOOD PRESSURE: 82 MMHG

## 2023-07-18 PROCEDURE — 99214 OFFICE O/P EST MOD 30 MIN: CPT

## 2023-07-18 RX ORDER — INSULIN LISPRO 100 U/ML
100 INJECTION, SOLUTION SUBCUTANEOUS
Qty: 1 | Refills: 5 | Status: DISCONTINUED | COMMUNITY
Start: 2022-07-08 | End: 2023-07-18

## 2023-07-18 RX ORDER — SEMAGLUTIDE 1.34 MG/ML
4 INJECTION, SOLUTION SUBCUTANEOUS
Qty: 2 | Refills: 5 | Status: DISCONTINUED | COMMUNITY
Start: 2022-11-02 | End: 2023-07-18

## 2023-07-18 RX ORDER — INSULIN GLARGINE 100 [IU]/ML
100 INJECTION, SOLUTION SUBCUTANEOUS
Qty: 2 | Refills: 3 | Status: DISCONTINUED | COMMUNITY
Start: 2021-07-21 | End: 2023-07-18

## 2023-07-18 NOTE — HISTORY OF PRESENT ILLNESS
[FreeTextEntry1] : 63 yo woman with type 2 DM, here for endocrine collow up.  \par \par Diagnosed in 2017, managed by PCP.  Complicated by neuropathy.  Diagnosis with sarcoidosis and on steroid taper intermittently. Currently not on steroid, on methotrexate. \par \par Patient is currently on:\par Ozempic 2.0mg once weekly \par \par 24 hour food recall: \par Breakfast: her mind doesn't feel like food a cup of coffee.  \par Lunch sometimes she eat lunch.\par Dinner: mostly rice, if she eats anything with flour the glucose increase.  \par \par She wasn't eating a lot of vitamin and she was feeling tired and sleepy.  \par \par Thyroid nodule, subcentimeter nodule that does not meet criteria for FNA.  Last US in July 2022.  \par \par \par  [Continuous Glucose Monitoring] : Continuous Glucose Monitoring: No

## 2023-07-18 NOTE — PHYSICAL EXAM
[Alert] : alert [Well Nourished] : well nourished [No Acute Distress] : no acute distress [Well Developed] : well developed [Normal Sclera/Conjunctiva] : normal sclera/conjunctiva [EOMI] : extra ocular movement intact [No Proptosis] : no proptosis [Normal Oropharynx] : the oropharynx was normal [Thyroid Not Enlarged] : the thyroid was not enlarged [No Thyroid Nodules] : no palpable thyroid nodules [No Respiratory Distress] : no respiratory distress [No Accessory Muscle Use] : no accessory muscle use [Clear to Auscultation] : lungs were clear to auscultation bilaterally [Normal S1, S2] : normal S1 and S2 [Regular Rhythm] : with a regular rhythm [Normal Rate] : heart rate was normal [No Edema] : no peripheral edema [Pedal Pulses Normal] : the pedal pulses are present [Normal Bowel Sounds] : normal bowel sounds [Not Tender] : non-tender [Not Distended] : not distended [Soft] : abdomen soft [Normal Anterior Cervical Nodes] : no anterior cervical lymphadenopathy [No Spinal Tenderness] : no spinal tenderness [Spine Straight] : spine straight [No Stigmata of Cushings Syndrome] : no stigmata of Cushings Syndrome [Normal Gait] : normal gait [Normal Strength/Tone] : muscle strength and tone were normal [No Rash] : no rash [Normal] : normal [Full ROM] : with full range of motion [Normal Reflexes] : deep tendon reflexes were 2+ and symmetric [No Tremors] : no tremors [Oriented x3] : oriented to person, place, and time [Acanthosis Nigricans] : no acanthosis nigricans [Diminished Throughout Both Feet] : normal tactile sensation with monofilament testing throughout both feet

## 2023-07-18 NOTE — ASSESSMENT
[FreeTextEntry1] : Patient is a 61 yo woman with uncontrolled type 2 diabetes, HTN, HLD, thyroid nodules here for endocrine care. \par \par 1. Uncontrolled Type 2 DM\par A1c 6.2% in May 11, 2023\par Patient is currently only taking Ozempic 2.0 mg once weekly which she is tolerating well.\par Endorse decreased appetite.\par Reports feeling a little bit weak but otherwise he is doing well.\par Continue with her current regimen.\par She is up-to-date with ophthalmologist foot exam done today within normal limits July 2023\par \par 2.Thyroid nodules\par Continue to monitor every 2 years\par Last ultrasound was done in July 2022\par \par 3.  Hypertension\par BP today 134/90\par Continue with losartan 25 mg once daily\par \par 4.  Hyperlipidemia \par LDL goal less than 70 mg/dL\par On atorvastatin 20mg once daily, this was recently started by her primary care doctor.\par \par 4.  Osteoporosis screening\par Done with DEXA 7/20/2021\par Repeat in 2 years\par \par Follow-up with OLY Billings in Oct\par Follow-up with me in 6 months.

## 2023-07-20 LAB
25(OH)D3 SERPL-MCNC: 14.3 NG/ML
CREAT SPEC-SCNC: 84 MG/DL
FOLATE SERPL-MCNC: 18.7 NG/ML
MICROALBUMIN 24H UR DL<=1MG/L-MCNC: <1.2 MG/DL
MICROALBUMIN/CREAT 24H UR-RTO: NORMAL MG/G
T4 FREE SERPL-MCNC: 1.2 NG/DL
TSH SERPL-ACNC: 1.54 UIU/ML
VIT B12 SERPL-MCNC: 484 PG/ML

## 2023-08-02 NOTE — DISCHARGE NOTE NURSING/CASE MANAGEMENT/SOCIAL WORK - NSDCPEPTCAREGIVEDUMATLIST _GEN_ALL_CORE
Diabetes
low acute risk given strongly denying SI, names significant protective factors, appears stable on exam, is future oriented

## 2023-10-07 ENCOUNTER — RX RENEWAL (OUTPATIENT)
Age: 63
End: 2023-10-07

## 2023-10-09 ENCOUNTER — APPOINTMENT (OUTPATIENT)
Dept: ENDOCRINOLOGY | Facility: CLINIC | Age: 63
End: 2023-10-09

## 2023-10-09 ENCOUNTER — RX RENEWAL (OUTPATIENT)
Age: 63
End: 2023-10-09

## 2023-10-19 ENCOUNTER — RX RENEWAL (OUTPATIENT)
Age: 63
End: 2023-10-19

## 2023-10-25 ENCOUNTER — APPOINTMENT (OUTPATIENT)
Dept: ENDOCRINOLOGY | Facility: CLINIC | Age: 63
End: 2023-10-25
Payer: MEDICAID

## 2023-10-25 VITALS
DIASTOLIC BLOOD PRESSURE: 72 MMHG | WEIGHT: 132 LBS | OXYGEN SATURATION: 99 % | SYSTOLIC BLOOD PRESSURE: 120 MMHG | BODY MASS INDEX: 23.39 KG/M2 | HEIGHT: 63 IN | HEART RATE: 92 BPM

## 2023-10-25 PROCEDURE — 82962 GLUCOSE BLOOD TEST: CPT

## 2023-10-25 PROCEDURE — 99214 OFFICE O/P EST MOD 30 MIN: CPT | Mod: 25

## 2023-10-25 PROCEDURE — 83036 HEMOGLOBIN GLYCOSYLATED A1C: CPT | Mod: QW

## 2023-10-26 LAB — HBA1C MFR BLD HPLC: 6.4

## 2023-11-01 LAB
25(OH)D3 SERPL-MCNC: 23.3 NG/ML
ALBUMIN SERPL ELPH-MCNC: 4.3 G/DL
ALP BLD-CCNC: 193 U/L
ALT SERPL-CCNC: 32 U/L
ANION GAP SERPL CALC-SCNC: 14 MMOL/L
AST SERPL-CCNC: 24 U/L
BASOPHILS # BLD AUTO: 0.03 K/UL
BASOPHILS NFR BLD AUTO: 0.5 %
BILIRUB SERPL-MCNC: 0.6 MG/DL
BUN SERPL-MCNC: 18 MG/DL
CALCIUM SERPL-MCNC: 10.6 MG/DL
CALCIUM SERPL-MCNC: 10.6 MG/DL
CHLORIDE SERPL-SCNC: 102 MMOL/L
CHOLEST SERPL-MCNC: 169 MG/DL
CO2 SERPL-SCNC: 22 MMOL/L
CREAT SERPL-MCNC: 0.8 MG/DL
CREAT SPEC-SCNC: 63 MG/DL
EGFR: 82 ML/MIN/1.73M2
EOSINOPHIL # BLD AUTO: 0.23 K/UL
EOSINOPHIL NFR BLD AUTO: 4.2 %
GLUCOSE BLDC GLUCOMTR-MCNC: 104
GLUCOSE SERPL-MCNC: 103 MG/DL
HCT VFR BLD CALC: 38.8 %
HDLC SERPL-MCNC: 50 MG/DL
HGB BLD-MCNC: 12.9 G/DL
IMM GRANULOCYTES NFR BLD AUTO: 0.4 %
LDLC SERPL CALC-MCNC: 91 MG/DL
LYMPHOCYTES # BLD AUTO: 1.42 K/UL
LYMPHOCYTES NFR BLD AUTO: 25.8 %
MAN DIFF?: NORMAL
MCHC RBC-ENTMCNC: 31.2 PG
MCHC RBC-ENTMCNC: 33.2 GM/DL
MCV RBC AUTO: 93.7 FL
MICROALBUMIN 24H UR DL<=1MG/L-MCNC: <1.2 MG/DL
MICROALBUMIN/CREAT 24H UR-RTO: NORMAL MG/G
MONOCYTES # BLD AUTO: 0.75 K/UL
MONOCYTES NFR BLD AUTO: 13.6 %
NEUTROPHILS # BLD AUTO: 3.05 K/UL
NEUTROPHILS NFR BLD AUTO: 55.5 %
NONHDLC SERPL-MCNC: 119 MG/DL
PARATHYROID HORMONE INTACT: 11 PG/ML
PLATELET # BLD AUTO: 193 K/UL
POTASSIUM SERPL-SCNC: 4.9 MMOL/L
PROT SERPL-MCNC: 7.6 G/DL
RBC # BLD: 4.14 M/UL
RBC # FLD: 13.1 %
SODIUM SERPL-SCNC: 138 MMOL/L
T3 SERPL-MCNC: 85 NG/DL
T4 FREE SERPL-MCNC: 1.3 NG/DL
TRIGL SERPL-MCNC: 159 MG/DL
TSH SERPL-ACNC: 1.35 UIU/ML
WBC # FLD AUTO: 5.5 K/UL

## 2023-11-14 ENCOUNTER — RX RENEWAL (OUTPATIENT)
Age: 63
End: 2023-11-14

## 2023-11-29 ENCOUNTER — RESULT REVIEW (OUTPATIENT)
Age: 63
End: 2023-11-29

## 2023-11-29 ENCOUNTER — OUTPATIENT (OUTPATIENT)
Dept: OUTPATIENT SERVICES | Facility: HOSPITAL | Age: 63
LOS: 1 days | End: 2023-11-29
Payer: COMMERCIAL

## 2023-11-29 ENCOUNTER — APPOINTMENT (OUTPATIENT)
Dept: INTERNAL MEDICINE | Facility: CLINIC | Age: 63
End: 2023-11-29
Payer: MEDICAID

## 2023-11-29 ENCOUNTER — APPOINTMENT (OUTPATIENT)
Dept: RADIOLOGY | Facility: CLINIC | Age: 63
End: 2023-11-29
Payer: MEDICAID

## 2023-11-29 VITALS
DIASTOLIC BLOOD PRESSURE: 70 MMHG | OXYGEN SATURATION: 99 % | WEIGHT: 132 LBS | SYSTOLIC BLOOD PRESSURE: 125 MMHG | HEART RATE: 95 BPM | BODY MASS INDEX: 23.39 KG/M2 | HEIGHT: 63 IN

## 2023-11-29 DIAGNOSIS — Z41.9 ENCOUNTER FOR PROCEDURE FOR PURPOSES OTHER THAN REMEDYING HEALTH STATE, UNSPECIFIED: Chronic | ICD-10-CM

## 2023-11-29 DIAGNOSIS — Z12.4 ENCOUNTER FOR SCREENING FOR MALIGNANT NEOPLASM OF CERVIX: ICD-10-CM

## 2023-11-29 DIAGNOSIS — I25.10 ATHEROSCLEROTIC HEART DISEASE OF NATIVE CORONARY ARTERY W/OUT ANGINA PECTORIS: ICD-10-CM

## 2023-11-29 DIAGNOSIS — M54.2 CERVICALGIA: ICD-10-CM

## 2023-11-29 DIAGNOSIS — Z98.890 OTHER SPECIFIED POSTPROCEDURAL STATES: Chronic | ICD-10-CM

## 2023-11-29 DIAGNOSIS — Z00.00 ENCOUNTER FOR GENERAL ADULT MEDICAL EXAMINATION W/OUT ABNORMAL FINDINGS: ICD-10-CM

## 2023-11-29 DIAGNOSIS — Z12.39 ENCOUNTER FOR OTHER SCREENING FOR MALIGNANT NEOPLASM OF BREAST: ICD-10-CM

## 2023-11-29 PROCEDURE — 72040 X-RAY EXAM NECK SPINE 2-3 VW: CPT | Mod: 26

## 2023-11-29 PROCEDURE — 99396 PREV VISIT EST AGE 40-64: CPT | Mod: 25

## 2023-11-29 PROCEDURE — 36415 COLL VENOUS BLD VENIPUNCTURE: CPT

## 2023-11-29 PROCEDURE — 72040 X-RAY EXAM NECK SPINE 2-3 VW: CPT

## 2023-11-29 PROCEDURE — 99386 PREV VISIT NEW AGE 40-64: CPT | Mod: 25

## 2023-11-29 RX ORDER — ATORVASTATIN CALCIUM 10 MG/1
10 TABLET, FILM COATED ORAL
Qty: 90 | Refills: 3 | Status: ACTIVE | COMMUNITY
Start: 2021-05-11 | End: 1900-01-01

## 2023-11-29 RX ORDER — LOSARTAN POTASSIUM 25 MG/1
25 TABLET, FILM COATED ORAL
Qty: 90 | Refills: 3 | Status: ACTIVE | COMMUNITY
Start: 2021-05-11 | End: 1900-01-01

## 2023-12-08 ENCOUNTER — APPOINTMENT (OUTPATIENT)
Dept: INTERNAL MEDICINE | Facility: CLINIC | Age: 63
End: 2023-12-08

## 2023-12-14 ENCOUNTER — APPOINTMENT (OUTPATIENT)
Dept: RHEUMATOLOGY | Facility: CLINIC | Age: 63
End: 2023-12-14
Payer: MEDICAID

## 2023-12-14 VITALS
TEMPERATURE: 97.1 F | HEIGHT: 63 IN | HEART RATE: 91 BPM | OXYGEN SATURATION: 98 % | RESPIRATION RATE: 16 BRPM | SYSTOLIC BLOOD PRESSURE: 124 MMHG | WEIGHT: 129 LBS | BODY MASS INDEX: 22.86 KG/M2 | DIASTOLIC BLOOD PRESSURE: 83 MMHG

## 2023-12-14 DIAGNOSIS — Z86.2 PERSONAL HISTORY OF DISEASES OF THE BLOOD AND BLOOD-FORMING ORGANS AND CERTAIN DISORDERS INVOLVING THE IMMUNE MECHANISM: ICD-10-CM

## 2023-12-14 DIAGNOSIS — R63.4 ABNORMAL WEIGHT LOSS: ICD-10-CM

## 2023-12-14 PROCEDURE — 99214 OFFICE O/P EST MOD 30 MIN: CPT

## 2023-12-19 PROBLEM — Z86.2 HISTORY OF SARCOIDOSIS: Status: RESOLVED | Noted: 2019-05-07 | Resolved: 2023-12-14

## 2023-12-19 NOTE — PHYSICAL EXAM
[General Appearance - Alert] : alert [General Appearance - In No Acute Distress] : in no acute distress [Edema] : there was no peripheral edema [Full Pulse] : the pedal pulses are present [FreeTextEntry1] : mild tenderness epigastric region [Abnormal Walk] : normal gait [Nail Clubbing] : no clubbing  or cyanosis of the fingernails [Musculoskeletal - Swelling] : no joint swelling seen [Motor Tone] : muscle strength and tone were normal [Oriented To Time, Place, And Person] : oriented to person, place, and time [Impaired Insight] : insight and judgment were intact [Affect] : the affect was normal

## 2023-12-19 NOTE — HISTORY OF PRESENT ILLNESS
[FreeTextEntry1] : source of history: patient, chart 64 yo woman with DM (controlled with Ozempic), diabetic neuropathy, thyroid nodules, Hep B core Ab positive  Sarcoidosis, diagnosed in 2019, when she presented with 30Ibs weight loss for 3 months.  She was found to have lymphadenopathy. 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 She was treated with steroids but then tapered off. She was then treated with methotrexate.  She has not had a f/u with rheumatologist since 2021 as her rheumatologist left the practice.   She now presents to establish care.  She was seen her endocrinologist for c/o ongoing weight loss, fatigue, and pain in the ribs.  These are similar symptoms with which she presented when she had originally diagnosed with sarcoidosis and was asked to re-establish care with rheumatology.  Since making this appointment she has been feeling better, however.  Although the weight loss is expected with Ozempic, ongoing weight loss was concerning.  She now c/o pain in the lower back. Pt denies fever, loss of bowel/bladder control, extremity weakness, or saddle anesthesia. She denies abdominal pain, nausea, vomiting, rashes, fevers, chills, cough or shortness of breath.  Malignancy screening: PAP and mammogram next months; last colonoscopy was 3 years.   PET-CT 2019: Diffuse lymphadenopathy in the mediastinum, bilateral hilar regions and periesophageal lymph nodes near the gastroesophageal junction. Most likely diagnosis is lymphoma, but biopsy is pending. Nonspecific activity in the distal pancreas with a focal area of somewhat greater activity at the pancreatic tail. No abnormalities are seen on recent CT scan of this area and the significance of this uptake is unclear. No abnormal thyroid uptake is seen.

## 2023-12-19 NOTE — ASSESSMENT
[FreeTextEntry1] : check labs as outlined below including amylase and lipase given previously abnormal pancreatic imaging and epigastric pain Once labs reviewed, consider repeat imaging for further evaluation of sarcoidosis age-appropriate malignancy screening pending next month hold off on immunosuppression at this [facility].   OV 1 month, sooner PRN

## 2023-12-25 LAB
ACE BLD-CCNC: 155 U/L
ALBUMIN SERPL ELPH-MCNC: 4.2 G/DL
ALP BLD-CCNC: 155 U/L
ALT SERPL-CCNC: 28 U/L
AMYLASE/CREAT SERPL: 58 U/L
ANION GAP SERPL CALC-SCNC: 11 MMOL/L
AST SERPL-CCNC: 23 U/L
BASOPHILS # BLD AUTO: 0.02 K/UL
BASOPHILS NFR BLD AUTO: 0.5 %
BILIRUB SERPL-MCNC: 0.6 MG/DL
BUN SERPL-MCNC: 19 MG/DL
CALCIUM SERPL-MCNC: 9.6 MG/DL
CHLORIDE SERPL-SCNC: 103 MMOL/L
CO2 SERPL-SCNC: 24 MMOL/L
CREAT SERPL-MCNC: 0.74 MG/DL
CRP SERPL-MCNC: 3 MG/L
EGFR: 90 ML/MIN/1.73M2
EOSINOPHIL # BLD AUTO: 0.2 K/UL
EOSINOPHIL NFR BLD AUTO: 5.2 %
ERYTHROCYTE [SEDIMENTATION RATE] IN BLOOD BY WESTERGREN METHOD: 13 MM/HR
HAV IGM SER QL: NONREACTIVE
HBV CORE IGG+IGM SER QL: REACTIVE
HBV CORE IGM SER QL: NONREACTIVE
HBV SURFACE AG SER QL: NONREACTIVE
HCT VFR BLD CALC: 37.8 %
HCV AB SER QL: NONREACTIVE
HCV S/CO RATIO: 0.15 S/CO
HGB BLD-MCNC: 12.8 G/DL
IMM GRANULOCYTES NFR BLD AUTO: 0.3 %
LPL SERPL-CCNC: 23 U/L
LYMPHOCYTES # BLD AUTO: 1.22 K/UL
LYMPHOCYTES NFR BLD AUTO: 31.7 %
M TB IFN-G BLD-IMP: NEGATIVE
MAN DIFF?: NORMAL
MCHC RBC-ENTMCNC: 32.1 PG
MCHC RBC-ENTMCNC: 33.9 GM/DL
MCV RBC AUTO: 94.7 FL
MONOCYTES # BLD AUTO: 0.55 K/UL
MONOCYTES NFR BLD AUTO: 14.3 %
NEUTROPHILS # BLD AUTO: 1.85 K/UL
NEUTROPHILS NFR BLD AUTO: 48 %
PLATELET # BLD AUTO: 185 K/UL
POTASSIUM SERPL-SCNC: 4.6 MMOL/L
PROT SERPL-MCNC: 6.9 G/DL
QUANTIFERON TB PLUS MITOGEN MINUS NIL: 6.51 IU/ML
QUANTIFERON TB PLUS NIL: 0.09 IU/ML
QUANTIFERON TB PLUS TB1 MINUS NIL: 0.02 IU/ML
QUANTIFERON TB PLUS TB2 MINUS NIL: 0.03 IU/ML
RBC # BLD: 3.99 M/UL
RBC # FLD: 12.8 %
SODIUM SERPL-SCNC: 138 MMOL/L
WBC # FLD AUTO: 3.85 K/UL

## 2024-01-05 ENCOUNTER — APPOINTMENT (OUTPATIENT)
Dept: RHEUMATOLOGY | Facility: CLINIC | Age: 64
End: 2024-01-05
Payer: MEDICARE

## 2024-01-05 VITALS
WEIGHT: 131 LBS | OXYGEN SATURATION: 98 % | DIASTOLIC BLOOD PRESSURE: 93 MMHG | BODY MASS INDEX: 23.21 KG/M2 | HEART RATE: 84 BPM | HEIGHT: 63 IN | SYSTOLIC BLOOD PRESSURE: 144 MMHG

## 2024-01-05 DIAGNOSIS — R05.8 OTHER SPECIFIED COUGH: ICD-10-CM

## 2024-01-05 DIAGNOSIS — R63.4 ABNORMAL WEIGHT LOSS: ICD-10-CM

## 2024-01-05 DIAGNOSIS — R68.2 DRY MOUTH, UNSPECIFIED: ICD-10-CM

## 2024-01-05 PROCEDURE — 99214 OFFICE O/P EST MOD 30 MIN: CPT

## 2024-01-09 NOTE — HISTORY OF PRESENT ILLNESS
[FreeTextEntry1] : source of history: patient, chart 62 yo woman with DM (controlled with Ozempic), diabetic neuropathy, thyroid nodules, Hep B core Ab positive  Sarcoidosis, diagnosed in 2019, when she presented with 30Ibs weight loss for 3 months.  She was found to have lymphadenopathy. 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 She was treated with steroids but then tapered off. She was then treated with methotrexate.  She has not had a f/u with rheumatologist since 2021 as her rheumatologist left the practice.   She now presents to establish care.  She was seen her endocrinologist for c/o ongoing weight loss, fatigue, and pain in the ribs.  These are similar symptoms with which she presented when she had originally diagnosed with sarcoidosis and was asked to re-establish care with rheumatology.  Since making this appointment she has been feeling better, however.  Although the weight loss is expected with Ozempic, ongoing weight loss was concerning.  She now c/o pain in the lower back. Pt denies fever, loss of bowel/bladder control, extremity weakness, or saddle anesthesia. She denies abdominal pain, nausea, vomiting, rashes, fevers, chills, cough or shortness of breath.  Malignancy screening: PAP and mammogram next months; last colonoscopy was 3 years.   PET-CT 2019: Diffuse lymphadenopathy in the mediastinum, bilateral hilar regions and periesophageal lymph nodes near the gastroesophageal junction. Most likely diagnosis is lymphoma, but biopsy is pending. Nonspecific activity in the distal pancreas with a focal area of somewhat greater activity at the pancreatic tail. No abnormalities are seen on recent CT scan of this area and the significance of this uptake is unclear. No abnormal thyroid uptake is seen.  Interval history ____________ Patient present for a follow up visit today.   c/o right sided neck pain with radiation to the right upper extremity with associated numbness in the 4th and 5th digit of the hands. s/p MRI of the cervical spine done by PCP.  pt also reports memory issues for few months still with intermittent abdominal pain; no nausea;  weight has been stable over the last 3 weeks.  no chest pain, chough or shortness of breath +dry cough and dry mouth

## 2024-01-09 NOTE — ASSESSMENT
[FreeTextEntry1] : Patient with history of sarcoidosis on methotrexate prior now presenting with abdominal pain, weight loss in the setting of elevated ACE level. concern for sarcoidosis. Given patient' age and symptoms of weight loss, malignancy is on the differential as well.  will obtain PET/CT for further evaluation MRI reviewed with patient; consider pain management evaluation Neurology evaluation for memory issues.  patient to f/u after imaging.

## 2024-01-09 NOTE — DATA REVIEWED
[FreeTextEntry1] : ACE level elevated nl amylase and lipase Hep B core Ab: positive elevated alk phos

## 2024-01-09 NOTE — PHYSICAL EXAM
[General Appearance - Alert] : alert [General Appearance - In No Acute Distress] : in no acute distress [Full Pulse] : the pedal pulses are present [Edema] : there was no peripheral edema [FreeTextEntry1] : diffuse abdominal tenderness [Abnormal Walk] : normal gait [Nail Clubbing] : no clubbing  or cyanosis of the fingernails [Musculoskeletal - Swelling] : no joint swelling seen [Motor Tone] : muscle strength and tone were normal [Oriented To Time, Place, And Person] : oriented to person, place, and time [Impaired Insight] : insight and judgment were intact [Affect] : the affect was normal

## 2024-01-10 ENCOUNTER — APPOINTMENT (OUTPATIENT)
Dept: MAMMOGRAPHY | Facility: IMAGING CENTER | Age: 64
End: 2024-01-10
Payer: MEDICARE

## 2024-01-10 ENCOUNTER — RESULT REVIEW (OUTPATIENT)
Age: 64
End: 2024-01-10

## 2024-01-10 ENCOUNTER — APPOINTMENT (OUTPATIENT)
Dept: ULTRASOUND IMAGING | Facility: IMAGING CENTER | Age: 64
End: 2024-01-10
Payer: MEDICARE

## 2024-01-10 ENCOUNTER — OUTPATIENT (OUTPATIENT)
Dept: OUTPATIENT SERVICES | Facility: HOSPITAL | Age: 64
LOS: 1 days | End: 2024-01-10
Payer: COMMERCIAL

## 2024-01-10 DIAGNOSIS — Z12.39 ENCOUNTER FOR OTHER SCREENING FOR MALIGNANT NEOPLASM OF BREAST: ICD-10-CM

## 2024-01-10 DIAGNOSIS — Z41.9 ENCOUNTER FOR PROCEDURE FOR PURPOSES OTHER THAN REMEDYING HEALTH STATE, UNSPECIFIED: Chronic | ICD-10-CM

## 2024-01-10 DIAGNOSIS — Z98.890 OTHER SPECIFIED POSTPROCEDURAL STATES: Chronic | ICD-10-CM

## 2024-01-10 PROCEDURE — 77063 BREAST TOMOSYNTHESIS BI: CPT | Mod: 26

## 2024-01-10 PROCEDURE — 77067 SCR MAMMO BI INCL CAD: CPT | Mod: 26

## 2024-01-10 PROCEDURE — 77063 BREAST TOMOSYNTHESIS BI: CPT

## 2024-01-10 PROCEDURE — 77067 SCR MAMMO BI INCL CAD: CPT

## 2024-01-10 PROCEDURE — 76641 ULTRASOUND BREAST COMPLETE: CPT

## 2024-01-10 PROCEDURE — 76641 ULTRASOUND BREAST COMPLETE: CPT | Mod: 26,50

## 2024-01-12 ENCOUNTER — LABORATORY RESULT (OUTPATIENT)
Age: 64
End: 2024-01-12

## 2024-01-12 ENCOUNTER — OUTPATIENT (OUTPATIENT)
Dept: OUTPATIENT SERVICES | Facility: HOSPITAL | Age: 64
LOS: 1 days | End: 2024-01-12
Payer: COMMERCIAL

## 2024-01-12 ENCOUNTER — RESULT REVIEW (OUTPATIENT)
Age: 64
End: 2024-01-12

## 2024-01-12 ENCOUNTER — APPOINTMENT (OUTPATIENT)
Dept: OBGYN | Facility: CLINIC | Age: 64
End: 2024-01-12
Payer: MEDICARE

## 2024-01-12 DIAGNOSIS — Z41.9 ENCOUNTER FOR PROCEDURE FOR PURPOSES OTHER THAN REMEDYING HEALTH STATE, UNSPECIFIED: Chronic | ICD-10-CM

## 2024-01-12 DIAGNOSIS — Z01.419 ENCOUNTER FOR GYNECOLOGICAL EXAMINATION (GENERAL) (ROUTINE) W/OUT ABNORMAL FINDINGS: ICD-10-CM

## 2024-01-12 DIAGNOSIS — Z98.890 OTHER SPECIFIED POSTPROCEDURAL STATES: Chronic | ICD-10-CM

## 2024-01-12 DIAGNOSIS — R10.31 RIGHT LOWER QUADRANT PAIN: ICD-10-CM

## 2024-01-12 DIAGNOSIS — N76.0 ACUTE VAGINITIS: ICD-10-CM

## 2024-01-12 DIAGNOSIS — Z11.3 ENCOUNTER FOR SCREENING FOR INFECTIONS WITH A PREDOMINANTLY SEXUAL MODE OF TRANSMISSION: ICD-10-CM

## 2024-01-12 DIAGNOSIS — Z78.0 ASYMPTOMATIC MENOPAUSAL STATE: ICD-10-CM

## 2024-01-12 PROCEDURE — G0101: CPT

## 2024-01-12 PROCEDURE — 36415 COLL VENOUS BLD VENIPUNCTURE: CPT

## 2024-01-12 PROCEDURE — 87624 HPV HI-RISK TYP POOLED RSLT: CPT

## 2024-01-12 PROCEDURE — 87340 HEPATITIS B SURFACE AG IA: CPT

## 2024-01-12 PROCEDURE — 86780 TREPONEMA PALLIDUM: CPT

## 2024-01-12 PROCEDURE — 87389 HIV-1 AG W/HIV-1&-2 AB AG IA: CPT

## 2024-01-12 PROCEDURE — 86803 HEPATITIS C AB TEST: CPT

## 2024-01-12 NOTE — HISTORY OF PRESENT ILLNESS
[___ Month(s) Ago] : [unfilled] month(s) ago [Right Lower Quadrant] : right lower quadrant [Post-Menopause, No Sxs] : post-menopausal, currently without symptoms [Menopause Age: ____] : age at menopause was [unfilled] [Good] : being in good health [Last Mammogram ___] : Last Mammogram was [unfilled] [Last Bone Density ___] : Last bone density studies [unfilled] [Last Colonoscopy ___] : Last colonoscopy [unfilled] [Postmenopausal] : is postmenopausal [Pregnancy History] : pregnancy history: [Up to Date] : not up to date with ~his/her~ STD screening [HPV Vaccine NA Due to Age] : HPV vaccine not available to patient due to age [Sexually Active] : is not sexually active

## 2024-01-12 NOTE — PHYSICAL EXAM
[Awake] : awake [Alert] : alert [Acute Distress] : no acute distress [Mass] : no breast mass [Nipple Discharge] : no nipple discharge [Axillary LAD] : no axillary lymphadenopathy [Soft] : soft [Tender] : tender [Distended] : not distended [H/Smegaly] : no hepatosplenomegaly [Flat] : flat [RUQ] : not in the RUQ [RLQ] : in the right lower quadrant [LUQ] : not in the LUQ [LLQ] : not in the LLQ [Firm] : not firm [Rigid] : not rigid [Rebound] : no rebound [Guarding] : no guarding [No Mass] : no masses were palpated [No HSM] : no hepatosplenomegaly noted [Oriented x3] : oriented to person, place, and time [Depressed Mood] : not depressed [Flat Affect] : affect not flat [Normal] : uterus [Labia Majora] : labia major [Labia Minora] : labia minora [Atrophy] : atrophy [Dry Mucosa] : dry mucosa [No Bleeding] : there was no active vaginal bleeding [Pap Obtained] : a Pap smear was performed [Motion Tenderness] : there was no cervical motion tenderness [Normal Position] : in a normal position [Uterine Adnexae] : were not tender and not enlarged [FreeTextEntry6] : no fullness, tenderness or masses

## 2024-01-12 NOTE — REVIEW OF SYSTEMS
[Abdominal Pain] : no abdominal pain [Vomiting] : no vomiting [Diarrhea] : no diarrhea [Pelvic Pain] : no pelvic pain [Abn Vag Bleeding] : no abnormal vaginal bleeding

## 2024-01-13 LAB
HBV SURFACE AG SER-ACNC: SIGNIFICANT CHANGE UP
HBV SURFACE AG SER-ACNC: SIGNIFICANT CHANGE UP
HCV AB S/CO SERPL IA: 0.23 S/CO — SIGNIFICANT CHANGE UP (ref 0–0.99)
HCV AB S/CO SERPL IA: 0.23 S/CO — SIGNIFICANT CHANGE UP (ref 0–0.99)
HCV AB SERPL-IMP: SIGNIFICANT CHANGE UP
HCV AB SERPL-IMP: SIGNIFICANT CHANGE UP
HIV 1+2 AB+HIV1 P24 AG SERPL QL IA: SIGNIFICANT CHANGE UP
HIV 1+2 AB+HIV1 P24 AG SERPL QL IA: SIGNIFICANT CHANGE UP
T PALLIDUM AB TITR SER: NEGATIVE — SIGNIFICANT CHANGE UP
T PALLIDUM AB TITR SER: NEGATIVE — SIGNIFICANT CHANGE UP

## 2024-01-15 LAB — HPV HIGH+LOW RISK DNA PNL CVX: SIGNIFICANT CHANGE UP

## 2024-01-16 DIAGNOSIS — R10.31 RIGHT LOWER QUADRANT PAIN: ICD-10-CM

## 2024-01-16 DIAGNOSIS — Z78.0 ASYMPTOMATIC MENOPAUSAL STATE: ICD-10-CM

## 2024-01-16 DIAGNOSIS — Z01.419 ENCOUNTER FOR GYNECOLOGICAL EXAMINATION (GENERAL) (ROUTINE) WITHOUT ABNORMAL FINDINGS: ICD-10-CM

## 2024-01-16 DIAGNOSIS — Z11.3 ENCOUNTER FOR SCREENING FOR INFECTIONS WITH A PREDOMINANTLY SEXUAL MODE OF TRANSMISSION: ICD-10-CM

## 2024-01-17 ENCOUNTER — APPOINTMENT (OUTPATIENT)
Dept: MAMMOGRAPHY | Facility: IMAGING CENTER | Age: 64
End: 2024-01-17
Payer: MEDICARE

## 2024-01-17 ENCOUNTER — OUTPATIENT (OUTPATIENT)
Dept: OUTPATIENT SERVICES | Facility: HOSPITAL | Age: 64
LOS: 1 days | End: 2024-01-17
Payer: COMMERCIAL

## 2024-01-17 ENCOUNTER — RESULT REVIEW (OUTPATIENT)
Age: 64
End: 2024-01-17

## 2024-01-17 DIAGNOSIS — Z98.890 OTHER SPECIFIED POSTPROCEDURAL STATES: Chronic | ICD-10-CM

## 2024-01-17 DIAGNOSIS — Z41.9 ENCOUNTER FOR PROCEDURE FOR PURPOSES OTHER THAN REMEDYING HEALTH STATE, UNSPECIFIED: Chronic | ICD-10-CM

## 2024-01-17 DIAGNOSIS — Z00.8 ENCOUNTER FOR OTHER GENERAL EXAMINATION: ICD-10-CM

## 2024-01-17 LAB — CYTOLOGY SPEC DOC CYTO: SIGNIFICANT CHANGE UP

## 2024-01-17 PROCEDURE — 77065 DX MAMMO INCL CAD UNI: CPT

## 2024-01-17 PROCEDURE — 77065 DX MAMMO INCL CAD UNI: CPT | Mod: 26,RT

## 2024-01-25 RX ORDER — BLOOD SUGAR DIAGNOSTIC
STRIP MISCELLANEOUS 3 TIMES DAILY
Qty: 3 | Refills: 1 | Status: ACTIVE | COMMUNITY
Start: 2024-01-25

## 2024-01-25 RX ORDER — PEN NEEDLE, DIABETIC 29 G X1/2"
32G X 4 MM NEEDLE, DISPOSABLE MISCELLANEOUS
Qty: 4 | Refills: 1 | Status: ACTIVE | COMMUNITY
Start: 2021-07-19

## 2024-01-26 ENCOUNTER — APPOINTMENT (OUTPATIENT)
Dept: ULTRASOUND IMAGING | Facility: IMAGING CENTER | Age: 64
End: 2024-01-26
Payer: MEDICARE

## 2024-01-26 ENCOUNTER — OUTPATIENT (OUTPATIENT)
Dept: OUTPATIENT SERVICES | Facility: HOSPITAL | Age: 64
LOS: 1 days | End: 2024-01-26
Payer: COMMERCIAL

## 2024-01-26 DIAGNOSIS — R10.31 RIGHT LOWER QUADRANT PAIN: ICD-10-CM

## 2024-01-26 DIAGNOSIS — Z41.9 ENCOUNTER FOR PROCEDURE FOR PURPOSES OTHER THAN REMEDYING HEALTH STATE, UNSPECIFIED: Chronic | ICD-10-CM

## 2024-01-26 DIAGNOSIS — Z98.890 OTHER SPECIFIED POSTPROCEDURAL STATES: Chronic | ICD-10-CM

## 2024-01-26 PROCEDURE — 76830 TRANSVAGINAL US NON-OB: CPT

## 2024-01-26 PROCEDURE — 76830 TRANSVAGINAL US NON-OB: CPT | Mod: 26

## 2024-01-26 PROCEDURE — 76856 US EXAM PELVIC COMPLETE: CPT

## 2024-01-26 PROCEDURE — 76856 US EXAM PELVIC COMPLETE: CPT | Mod: 26

## 2024-01-27 ENCOUNTER — APPOINTMENT (OUTPATIENT)
Dept: NUCLEAR MEDICINE | Facility: IMAGING CENTER | Age: 64
End: 2024-01-27
Payer: MEDICARE

## 2024-01-27 ENCOUNTER — OUTPATIENT (OUTPATIENT)
Dept: OUTPATIENT SERVICES | Facility: HOSPITAL | Age: 64
LOS: 1 days | End: 2024-01-27
Payer: COMMERCIAL

## 2024-01-27 ENCOUNTER — APPOINTMENT (OUTPATIENT)
Dept: NUCLEAR MEDICINE | Facility: IMAGING CENTER | Age: 64
End: 2024-01-27

## 2024-01-27 DIAGNOSIS — D86.9 SARCOIDOSIS, UNSPECIFIED: ICD-10-CM

## 2024-01-27 DIAGNOSIS — Z41.9 ENCOUNTER FOR PROCEDURE FOR PURPOSES OTHER THAN REMEDYING HEALTH STATE, UNSPECIFIED: Chronic | ICD-10-CM

## 2024-01-27 DIAGNOSIS — R63.4 ABNORMAL WEIGHT LOSS: ICD-10-CM

## 2024-01-27 DIAGNOSIS — Z98.890 OTHER SPECIFIED POSTPROCEDURAL STATES: Chronic | ICD-10-CM

## 2024-01-27 DIAGNOSIS — R05.8 OTHER SPECIFIED COUGH: ICD-10-CM

## 2024-01-27 DIAGNOSIS — Z00.8 ENCOUNTER FOR OTHER GENERAL EXAMINATION: ICD-10-CM

## 2024-01-27 PROCEDURE — A9552: CPT

## 2024-01-27 PROCEDURE — 78816 PET IMAGE W/CT FULL BODY: CPT | Mod: 26

## 2024-01-27 PROCEDURE — 78816 PET IMAGE W/CT FULL BODY: CPT

## 2024-02-02 ENCOUNTER — NON-APPOINTMENT (OUTPATIENT)
Age: 64
End: 2024-02-02

## 2024-02-03 ENCOUNTER — APPOINTMENT (OUTPATIENT)
Dept: MRI IMAGING | Facility: IMAGING CENTER | Age: 64
End: 2024-02-03

## 2024-02-06 ENCOUNTER — TRANSCRIPTION ENCOUNTER (OUTPATIENT)
Age: 64
End: 2024-02-06

## 2024-02-06 ENCOUNTER — RESULT REVIEW (OUTPATIENT)
Age: 64
End: 2024-02-06

## 2024-02-06 DIAGNOSIS — R59.1 GENERALIZED ENLARGED LYMPH NODES: ICD-10-CM

## 2024-02-06 NOTE — ED PROVIDER NOTE - CARDIAC, MLM
Physician Discharge Summary     Patient ID:  Cj Marsh  [id number]  48 year old  1975    Admit date: 2/1/2024    Discharge date and time: 2/6/2024    Admitting Physician: Joseph Bermeo MD     Discharge Physician: Eddi Loya MD    Admission Diagnoses: STEMI (ST elevation myocardial infarction) (CMD) [I21.3]    Discharge Diagnoses:     I21.3 STEMI  I50.22 Chronic systolic (congestive) heart failure  I51.3 LV thrombus .  E78.5     Hyperlipidemia, unspecified       Concurrent Diagnoses:  Patient Active Problem List   Diagnosis    Unspecified asthma(493.90)    Lipidoses    Prediabetes    Hyperlipidemia       Admission Condition: fair    Discharged Condition: stable    Hospital Course:     Cj Marsh is a 48 year old male w/ PMH significant for tobacco use and HLD who presented to the ED 2/1 with chest pressure and accompanying shortness of breath that began around 8 PM on 1/31. He reported improvement in his symptoms and stated they had markedly improved by the time he presented to the ED. Initial EKG in ED with age indeterminate anterolateral infarct. Troponin 14k. While in ED, patient returned to bed from using the restroom and subsequently sustained a v.fib arrest. ROSC achieved after 3 minutes, defibrillation x1 and 600 mg amiodarone bolus. He was intubated post-arrest. Subsequent EKG with acute STEMI, he was urgently transferred to [facility] for cardiac catheterization and ICU admission.      Presented to CICU s/p [facility] which revealed mid LAD lesion with 100% stenosis s/p YURIY and IABP placement. LVEF 39% with apical hypokinesis. Upon arrival to ICU, patient sedated but opens eyes, follows commands throughout. Critical care consulted for assistance with ventilator management.     Patient was eventually extubated, balloon pump was removed, transferred to the floor in stable conditions where he was followed by Cardiology, underwent cardiac MRI as below,    Plan is to continue with aspirin and Brilinta,  repeat echocardiogram with improvement in left ventricle function, he is noted to have a left apical thrombus and remains on Eliquis.        Hospital course uncomplicated otherwise and patient stable for discharge.          Cardiac MRI:    IMPRESSION:     1.   Findings consistent with recent LAD occlusion status post  revascularization with transmural delayed enhancement throughout the  anterior left ventricle wall as well as the mid/apical septum and apical  inferior wall. Additionally, marked myocardial edema throughout the LAD  territory. An additional focus of septal delayed enhancement extending to  the subendocardium on the RV side of the septum may represent an infarct  rather than nonischemic type delayed enhancement. Within this focus of  delayed enhancement is a tiny, linear hypoattenuating focus which is  nonenhancing and hypointense on the long TI imaging, may represent a small  area of microvascular occlusion/no reflow rather than thin laminar  thrombus.     2.   Mildly decreased left ventricular systolic function. Wall motion  abnormalities corresponding to the territories of the infarct. Subtle  resting perfusion defect suggested along the septum. More pronounced  resting perfusion defect on the RV side of the septum towards the base.        3.   Mild septal and anterior wall predominant left ventricular hypertrophy  up to 18 mm. Differential includes acquired hypertrophy versus hypertrophic  cardiomyopathy.  4.   Tiny left apical thrombi, seen on recent echo.     5.   Normal right ventricular systolic function..  6.   Partially imaged 15 mm adrenal nodule is indeterminate, nonemergent  follow-up with adrenal mass protocol CT or MRI recommended in the absence  of comparison imaging.     LVEF: 48%  RVEF: 57%  Background ECV: 24%                   ECHO 2/2/2024:  Result Text   Final Impressions    * Focused TTE: Ischemic Cardiomyopathy.    * Normal LV size. Regional wall motion abnormalities. Moderate  LV systolic  dysfunction, LVEF 40%.    * Apical akinesis. Small LV apical thrombus in images enhanced with  contrast.    * Normal right ventricular size and systolic function.    * No pericardial effusion.       Mid LM lesion with 30% stenosis.    Mid LAD-2 lesion with 30% stenosis.      SSO2 therapy in cath lab 2/1/24:     Successful SSO2 Therapy for 1 hour.  Successful SSO2 Therapy under the guidance of the rep Fam. The machine did not malfunction at all     Plan     Same as before. Will stop heparin. Let the ACT get ,180 and then pull the right femoral sheath. Tomorrow pull the balloon pump as well if doing well  Repeat limited echo tomorrow to see if LVEF recovered after SSO2  Amiodarone 200 mg po bid for 4 doses only due to the VF arrest, then use BB only.     LV apical thrombus. Will keep the heparin drip going till tomorrow AM 7 AM. Then stop the drip. Wait till ACT drops below 180 and pull the right femoral sheath. then restart the heparin5 hours after sheath pull without bolus. I think that the balloon pump will be better off removed on Saturday AM. His LV took a big hit and it will be good to support it with the balloon pump for 48 hours. Repeat the echo tomorrow.   Once balloon pump has been pulled, then 6 hours after that start heparin without bolus and bridge to coumadin and go home on coumadin for 3 months. Will need aspirin, Brilinta and coumadin for 1 month and then coumadin and Brilinta for 2 months and then aspirin and brilinta, if LV apical thrombus resolves and LVEF recovers.     Cath 2/1/2024:  The ejection fraction is estimated to be 39%.    Mid LM lesion with 30% stenosis.    Mid LAD lesion with 100% stenosis.     NSTEMI on presentation which evolved into Anterior STEMI while in [facility] ER with VF arrest. Door to balloon time of 79 minutes from [facility] to [facility] which is within the guidelines of 120 minutes. (1316 hours ECG developed ST elevation at [facility],  he reached [facility] cath lab at 1404 hours and balloon inflated at 1435 hours at [facility] cath lab)  Successful LAD PCI with 3.5 by 38 Synergy XD YURIY. We first placed a balloon pump via the left femoral artery with a very short door to support time, and that stabilized the hemodynamics. Then we accessed the left main with XB 3.5, crossed the lesion with a Whisper wire which needed the support of a 2.5 by 15 balloon. The lesion was tougher to cross consistent with his history of chest pain going on for 16 hours. Then IVUS was performed. The distal LAD went into spasm that easily resolved with one dose of IC NTG. There was a 40% stenosis distal to the stent. We discussed it with Dr Lee who also felt that he does not another stent distally. SSO2 was not available (broken), otherwise would have provided it. Modifier 22 due to complex and long PCI, hemodynamic instability needing IABP and IV Dopamine.     LVEF 39% with apical hypokinesia.      Plan     Aggressive goal directed medical therapy of coronary disease will be pursued as follows     Life long Aspirin at 81 mg a day. (RUSH II).  2. Beta blockers (NIYA).  3. Plavix (CAPRIE trial).  4. Brilinta to replace Plavix (PLATO trial and DAPT in view of 2016 ACC guidelines update)  5. High dose statin therapy (PROVEIT, REVERSAL trials).  6. ACE inhibitors (HOPE trial).  7. Integrilin for 10 hours.  8. Stop Dopamine. Remove balloon pump tomorrow AM.          ECHO 2/1/2024:  Final Impressions    * Normal left ventricular chamber size. Moderately reduced left ventricular  systolic function with ejection fraction of 32 %.    * There is a filling defect seen in the LV apex on contrast enhanced images  (series 0-76)- thrombus.    * Normal right ventricular size. Mildly reduced RV systolic function. RV  apical akinesis.    * Normal RV longitudinal function. S'= 11.7 cm/sec.    * Normal right ventricular systolic pressure 26 mmHg.    * No pericardial effusion.    * No prior study  available for comparison.    * Results were conveyed to the referring provider- Dr. Bermeo.        Discharge Medications:     Summary of your Discharge Medications        Take these Medications        Details   apixaBAN 5 MG Tab  Commonly known as: ELIQUIS   Take 1 tablet by mouth every 12 hours.     aspirin 81 MG chewable tablet  Start taking on: February 7, 2024   Chew 1 tablet by mouth daily. Do not start before February 7, 2024.     atorvastatin 80 MG tablet  Commonly known as: LIPITOR   Take 1 tablet by mouth nightly.     INV - EVOLocumab 140 MG/ML [employer] subcutaneous injection  Commonly known as: EVOLVE-MI   Inject 1 mL into the skin as directed.     metoPROLOL succinate 25 MG 24 hr tablet  Commonly known as: TOPROL-XL  Start taking on: February 7, 2024   Take 1 tablet by mouth daily. Do not start before February 7, 2024.     ticagrelor 90 MG Tab  Commonly known as: BRILINTA   Take 1 tablet by mouth every 12 hours.            ASK your doctor about these medications        Details   albuterol 108 (90 Base) MCG/ACT inhaler  Ask about: Which instructions should I use?   Inhale 2 puffs into the lungs every 4 hours as needed for Shortness of Breath.                  Discharge Labs:  Recent Labs   Lab 02/06/24  0457 02/05/24  0404 02/04/24  0718 02/04/24  0717 02/02/24  2153 02/02/24  0351 02/01/24  0951 02/01/24  0945   SODIUM 137 136  --   --   --  140   < > 141   POTASSIUM 4.2 4.0  --  4.1   < > 3.6   < > 4.1   CHLORIDE 109 109  --   --   --  114*   < > 112*   CO2 23 24  --   --   --  22   < > 26   BUN 16 15  --   --   --  10   < > 12   CREATININE 0.97 1.04  --   --   --  0.81   < > 0.85   GLUCOSE 113* 119*  --   --   --  128*   < > 112*   WBC 9.0 10.0 12.3*  --    < > 13.2*   < > 10.4   HGB 14.9 13.9 14.5  --    < > 12.6*   < > 13.2   HCT 43.9 42.2 42.0  --    < > 36.0*   < > 38.3*    170 159  --    < > 156   < > 158   PT  --   --   --   --   --   --   --  10.7   INR  --   --   --   --   --    --   --  1.0   PTT 31 29  --  48*   < > 55*  --  27    < > = values in this interval not displayed.       Significant Diagnostic Studies and Procedures:      MRI CARDIAC W WO CONTRAST WITH VELOCITY FLOW MAPPING   Final Result by Maxime Guallpa MD (02/06 0904)   IMPRESSION:      1.   Findings consistent with recent LAD occlusion status post   revascularization with transmural delayed enhancement throughout the   anterior left ventricle wall as well as the mid/apical septum and apical   inferior wall. Additionally, marked myocardial edema throughout the LAD   territory. An additional focus of septal delayed enhancement extending to   the subendocardium on the RV side of the septum may represent an infarct   rather than nonischemic type delayed enhancement. Within this focus of   delayed enhancement is a tiny, linear hypoattenuating focus which is   nonenhancing and hypointense on the long TI imaging, may represent a small   area of microvascular occlusion/no reflow rather than thin laminar   thrombus.      2.   Mildly decreased left ventricular systolic function. Wall motion   abnormalities corresponding to the territories of the infarct. Subtle   resting perfusion defect suggested along the septum. More pronounced   resting perfusion defect on the RV side of the septum towards the base.         3.   Mild septal and anterior wall predominant left ventricular hypertrophy   up to 18 mm. Differential includes acquired hypertrophy versus hypertrophic   cardiomyopathy.   4.   Tiny left apical thrombi, seen on recent echo.      5.   Normal right ventricular systolic function..   6.   Partially imaged 15 mm adrenal nodule is indeterminate, nonemergent   follow-up with adrenal mass protocol CT or MRI recommended in the absence   of comparison imaging.      LVEF: 48%   RVEF: 57%   Background ECV: 24%      I, Attending Radiologist Maxime Guallpa MD, have reviewed the images and   report and concur with these findings  interpreted by Resident Radiologist,   Jorge Luis Mesa MD.      Electronically Signed by: Maxime Guallpa MD   Signed on: 2/6/2024 9:04 AM   Created on Workstation ID: [id number]   Signed on Workstation ID: [id number]      Cath/PV Case   Final Result by Joseph Bermeo MD (02/01 2038)      XR CHEST AP OR PA   Final Result by John Mahmood MD (02/01 1649)   IMPRESSION:       1.   Support devices as above.   2.   Mild bilateral perihilar and bibasilar stranding opacification likely   representing atelectasis.      I, Attending Radiologist John Mahmood MD, have reviewed the images and   report and concur with these findings interpreted by Resident Radiologist,   Rohan Ware MD.      Electronically Signed by: John Mahmood MD   Signed on: 2/1/2024 4:49 PM   Created on Workstation ID: [id number]   Signed on Workstation ID: [id number]      Cath/PV Case   Final Result by Joseph Bermeo MD (02/01 1530)          Please review complete radiology reports in the imaging section for EPIC)       Discharge Exam:  Blood pressure 128/77, pulse 68, temperature 98.7 °F (37.1 °C), temperature source Oral, resp. rate 18, height 6' (1.829 m), weight 92.9 kg (204 lb 12.9 oz), SpO2 98 %.  General appearance - alert and in no distress  Neck - supple  and no adenopathy  Lungs - clear to auscultation  Heart - normal, regular rate and rhythm, no murmur noted  Abd - soft and non-tender. Non-distended. BS audible  Ext - no edema    Disposition: Home    Patient Instructions:     Activity: As tolerated    Diet: Low cholesterol diet    Code status: Full Resuscitation    Follow-up with PCP in 1-2 weeks          Signed:  RENAY Atkinson  2/6/2024  10:36 AM      Eddi Loya MD        Normal rate, regular rhythm.  Heart sounds S1, S2.  No murmurs, rubs or gallops.

## 2024-02-07 ENCOUNTER — APPOINTMENT (OUTPATIENT)
Dept: INTERNAL MEDICINE | Facility: CLINIC | Age: 64
End: 2024-02-07
Payer: MEDICARE

## 2024-02-07 VITALS
SYSTOLIC BLOOD PRESSURE: 132 MMHG | OXYGEN SATURATION: 99 % | BODY MASS INDEX: 23.74 KG/M2 | HEART RATE: 91 BPM | WEIGHT: 134 LBS | HEIGHT: 63 IN | TEMPERATURE: 98 F | DIASTOLIC BLOOD PRESSURE: 82 MMHG

## 2024-02-07 DIAGNOSIS — R41.3 OTHER AMNESIA: ICD-10-CM

## 2024-02-07 PROCEDURE — 99214 OFFICE O/P EST MOD 30 MIN: CPT

## 2024-02-12 ENCOUNTER — APPOINTMENT (OUTPATIENT)
Dept: ENDOCRINOLOGY | Facility: CLINIC | Age: 64
End: 2024-02-12
Payer: MEDICARE

## 2024-02-12 VITALS — SYSTOLIC BLOOD PRESSURE: 124 MMHG | HEART RATE: 75 BPM | OXYGEN SATURATION: 99 % | DIASTOLIC BLOOD PRESSURE: 88 MMHG

## 2024-02-12 VITALS — WEIGHT: 130 LBS | BODY MASS INDEX: 23.62 KG/M2 | HEIGHT: 62.2 IN

## 2024-02-12 DIAGNOSIS — Z13.820 ENCOUNTER FOR SCREENING FOR OSTEOPOROSIS: ICD-10-CM

## 2024-02-12 PROCEDURE — ZZZZZ: CPT

## 2024-02-12 PROCEDURE — 77085 DXA BONE DENSITY AXL VRT FX: CPT

## 2024-02-12 PROCEDURE — 99214 OFFICE O/P EST MOD 30 MIN: CPT

## 2024-02-12 PROCEDURE — G2211 COMPLEX E/M VISIT ADD ON: CPT

## 2024-02-12 RX ORDER — SEMAGLUTIDE 2.68 MG/ML
8 INJECTION, SOLUTION SUBCUTANEOUS
Qty: 3 | Refills: 1 | Status: ACTIVE | COMMUNITY
Start: 2022-02-07 | End: 1900-01-01

## 2024-02-12 RX ORDER — LANCETS 33 GAUGE
EACH MISCELLANEOUS
Qty: 400 | Refills: 0 | Status: DISCONTINUED | COMMUNITY
Start: 2022-06-22 | End: 2024-02-12

## 2024-02-12 RX ORDER — LANCETS 33 GAUGE
EACH MISCELLANEOUS
Qty: 400 | Refills: 4 | Status: DISCONTINUED | COMMUNITY
Start: 2022-06-27 | End: 2024-02-12

## 2024-02-12 RX ORDER — INSULIN LISPRO 100 [IU]/ML
100 INJECTION, SOLUTION INTRAVENOUS; SUBCUTANEOUS
Qty: 3 | Refills: 3 | Status: DISCONTINUED | COMMUNITY
Start: 2021-07-19 | End: 2024-02-12

## 2024-02-12 RX ORDER — BLOOD SUGAR DIAGNOSTIC
STRIP MISCELLANEOUS
Qty: 400 | Refills: 1 | Status: DISCONTINUED | COMMUNITY
Start: 2022-06-27 | End: 2024-02-12

## 2024-02-12 RX ORDER — LANCETS 30 GAUGE
EACH MISCELLANEOUS
Qty: 2 | Refills: 5 | Status: DISCONTINUED | COMMUNITY
Start: 2022-05-17 | End: 2024-02-12

## 2024-02-12 RX ORDER — CLOTRIMAZOLE AND BETAMETHASONE DIPROPIONATE 10; .5 MG/G; MG/G
1-0.05 CREAM TOPICAL
Qty: 15 | Refills: 17 | Status: COMPLETED | COMMUNITY
Start: 2022-05-31 | End: 2024-02-12

## 2024-02-12 RX ORDER — FLASH GLUCOSE SENSOR
KIT MISCELLANEOUS
Qty: 6 | Refills: 3 | Status: ACTIVE | COMMUNITY
Start: 2022-09-30 | End: 1900-01-01

## 2024-02-12 RX ORDER — ERGOCALCIFEROL 1.25 MG/1
1.25 MG CAPSULE ORAL
Qty: 12 | Refills: 0 | Status: DISCONTINUED | COMMUNITY
Start: 2023-07-20 | End: 2024-02-12

## 2024-02-12 RX ORDER — LANCETS 33 GAUGE
EACH MISCELLANEOUS
Refills: 0 | Status: DISCONTINUED | COMMUNITY
End: 2024-02-12

## 2024-02-14 NOTE — RESULTS/DATA
[FreeTextEntry1] : Bone density L1-L4: BMD 0.993, T-score -0.9, Z-score 1.3 Total hip BMD 0.818, T-score -1.0, Z score 0.2 Femoral neck: BMD 0.742, T-score -1.0, Z score 0.5 33% radius: BMD 0.717, T-score -0.4, Z-score -2.0   Within normal limits, repeat in 3 years.

## 2024-02-14 NOTE — QUALITY MEASURES
[Visual inspection, sensory exam] : Foot exam, including visual inspection, sensory exam with mono filament, and pulse exam, was performed within the last 12 months [TextEntry] : Ophthalmology follow up: Need to re-establish care with eye doctor Podiatry follow up and foot exam: Foot exam 02/12/2024 within normal limits.

## 2024-02-14 NOTE — ASSESSMENT
[FreeTextEntry1] : Patient is a 63 yo woman with uncontrolled type 2 diabetes, HTN, HLD, thyroid nodules here for endocrine care.   1. Uncontrolled Type 2 DM A1c 6.1% 2/12/2024 Patient is currently only taking Ozempic 2.0 mg once weekly which she is tolerating well. Not able to tolerate metformin in the past.  For postprandial hyperglycemia, add on LDSS Take additional with each meals.  Patient to check glucose before each meals.  She will benefit from CGM monitoring. 1 Unit(s) if Glucose 151 - 200 2 Unit(s) if Glucose 201 - 250 3 Unit(s) if Glucose 251 - 300 4 Unit(s) if Glucose 301 - 350 5 Unit(s) if Glucose 351 - 400 6 Unit(s) if Glucose GREATER THAN 400 NOTIFY Provider for blood glucose LESS THAN 70 milliGRAM(s)/deciLiter or GREATER THAN 400 milliGRAM(s)/deciliter. Reports feeling a little bit weak but otherwise she is doing well. Continue with her current regimen. She is up-to-date with ophthalmologist foot exam done today within normal limits July 2023  2.Thyroid nodules Last ultrasound was done in July 2022 Subcentimeter spongiform right thyroid lobe nodules again appreciated as described above, without significant interval change.  Can monitor every 5 years   3.  Hypertension BP today 134/90 BP target <130/80 Continue with losartan 25 mg once daily  4.  Hyperlipidemia  LDL goal less than 70 mg/dL On atorvastatin 20mg once daily, this was recently started by her primary care doctor.  FU with ACP in 3 moths FU in 6 months with me

## 2024-02-14 NOTE — HISTORY OF PRESENT ILLNESS
[FreeTextEntry1] : Patient is a 64-year-old man with history of type 2 diabetes, sarcoidosis, here for endocrinology follow-up. For type 2 diabetes, diagnosed in 2017.  Complicated by diabetic neuropathy.  Currently taking Ozempic 2.0 mg once weekly.  But due to difficulty obtaining the medication, has been doing Ozempic 1.0 mg x2 doses each week. Denies any history of diabetic retinopathy, or nephropathy. Denies any history of CAD, CHF or CVA in the past.  Regarding hyperlipidemia, patient is currently on atorvastatin 10 mg once daily.  Denies any side effects.  Regarding hypertension, patient is on losartan 25 mg once daily.  Patient has a history of thyroid nodule, seen on ultrasound.  Last done in July 2022.  Subcentimeter.  No family history of thyroid cancer.

## 2024-02-16 LAB
25(OH)D3 SERPL-MCNC: 15.1 NG/ML
CREAT SPEC-SCNC: 117 MG/DL
ESTIMATED AVERAGE GLUCOSE: 128 MG/DL
HBA1C MFR BLD HPLC: 6.1 %
MICROALBUMIN 24H UR DL<=1MG/L-MCNC: <1.2 MG/DL
MICROALBUMIN/CREAT 24H UR-RTO: NORMAL MG/G

## 2024-02-16 RX ORDER — ERGOCALCIFEROL 1.25 MG/1
1.25 MG CAPSULE ORAL
Qty: 12 | Refills: 0 | Status: COMPLETED | COMMUNITY
Start: 2024-02-16 | End: 2024-05-10

## 2024-02-21 ENCOUNTER — APPOINTMENT (OUTPATIENT)
Dept: ULTRASOUND IMAGING | Facility: IMAGING CENTER | Age: 64
End: 2024-02-21
Payer: MEDICARE

## 2024-02-21 ENCOUNTER — RESULT REVIEW (OUTPATIENT)
Age: 64
End: 2024-02-21

## 2024-02-21 ENCOUNTER — OUTPATIENT (OUTPATIENT)
Dept: OUTPATIENT SERVICES | Facility: HOSPITAL | Age: 64
LOS: 1 days | End: 2024-02-21
Payer: COMMERCIAL

## 2024-02-21 DIAGNOSIS — Z98.890 OTHER SPECIFIED POSTPROCEDURAL STATES: Chronic | ICD-10-CM

## 2024-02-21 DIAGNOSIS — Z00.8 ENCOUNTER FOR OTHER GENERAL EXAMINATION: ICD-10-CM

## 2024-02-21 DIAGNOSIS — Z41.9 ENCOUNTER FOR PROCEDURE FOR PURPOSES OTHER THAN REMEDYING HEALTH STATE, UNSPECIFIED: Chronic | ICD-10-CM

## 2024-02-21 PROCEDURE — 88312 SPECIAL STAINS GROUP 1: CPT | Mod: 26

## 2024-02-21 PROCEDURE — 88173 CYTOPATH EVAL FNA REPORT: CPT

## 2024-02-21 PROCEDURE — 38505 NEEDLE BIOPSY LYMPH NODES: CPT

## 2024-02-21 PROCEDURE — 76942 ECHO GUIDE FOR BIOPSY: CPT | Mod: 26

## 2024-02-21 PROCEDURE — 88312 SPECIAL STAINS GROUP 1: CPT

## 2024-02-21 PROCEDURE — 38505 NEEDLE BIOPSY LYMPH NODES: CPT | Mod: LT

## 2024-02-21 PROCEDURE — 76942 ECHO GUIDE FOR BIOPSY: CPT

## 2024-02-21 PROCEDURE — 88172 CYTP DX EVAL FNA 1ST EA SITE: CPT

## 2024-02-21 PROCEDURE — 88173 CYTOPATH EVAL FNA REPORT: CPT | Mod: 26

## 2024-02-21 PROCEDURE — 88305 TISSUE EXAM BY PATHOLOGIST: CPT | Mod: 26

## 2024-02-21 PROCEDURE — 88305 TISSUE EXAM BY PATHOLOGIST: CPT

## 2024-02-26 LAB — NON-GYNECOLOGICAL CYTOLOGY STUDY: SIGNIFICANT CHANGE UP

## 2024-03-07 NOTE — PHYSICAL EXAM
[No Acute Distress] : no acute distress [Well Nourished] : well nourished [Well Developed] : well developed [Well-Appearing] : well-appearing [Normal Sclera/Conjunctiva] : normal sclera/conjunctiva [PERRL] : pupils equal round and reactive to light [EOMI] : extraocular movements intact [Normal Outer Ear/Nose] : the outer ears and nose were normal in appearance [No JVD] : no jugular venous distention [Normal Oropharynx] : the oropharynx was normal [No Lymphadenopathy] : no lymphadenopathy [Supple] : supple [Thyroid Normal, No Nodules] : the thyroid was normal and there were no nodules present [No Accessory Muscle Use] : no accessory muscle use [No Respiratory Distress] : no respiratory distress  [Clear to Auscultation] : lungs were clear to auscultation bilaterally [Normal Rate] : normal rate  [Regular Rhythm] : with a regular rhythm [Normal S1, S2] : normal S1 and S2 [No Abdominal Bruit] : a ~M bruit was not heard ~T in the abdomen [No Carotid Bruits] : no carotid bruits [No Varicosities] : no varicosities [Pedal Pulses Present] : the pedal pulses are present [No Edema] : there was no peripheral edema [No Palpable Aorta] : no palpable aorta [Soft] : abdomen soft [No Extremity Clubbing/Cyanosis] : no extremity clubbing/cyanosis [Non Tender] : non-tender [Non-distended] : non-distended [No Masses] : no abdominal mass palpated [Normal Bowel Sounds] : normal bowel sounds [No HSM] : no HSM [Normal Anterior Cervical Nodes] : no anterior cervical lymphadenopathy [No CVA Tenderness] : no CVA  tenderness [Normal Posterior Cervical Nodes] : no posterior cervical lymphadenopathy [No Spinal Tenderness] : no spinal tenderness [No Joint Swelling] : no joint swelling [Grossly Normal Strength/Tone] : grossly normal strength/tone [No Rash] : no rash [Coordination Grossly Intact] : coordination grossly intact [No Focal Deficits] : no focal deficits [Normal Gait] : normal gait [Normal Insight/Judgement] : insight and judgment were intact [Normal Affect] : the affect was normal

## 2024-03-07 NOTE — HISTORY OF PRESENT ILLNESS
[FreeTextEntry1] : Follow up [de-identified] : Ms. POWELL is a 63 year old female who presents to the office for follow up: PMHx: CAD, GERD, HLD, HTN, thyroid nodules, DMII  Recent screening mammogram: New grouping calcifications in the posterior upper outer right breast Diagnostic mammogram: New benign-appearing calcifications right breast, likely within an involuting fibroadenoma  F/up mammogram in 6 months - July No sonographic evidence of malignancy in either breast  Xray C-spine: Grade 1 anterolisthesis of C7 on T1 and T1 on T2, overall moderate discogenic cervical spondylosis  S/P PET scan - w/ LAD, LN biopsy pending: Multiple FDG avid nodes above and below the diaphragm; new hepatic, splenic and osseous lesions. biopsy of L supraclavicular LN can be accessed via US - pt to schedule w/ radiology - On Feb 27th  Advised ortho evaluation vs neurosurgery  Neuro for memory loss  Neurosurgery: [address] [facility] [address], [place] [address]  [phone]  Upcoming endo appointment - needs glucose sensor 132/82 134lb

## 2024-03-12 ENCOUNTER — APPOINTMENT (OUTPATIENT)
Dept: PULMONOLOGY | Facility: CLINIC | Age: 64
End: 2024-03-12

## 2024-03-20 ENCOUNTER — APPOINTMENT (OUTPATIENT)
Dept: RHEUMATOLOGY | Facility: CLINIC | Age: 64
End: 2024-03-20
Payer: MEDICARE

## 2024-03-20 VITALS
DIASTOLIC BLOOD PRESSURE: 85 MMHG | SYSTOLIC BLOOD PRESSURE: 131 MMHG | WEIGHT: 132.25 LBS | BODY MASS INDEX: 24.34 KG/M2 | RESPIRATION RATE: 16 BRPM | OXYGEN SATURATION: 98 % | HEART RATE: 103 BPM | HEIGHT: 62 IN

## 2024-03-20 DIAGNOSIS — R76.8 OTHER SPECIFIED ABNORMAL IMMUNOLOGICAL FINDINGS IN SERUM: ICD-10-CM

## 2024-03-20 PROCEDURE — 99214 OFFICE O/P EST MOD 30 MIN: CPT

## 2024-03-21 NOTE — ASSESSMENT
[FreeTextEntry1] : 1. Sarcoidosis  multiple areas of uptake on most recent PET/CT (cervical, supraclavicular, paratracheal, spleen, liveriliac LAD, bone lesions) previously treated with methotrexate with good response and tolerability.  Recommend resuming methotrexate at this time.  Patient is agreeable.  check labs and if wnl, start methotrexatee 2. Positive Hep B core Ab check PCR  GI evaluation recommended to consider HepB prophylaxis.   OV 6 weeks, sooner PRN

## 2024-03-21 NOTE — HISTORY OF PRESENT ILLNESS
[FreeTextEntry1] : source of history: patient, chart 64 yo woman with DM (controlled with Ozempic), diabetic neuropathy, thyroid nodules, Hep B core Ab positive  Sarcoidosis, diagnosed in 2019, when she presented with 30Ibs weight loss for 3 months.  She was found to have lymphadenopathy. 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 She was treated with steroids but then tapered off. She was then treated with methotrexate.  She has not had a f/u with rheumatologist since 2021 as her rheumatologist left the practice.   She now presents to establish care.  She was seen her endocrinologist for c/o ongoing weight loss, fatigue, and pain in the ribs.  These are similar symptoms with which she presented when she had originally diagnosed with sarcoidosis and was asked to re-establish care with rheumatology.  Since making this appointment she has been feeling better, however.  Although the weight loss is expected with Ozempic, ongoing weight loss was concerning.  She now c/o pain in the lower back. Pt denies fever, loss of bowel/bladder control, extremity weakness, or saddle anesthesia. She denies abdominal pain, nausea, vomiting, rashes, fevers, chills, cough or shortness of breath.  Malignancy screening: PAP and mammogram next months; last colonoscopy was 3 years.   PET-CT 2019: Diffuse lymphadenopathy in the mediastinum, bilateral hilar regions and periesophageal lymph nodes near the gastroesophageal junction. Most likely diagnosis is lymphoma, but biopsy is pending. Nonspecific activity in the distal pancreas with a focal area of somewhat greater activity at the pancreatic tail. No abnormalities are seen on recent CT scan of this area and the significance of this uptake is unclear. No abnormal thyroid uptake is seen.  Interval history ____________ Intermittent chest discomfort, gas pain  no abdominal pain  no nausea  weight is stable  s/p LN biopsy CW non-caseating granuloma; no evidence of malignancy  no joint pain or swelling  no rashes.  +dry mouth

## 2024-03-21 NOTE — PHYSICAL EXAM
[General Appearance - Alert] : alert [General Appearance - In No Acute Distress] : in no acute distress [Full Pulse] : the pedal pulses are present [Edema] : there was no peripheral edema [Abdomen Soft] : soft [Abdomen Tenderness] : non-tender [Nail Clubbing] : no clubbing  or cyanosis of the fingernails [Abnormal Walk] : normal gait [Motor Tone] : muscle strength and tone were normal [Musculoskeletal - Swelling] : no joint swelling seen [Oriented To Time, Place, And Person] : oriented to person, place, and time [Impaired Insight] : insight and judgment were intact [Affect] : the affect was normal

## 2024-03-25 ENCOUNTER — APPOINTMENT (OUTPATIENT)
Dept: SPINE | Facility: CLINIC | Age: 64
End: 2024-03-25
Payer: MEDICARE

## 2024-03-25 VITALS
BODY MASS INDEX: 23.74 KG/M2 | DIASTOLIC BLOOD PRESSURE: 85 MMHG | OXYGEN SATURATION: 98 % | HEART RATE: 78 BPM | HEIGHT: 62 IN | SYSTOLIC BLOOD PRESSURE: 122 MMHG | WEIGHT: 129 LBS

## 2024-03-25 DIAGNOSIS — M54.81 OCCIPITAL NEURALGIA: ICD-10-CM

## 2024-03-25 PROCEDURE — 99203 OFFICE O/P NEW LOW 30 MIN: CPT

## 2024-03-25 RX ORDER — FOLIC ACID 1 MG/1
1 TABLET ORAL DAILY
Qty: 90 | Refills: 1 | Status: ACTIVE | COMMUNITY
Start: 2024-03-25 | End: 1900-01-01

## 2024-03-25 NOTE — ASSESSMENT
[FreeTextEntry1] : Longstanding neck pain and stiffness with symptoms suggestive of right occipital neuralgia.  Will arrange for cervical flexion-extension x-rays and MRI of the cervical spine.  Will also refer her to pain management for treatment of right occipital neuralgia.

## 2024-03-25 NOTE — HISTORY OF PRESENT ILLNESS
[de-identified] : right sided neck pain radiating into her head and face and radiating down into her 3rd, 4th and 5th digits of her right hand for the past year. Her pain is exacerbated with turning her head up and down.  She also describes tenderness behind the right ear.  X-rays from several months ago showed degenerative disc disease at several levels with slight subluxations at C7-T1 and T1-2.

## 2024-03-25 NOTE — REASON FOR VISIT
[New Patient Visit] : a new patient visit [Referred By: _________] : Patient was referred by FUNMILAYO

## 2024-03-25 NOTE — CONSULT LETTER
[Dear  ___] : Dear  [unfilled], [Please see my note below.] : Please see my note below. [Consult Letter:] : I had the pleasure of evaluating your patient, [unfilled]. [Consult Closing:] : Thank you very much for allowing me to participate in the care of this patient.  If you have any questions, please do not hesitate to contact me. [Sincerely,] : Sincerely, [FreeTextEntry3] : Nj Quintana MD Chief of Neurosurgery [facility]

## 2024-03-25 NOTE — PHYSICAL EXAM
[General Appearance - In No Acute Distress] : in no acute distress [General Appearance - Alert] : alert [Oriented To Time, Place, And Person] : oriented to person, place, and time [Impaired Insight] : insight and judgment were intact [Affect] : the affect was normal [Motor Tone] : muscle tone was normal in all four extremities [Motor Strength] : muscle strength was normal in all four extremities [Sensation Tactile Decrease] : light touch was intact [Abnormal Walk] : normal gait [___] : absent on the right [1+] : Ankle jerk left 1+ [___] : absent on the left [Johnson] : Johnson's sign was not demonstrated

## 2024-03-27 LAB
ACE BLD-CCNC: 173 U/L
ALBUMIN SERPL ELPH-MCNC: 4.1 G/DL
ALP BLD-CCNC: 175 U/L
ALT SERPL-CCNC: 46 U/L
ANION GAP SERPL CALC-SCNC: 10 MMOL/L
AST SERPL-CCNC: 35 U/L
BASOPHILS # BLD AUTO: 0.03 K/UL
BASOPHILS NFR BLD AUTO: 0.6 %
BILIRUB SERPL-MCNC: 0.5 MG/DL
BUN SERPL-MCNC: 16 MG/DL
CALCIUM SERPL-MCNC: 10.8 MG/DL
CHLORIDE SERPL-SCNC: 102 MMOL/L
CO2 SERPL-SCNC: 27 MMOL/L
CREAT SERPL-MCNC: 0.77 MG/DL
CRP SERPL-MCNC: 5 MG/L
EGFR: 86 ML/MIN/1.73M2
EOSINOPHIL # BLD AUTO: 0.2 K/UL
EOSINOPHIL NFR BLD AUTO: 3.7 %
ERYTHROCYTE [SEDIMENTATION RATE] IN BLOOD BY WESTERGREN METHOD: 21 MM/HR
HBV CORE IGG+IGM SER QL: REACTIVE
HBV CORE IGM SER QL: NONREACTIVE
HBV SURFACE AB SERPL IA-ACNC: 447 MIU/ML
HBV SURFACE AG SER QL: NONREACTIVE
HCT VFR BLD CALC: 35.1 %
HEPB DNA PCR INT: NOT DETECTED
HEPB DNA PCR LOG: NOT DETECTED LOGIU/ML
HGB BLD-MCNC: 12.4 G/DL
IMM GRANULOCYTES NFR BLD AUTO: 0.2 %
LYMPHOCYTES # BLD AUTO: 1.52 K/UL
LYMPHOCYTES NFR BLD AUTO: 27.9 %
MAN DIFF?: NORMAL
MCHC RBC-ENTMCNC: 31.6 PG
MCHC RBC-ENTMCNC: 35.3 GM/DL
MCV RBC AUTO: 89.5 FL
MONOCYTES # BLD AUTO: 0.72 K/UL
MONOCYTES NFR BLD AUTO: 13.2 %
NEUTROPHILS # BLD AUTO: 2.97 K/UL
NEUTROPHILS NFR BLD AUTO: 54.4 %
PLATELET # BLD AUTO: 187 K/UL
POTASSIUM SERPL-SCNC: 4.5 MMOL/L
PROT SERPL-MCNC: 7 G/DL
RBC # BLD: 3.92 M/UL
RBC # FLD: 12.8 %
SODIUM SERPL-SCNC: 139 MMOL/L
WBC # FLD AUTO: 5.45 K/UL

## 2024-04-01 ENCOUNTER — APPOINTMENT (OUTPATIENT)
Dept: INTERNAL MEDICINE | Facility: CLINIC | Age: 64
End: 2024-04-01
Payer: MEDICARE

## 2024-04-01 VITALS
WEIGHT: 128 LBS | HEIGHT: 62 IN | SYSTOLIC BLOOD PRESSURE: 121 MMHG | BODY MASS INDEX: 23.55 KG/M2 | OXYGEN SATURATION: 99 % | HEART RATE: 80 BPM | TEMPERATURE: 97.6 F | DIASTOLIC BLOOD PRESSURE: 84 MMHG

## 2024-04-01 DIAGNOSIS — M54.2 CERVICALGIA: ICD-10-CM

## 2024-04-01 PROCEDURE — 99214 OFFICE O/P EST MOD 30 MIN: CPT

## 2024-04-01 NOTE — PHYSICAL EXAM
[No Acute Distress] : no acute distress [Well Nourished] : well nourished [Well Developed] : well developed [Well-Appearing] : well-appearing [Normal Sclera/Conjunctiva] : normal sclera/conjunctiva [PERRL] : pupils equal round and reactive to light [Normal Outer Ear/Nose] : the outer ears and nose were normal in appearance [EOMI] : extraocular movements intact [Normal Oropharynx] : the oropharynx was normal [No JVD] : no jugular venous distention [Supple] : supple [No Lymphadenopathy] : no lymphadenopathy [Thyroid Normal, No Nodules] : the thyroid was normal and there were no nodules present [No Accessory Muscle Use] : no accessory muscle use [No Respiratory Distress] : no respiratory distress  [Normal Rate] : normal rate  [Clear to Auscultation] : lungs were clear to auscultation bilaterally [Regular Rhythm] : with a regular rhythm [Normal S1, S2] : normal S1 and S2 [No Carotid Bruits] : no carotid bruits [No Abdominal Bruit] : a ~M bruit was not heard ~T in the abdomen [No Varicosities] : no varicosities [Pedal Pulses Present] : the pedal pulses are present [No Edema] : there was no peripheral edema [No Palpable Aorta] : no palpable aorta [No Extremity Clubbing/Cyanosis] : no extremity clubbing/cyanosis [Soft] : abdomen soft [Non Tender] : non-tender [Non-distended] : non-distended [No HSM] : no HSM [No Masses] : no abdominal mass palpated [Normal Bowel Sounds] : normal bowel sounds [Normal Posterior Cervical Nodes] : no posterior cervical lymphadenopathy [Normal Anterior Cervical Nodes] : no anterior cervical lymphadenopathy [No CVA Tenderness] : no CVA  tenderness [No Spinal Tenderness] : no spinal tenderness [No Joint Swelling] : no joint swelling [Grossly Normal Strength/Tone] : grossly normal strength/tone [Coordination Grossly Intact] : coordination grossly intact [No Rash] : no rash [No Focal Deficits] : no focal deficits [Normal Gait] : normal gait [Normal Insight/Judgement] : insight and judgment were intact [Normal Affect] : the affect was normal

## 2024-04-01 NOTE — HISTORY OF PRESENT ILLNESS
[FreeTextEntry1] : Follow up [de-identified] : Ms. POWELL is a 63 year old female who presents to the office for follow up:  Saw [facility] for right sided neck pain MRI planned for Friday Last A1C 6.1%, prior of 6.4 Taking Ozempic 2mg qweekly - tolerating well Has new glucose sensor  Plan for blood work after 2nd dose of MTX -started MTX once weekly- tolerated first dose well - not much change yet Following w/ rheumatology  s/p LN biopsy CW non-caseating granuloma; no evidence of malignancy  121/84 128lb

## 2024-04-05 ENCOUNTER — APPOINTMENT (OUTPATIENT)
Dept: MRI IMAGING | Facility: IMAGING CENTER | Age: 64
End: 2024-04-05
Payer: MEDICARE

## 2024-04-05 ENCOUNTER — APPOINTMENT (OUTPATIENT)
Dept: RADIOLOGY | Facility: IMAGING CENTER | Age: 64
End: 2024-04-05
Payer: MEDICARE

## 2024-04-05 ENCOUNTER — OUTPATIENT (OUTPATIENT)
Dept: OUTPATIENT SERVICES | Facility: HOSPITAL | Age: 64
LOS: 1 days | End: 2024-04-05
Payer: COMMERCIAL

## 2024-04-05 DIAGNOSIS — Z41.9 ENCOUNTER FOR PROCEDURE FOR PURPOSES OTHER THAN REMEDYING HEALTH STATE, UNSPECIFIED: Chronic | ICD-10-CM

## 2024-04-05 DIAGNOSIS — Z00.8 ENCOUNTER FOR OTHER GENERAL EXAMINATION: ICD-10-CM

## 2024-04-05 DIAGNOSIS — Z98.890 OTHER SPECIFIED POSTPROCEDURAL STATES: Chronic | ICD-10-CM

## 2024-04-05 DIAGNOSIS — M54.81 OCCIPITAL NEURALGIA: ICD-10-CM

## 2024-04-05 PROCEDURE — 72141 MRI NECK SPINE W/O DYE: CPT | Mod: 26

## 2024-04-05 PROCEDURE — 72040 X-RAY EXAM NECK SPINE 2-3 VW: CPT

## 2024-04-05 PROCEDURE — 72040 X-RAY EXAM NECK SPINE 2-3 VW: CPT | Mod: 26

## 2024-04-05 PROCEDURE — 72141 MRI NECK SPINE W/O DYE: CPT

## 2024-04-12 LAB
ACE BLD-CCNC: 177 U/L
ALBUMIN SERPL ELPH-MCNC: 4.4 G/DL
ALP BLD-CCNC: 168 U/L
ALT SERPL-CCNC: 34 U/L
ANION GAP SERPL CALC-SCNC: 13 MMOL/L
AST SERPL-CCNC: 26 U/L
BASOPHILS # BLD AUTO: 0.03 K/UL
BASOPHILS NFR BLD AUTO: 0.6 %
BILIRUB SERPL-MCNC: 0.5 MG/DL
BUN SERPL-MCNC: 20 MG/DL
CALCIUM SERPL-MCNC: 10.7 MG/DL
CHLORIDE SERPL-SCNC: 101 MMOL/L
CO2 SERPL-SCNC: 25 MMOL/L
CREAT SERPL-MCNC: 0.88 MG/DL
CRP SERPL-MCNC: 4 MG/L
EGFR: 73 ML/MIN/1.73M2
EOSINOPHIL # BLD AUTO: 0.22 K/UL
EOSINOPHIL NFR BLD AUTO: 4.3 %
ERYTHROCYTE [SEDIMENTATION RATE] IN BLOOD BY WESTERGREN METHOD: 36 MM/HR
HCT VFR BLD CALC: 37.4 %
HGB BLD-MCNC: 12.7 G/DL
IMM GRANULOCYTES NFR BLD AUTO: 0.4 %
LYMPHOCYTES # BLD AUTO: 1.58 K/UL
LYMPHOCYTES NFR BLD AUTO: 30.9 %
MAN DIFF?: NORMAL
MCHC RBC-ENTMCNC: 30.8 PG
MCHC RBC-ENTMCNC: 34 GM/DL
MCV RBC AUTO: 90.8 FL
MONOCYTES # BLD AUTO: 0.68 K/UL
MONOCYTES NFR BLD AUTO: 13.3 %
NEUTROPHILS # BLD AUTO: 2.59 K/UL
NEUTROPHILS NFR BLD AUTO: 50.5 %
PLATELET # BLD AUTO: 183 K/UL
POTASSIUM SERPL-SCNC: 4.7 MMOL/L
PROT SERPL-MCNC: 7.6 G/DL
RBC # BLD: 4.12 M/UL
RBC # FLD: 12.3 %
SODIUM SERPL-SCNC: 139 MMOL/L
WBC # FLD AUTO: 5.12 K/UL

## 2024-04-14 ENCOUNTER — NON-APPOINTMENT (OUTPATIENT)
Age: 64
End: 2024-04-14

## 2024-04-15 ENCOUNTER — APPOINTMENT (OUTPATIENT)
Dept: SPINE | Facility: CLINIC | Age: 64
End: 2024-04-15
Payer: MEDICARE

## 2024-04-15 VITALS
BODY MASS INDEX: 23.55 KG/M2 | SYSTOLIC BLOOD PRESSURE: 133 MMHG | OXYGEN SATURATION: 97 % | HEIGHT: 62 IN | DIASTOLIC BLOOD PRESSURE: 87 MMHG | WEIGHT: 128 LBS | HEART RATE: 84 BPM

## 2024-04-15 DIAGNOSIS — M54.12 RADICULOPATHY, CERVICAL REGION: ICD-10-CM

## 2024-04-15 PROCEDURE — 99213 OFFICE O/P EST LOW 20 MIN: CPT

## 2024-04-15 NOTE — DATA REVIEWED
[de-identified] : Cervical spine from [facility] on 4/5/2024 [de-identified] : Cervical spine from [facility] on 4/5/2024

## 2024-04-15 NOTE — PHYSICAL EXAM
[General Appearance - Alert] : alert [General Appearance - In No Acute Distress] : in no acute distress [Oriented To Time, Place, And Person] : oriented to person, place, and time [Impaired Insight] : insight and judgment were intact [Person] : oriented to person [Place] : oriented to place [Time] : oriented to time [Short Term Intact] : short term memory intact [Remote Intact] : remote memory intact [Registration Intact] : recent registration memory intact [Span Intact] : the attention span was normal [Concentration Intact] : normal concentrating ability [Visual Intact] : visual attention was ~T not ~L decreased [Fluency] : fluency intact [Comprehension] : comprehension intact [Reading] : reading intact [Current Events] : adequate knowledge of current events [Past History] : adequate knowledge of personal past history [Vocabulary] : adequate range of vocabulary [Cranial Nerves Trigeminal (V)] : facial sensation intact symmetrically [Cranial Nerves Facial (VII)] : face symmetrical [Cranial Nerves Vestibulocochlear (VIII)] : hearing was intact bilaterally [Motor Tone] : muscle tone was normal in all four extremities [Motor Strength] : muscle strength was normal in all four extremities [Sensation Tactile Decrease] : light touch was intact [Abnormal Walk] : normal gait [Balance] : balance was intact

## 2024-04-15 NOTE — REASON FOR VISIT
[Follow-Up: _____] : a [unfilled] follow-up visit [Other: _____] : [unfilled] [FreeTextEntry1] : Mr. Branch is a 63 year old female who presented on 3/25/2024 with c/o right sided neck pain radiating into her head and face and radiating down into her 3rd, 4th and 5th digits of her right hand for the past year. Her pain is exacerbated with turning her head up and down. She also describes tenderness behind the right ear. Cervical x-rays from several months ago showed degenerative disc disease at several levels with slight subluxations at C7-T1 and T1-2. She was referred to pain management for treatment of right occipital neuralgia.   Today she reports her symptoms are unchanged. Pain level is 7/10. She is using Tylenol as needed with no relief. She did not see pain management since they did not take her insurance. She is here to review cervical flexion-extension x-rays and MRI of the cervical spine.  Flexion-extension x-rays do not show any dynamic instability.  Cervical MRI scan shows foraminal stenosis somewhat worse on the right than the left at multiple levels.  There is no significant central stenosis or cord impingement.

## 2024-04-15 NOTE — ASSESSMENT
[FreeTextEntry1] : 63 year old female with longstanding neck pain and stiffness with symptoms suggestive of right occipital neuralgia.  Normal neurological exam. No neurosurgical intervention indicated at this time. Patient referred to pain management. She was provided with a list of providers for pain management. She will return if there is no improvement.

## 2024-05-01 ENCOUNTER — APPOINTMENT (OUTPATIENT)
Dept: ENDOCRINOLOGY | Facility: CLINIC | Age: 64
End: 2024-05-01
Payer: MEDICARE

## 2024-05-01 VITALS
HEIGHT: 62 IN | OXYGEN SATURATION: 98 % | BODY MASS INDEX: 24.29 KG/M2 | SYSTOLIC BLOOD PRESSURE: 108 MMHG | DIASTOLIC BLOOD PRESSURE: 72 MMHG | HEART RATE: 100 BPM | WEIGHT: 132 LBS

## 2024-05-01 DIAGNOSIS — E11.649 TYPE 2 DIABETES MELLITUS WITH HYPOGLYCEMIA W/OUT COMA: ICD-10-CM

## 2024-05-01 DIAGNOSIS — E78.5 HYPERLIPIDEMIA, UNSPECIFIED: ICD-10-CM

## 2024-05-01 DIAGNOSIS — E04.1 NONTOXIC SINGLE THYROID NODULE: ICD-10-CM

## 2024-05-01 DIAGNOSIS — E55.9 VITAMIN D DEFICIENCY, UNSPECIFIED: ICD-10-CM

## 2024-05-01 DIAGNOSIS — I10 ESSENTIAL (PRIMARY) HYPERTENSION: ICD-10-CM

## 2024-05-01 LAB
GLUCOSE BLDC GLUCOMTR-MCNC: 120
HBA1C MFR BLD HPLC: 6.5

## 2024-05-01 PROCEDURE — 83036 HEMOGLOBIN GLYCOSYLATED A1C: CPT | Mod: QW

## 2024-05-01 PROCEDURE — 82962 GLUCOSE BLOOD TEST: CPT

## 2024-05-01 PROCEDURE — 99214 OFFICE O/P EST MOD 30 MIN: CPT

## 2024-05-01 PROCEDURE — G2211 COMPLEX E/M VISIT ADD ON: CPT

## 2024-05-01 NOTE — ASSESSMENT
[FreeTextEntry1] : Patient is a 64 yo woman with uncontrolled type 2 diabetes, HTN, HLD, thyroid nodules here for endocrine care.   1. Uncontrolled Type 2 DM A1c 6.1% 2/12/2024 A1c 5/1/24: 6.5%, slight worsening, still acceptable.   Patient is currently only taking Ozempic 2.0 mg once weekly which she is tolerating well. Not able to tolerate metformin in the past.  For postprandial hyperglycemia, add on LDSS Take additional with each meals.  Patient to check glucose before each meals.  She will benefit from CGM monitoring. 1 Unit(s) if Glucose 151 - 200 2 Unit(s) if Glucose 201 - 250 3 Unit(s) if Glucose 251 - 300 4 Unit(s) if Glucose 301 - 350 5 Unit(s) if Glucose 351 - 400 6 Unit(s) if Glucose GREATER THAN 400 NOTIFY Provider for blood glucose LESS THAN 70 milliGRAM(s)/deciLiter or GREATER THAN 400 milliGRAM(s)/deciliter. Reports feeling a little bit weak but otherwise she is doing well. Continue with her current regimen. She is up-to-date with ophthalmologist foot exam done today within normal limits July 2023  2.Thyroid nodules Last ultrasound was done in July 2022 Subcentimeter spongiform right thyroid lobe nodules again appreciated as described above, without significant interval change.  Can monitor every 5 years.  3.  Hypertension BP today 108/72 mmHg BP target <130/80 Continue with losartan 25 mg once daily  4.  Hyperlipidemia  LDL goal less than 70 mg/dL On atorvastatin 10 mg once daily, this was recently started by her primary care doctor.  3 months Dr. Walton, 6 months Kristen ALDRIDGE

## 2024-05-01 NOTE — HISTORY OF PRESENT ILLNESS
[FreeTextEntry1] : Patient is a 63-year-old man with history of type 2 diabetes, sarcoidosis, here for endocrinology follow-up. For type 2 diabetes, diagnosed in 2017.  Complicated by diabetic neuropathy.  A1c 5/1/24: 6.5%  Following with pain management and neuro for cervical spine issues.  She is now taking Methotrexate regularly.   Currently taking Ozempic 2.0 mg once weekly. But due to difficulty obtaining the medication, has been doing Ozempic 1.0 mg x2 doses each week. Denies any history of diabetic retinopathy, or nephropathy. Denies any history of CAD, CHF or CVA in the past.  Regarding hyperlipidemia, patient is currently on atorvastatin 10 mg once daily.  Denies any side effects.  Regarding hypertension, patient is on losartan 25 mg once daily.  Patient has a history of thyroid nodule, seen on ultrasound.  Last done in July 2022.  Subcentimeter.  No family history of thyroid cancer.

## 2024-05-01 NOTE — PHYSICAL EXAM
[Alert] : alert [No Acute Distress] : no acute distress [No Respiratory Distress] : no respiratory distress [No Accessory Muscle Use] : no accessory muscle use [Clear to Auscultation] : lungs were clear to auscultation bilaterally [Normal PMI] : the apical impulse was normal [Normal S1, S2] : normal S1 and S2 [Normal Rate] : heart rate was normal

## 2024-05-03 ENCOUNTER — APPOINTMENT (OUTPATIENT)
Dept: RHEUMATOLOGY | Facility: CLINIC | Age: 64
End: 2024-05-03

## 2024-05-05 LAB
ALP BLD-CCNC: 137 IU/L
ALP BONE CFR SERPL: 28 %
ALP BONE SERPL-MCNC: 15 UG/L
ALP INTEST CFR SERPL: 4 %
ALP LIVER CFR SERPL: 68 %
GGT SERPL-CCNC: 84 U/L

## 2024-05-06 ENCOUNTER — RX RENEWAL (OUTPATIENT)
Age: 64
End: 2024-05-06

## 2024-05-06 RX ORDER — ERGOCALCIFEROL 1.25 MG/1
1.25 MG CAPSULE, LIQUID FILLED ORAL
Qty: 12 | Refills: 0 | Status: ACTIVE | COMMUNITY
Start: 2021-11-17 | End: 1900-01-01

## 2024-05-13 ENCOUNTER — APPOINTMENT (OUTPATIENT)
Dept: RHEUMATOLOGY | Facility: CLINIC | Age: 64
End: 2024-05-13
Payer: MEDICARE

## 2024-05-13 VITALS
BODY MASS INDEX: 24.48 KG/M2 | DIASTOLIC BLOOD PRESSURE: 84 MMHG | SYSTOLIC BLOOD PRESSURE: 134 MMHG | WEIGHT: 133 LBS | OXYGEN SATURATION: 98 % | HEIGHT: 62 IN | HEART RATE: 83 BPM

## 2024-05-13 DIAGNOSIS — E83.52 HYPERCALCEMIA: ICD-10-CM

## 2024-05-13 DIAGNOSIS — R59.1 GENERALIZED ENLARGED LYMPH NODES: ICD-10-CM

## 2024-05-13 DIAGNOSIS — Z79.899 OTHER LONG TERM (CURRENT) DRUG THERAPY: ICD-10-CM

## 2024-05-13 PROCEDURE — G2211 COMPLEX E/M VISIT ADD ON: CPT

## 2024-05-13 PROCEDURE — 99214 OFFICE O/P EST MOD 30 MIN: CPT

## 2024-05-15 PROBLEM — Z79.899 HIGH RISK MEDICATION USE: Status: ACTIVE | Noted: 2024-05-15

## 2024-05-15 PROBLEM — R59.1 LYMPHADENOPATHY: Status: ACTIVE | Noted: 2019-04-10

## 2024-05-15 PROBLEM — E83.52 HYPERCALCEMIA: Status: ACTIVE | Noted: 2024-05-15

## 2024-05-15 NOTE — ASSESSMENT
[FreeTextEntry1] : Discussed changing to sq dosing to avoid GI related side effects.  Pt notes she is tolerating 10 mg weekly orally.  She will be traveling over the next 6 weeks.  I advised to hold off on the switch and remain on oral 10 mg weekly.  Patient to f/u upon return.  Current immunosuppressive medications require blood work  to assess for toxicity (bone marrow toxicity, liver toxicity, kidney toxicity)  Will check laboratory tests to look for markers of disease activity and also to assess for medication toxicity.

## 2024-05-15 NOTE — HISTORY OF PRESENT ILLNESS
[FreeTextEntry1] : Prior history  62 yo woman with DM (controlled with Ozempic), diabetic neuropathy, thyroid nodules, Hep B core Ab positive  Sarcoidosis, diagnosed in 2019, when she presented with 30Ibs weight loss for 3 months.  She was found to have lymphadenopathy. 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 She was treated with steroids but then tapered off. She was then treated with methotrexate.  She has not had a f/u with rheumatologist since 2021 as her rheumatologist left the practice.   She now presents to establish care.  She was seen her endocrinologist for c/o ongoing weight loss, fatigue, and pain in the ribs.  These are similar symptoms with which she presented when she had originally diagnosed with sarcoidosis and was asked to re-establish care with rheumatology.  Since making this appointment she has been feeling better, however.  Although the weight loss is expected with Ozempic, ongoing weight loss was concerning.  She now c/o pain in the lower back. Pt denies fever, loss of bowel/bladder control, extremity weakness, or saddle anesthesia. She denies abdominal pain, nausea, vomiting, rashes, fevers, chills, cough or shortness of breath.  Malignancy screening: PAP and mammogram next months; last colonoscopy was 3 years.   PET-CT 2019: Diffuse lymphadenopathy in the mediastinum, bilateral hilar regions and periesophageal lymph nodes near the gastroesophageal junction. Most likely diagnosis is lymphoma, but biopsy is pending. Nonspecific activity in the distal pancreas with a focal area of somewhat greater activity at the pancreatic tail. No abnormalities are seen on recent CT scan of this area and the significance of this uptake is unclear. No abnormal thyroid uptake is seen.  Recent history:  s/p LN biopsy CW non-caseating granuloma; no evidence of malignancy   Pt started on methotrexate and was tolerating methotrexate 10 mg weekly.  Once methotrexate increased to 12.5 mg weekly, patient started experiencing diarrhea for 1-2 days after.  This occurred on 2 separate occasions.  Overall, while on methotrexate 10 mg weekly she felt ok and report improvement in abdominal pain.  no joint pain, swelling or stiffness at this time.

## 2024-05-20 LAB
ALBUMIN SERPL ELPH-MCNC: 4.1 G/DL
ALP BLD-CCNC: 150 U/L
ALT SERPL-CCNC: 24 U/L
ANION GAP SERPL CALC-SCNC: 13 MMOL/L
AST SERPL-CCNC: 21 U/L
BASOPHILS # BLD AUTO: 0.02 K/UL
BASOPHILS NFR BLD AUTO: 0.5 %
BILIRUB SERPL-MCNC: 0.4 MG/DL
BUN SERPL-MCNC: 18 MG/DL
CALCIUM SERPL-MCNC: 10 MG/DL
CHLORIDE SERPL-SCNC: 101 MMOL/L
CO2 SERPL-SCNC: 23 MMOL/L
CREAT SERPL-MCNC: 0.72 MG/DL
CRP SERPL-MCNC: 4 MG/L
EGFR: 93 ML/MIN/1.73M2
EOSINOPHIL # BLD AUTO: 0.21 K/UL
EOSINOPHIL NFR BLD AUTO: 5 %
ERYTHROCYTE [SEDIMENTATION RATE] IN BLOOD BY WESTERGREN METHOD: 17 MM/HR
HCT VFR BLD CALC: 37.2 %
HGB BLD-MCNC: 12 G/DL
IMM GRANULOCYTES NFR BLD AUTO: 0.7 %
LYMPHOCYTES # BLD AUTO: 1.16 K/UL
LYMPHOCYTES NFR BLD AUTO: 27.5 %
MAN DIFF?: NORMAL
MCHC RBC-ENTMCNC: 31.1 PG
MCHC RBC-ENTMCNC: 32.3 GM/DL
MCV RBC AUTO: 96.4 FL
MONOCYTES # BLD AUTO: 0.63 K/UL
MONOCYTES NFR BLD AUTO: 14.9 %
NEUTROPHILS # BLD AUTO: 2.17 K/UL
NEUTROPHILS NFR BLD AUTO: 51.4 %
PLATELET # BLD AUTO: 193 K/UL
POTASSIUM SERPL-SCNC: 4.4 MMOL/L
PROT SERPL-MCNC: 7.2 G/DL
RBC # BLD: 3.86 M/UL
RBC # FLD: 14.3 %
SODIUM SERPL-SCNC: 138 MMOL/L
WBC # FLD AUTO: 4.22 K/UL

## 2024-06-10 ENCOUNTER — RX RENEWAL (OUTPATIENT)
Age: 64
End: 2024-06-10

## 2024-06-10 RX ORDER — METHOTREXATE 2.5 MG/1
2.5 TABLET ORAL
Qty: 20 | Refills: 1 | Status: ACTIVE | COMMUNITY
Start: 2024-03-25 | End: 1900-01-01

## 2024-07-01 ENCOUNTER — NON-APPOINTMENT (OUTPATIENT)
Age: 64
End: 2024-07-01

## 2024-07-01 ENCOUNTER — APPOINTMENT (OUTPATIENT)
Dept: HEPATOLOGY | Facility: CLINIC | Age: 64
End: 2024-07-01
Payer: MEDICARE

## 2024-07-01 VITALS
SYSTOLIC BLOOD PRESSURE: 129 MMHG | BODY MASS INDEX: 24.66 KG/M2 | RESPIRATION RATE: 16 BRPM | WEIGHT: 134 LBS | TEMPERATURE: 98.1 F | HEART RATE: 82 BPM | DIASTOLIC BLOOD PRESSURE: 83 MMHG | OXYGEN SATURATION: 99 % | HEIGHT: 62 IN

## 2024-07-01 PROBLEM — D86.9 SARCOIDOSIS: Status: ACTIVE | Noted: 2023-12-14

## 2024-07-01 PROCEDURE — 99204 OFFICE O/P NEW MOD 45 MIN: CPT

## 2024-07-02 LAB
ALBUMIN SERPL ELPH-MCNC: 4.3 G/DL
ALP BLD-CCNC: 148 U/L
ALT SERPL-CCNC: 32 U/L
ANION GAP SERPL CALC-SCNC: 10 MMOL/L
AST SERPL-CCNC: 26 U/L
BASOPHILS # BLD AUTO: 0.02 K/UL
BASOPHILS NFR BLD AUTO: 0.5 %
BILIRUB SERPL-MCNC: 0.5 MG/DL
BUN SERPL-MCNC: 16 MG/DL
CALCIUM SERPL-MCNC: 9.8 MG/DL
CHLORIDE SERPL-SCNC: 103 MMOL/L
CO2 SERPL-SCNC: 26 MMOL/L
CREAT SERPL-MCNC: 0.79 MG/DL
EGFR: 83 ML/MIN/1.73M2
EOSINOPHIL # BLD AUTO: 0.22 K/UL
EOSINOPHIL NFR BLD AUTO: 5.4 %
GLUCOSE SERPL-MCNC: 149 MG/DL
HCT VFR BLD CALC: 37.8 %
HGB BLD-MCNC: 12.7 G/DL
IMM GRANULOCYTES NFR BLD AUTO: 0.2 %
LYMPHOCYTES # BLD AUTO: 1.29 K/UL
LYMPHOCYTES NFR BLD AUTO: 31.6 %
MAN DIFF?: NORMAL
MCHC RBC-ENTMCNC: 32.2 PG
MCHC RBC-ENTMCNC: 33.6 GM/DL
MCV RBC AUTO: 95.9 FL
MONOCYTES # BLD AUTO: 0.55 K/UL
MONOCYTES NFR BLD AUTO: 13.5 %
NEUTROPHILS # BLD AUTO: 1.99 K/UL
NEUTROPHILS NFR BLD AUTO: 48.8 %
PLATELET # BLD AUTO: 185 K/UL
POTASSIUM SERPL-SCNC: 4.7 MMOL/L
PROT SERPL-MCNC: 7.3 G/DL
RBC # BLD: 3.94 M/UL
RBC # FLD: 13.5 %
SODIUM SERPL-SCNC: 138 MMOL/L
WBC # FLD AUTO: 4.08 K/UL

## 2024-07-03 ENCOUNTER — RX RENEWAL (OUTPATIENT)
Age: 64
End: 2024-07-03

## 2024-07-03 ENCOUNTER — APPOINTMENT (OUTPATIENT)
Dept: RHEUMATOLOGY | Facility: CLINIC | Age: 64
End: 2024-07-03
Payer: MEDICARE

## 2024-07-03 VITALS
DIASTOLIC BLOOD PRESSURE: 89 MMHG | HEIGHT: 62 IN | BODY MASS INDEX: 24.11 KG/M2 | WEIGHT: 131 LBS | OXYGEN SATURATION: 98 % | SYSTOLIC BLOOD PRESSURE: 143 MMHG | HEART RATE: 78 BPM

## 2024-07-03 DIAGNOSIS — K76.9 LIVER DISEASE, UNSPECIFIED: ICD-10-CM

## 2024-07-03 DIAGNOSIS — Z79.899 OTHER LONG TERM (CURRENT) DRUG THERAPY: ICD-10-CM

## 2024-07-03 PROCEDURE — 99214 OFFICE O/P EST MOD 30 MIN: CPT

## 2024-07-03 PROCEDURE — G2211 COMPLEX E/M VISIT ADD ON: CPT

## 2024-07-14 PROBLEM — K76.9 HEPATIC LESION: Status: ACTIVE | Noted: 2024-07-14

## 2024-07-16 ENCOUNTER — APPOINTMENT (OUTPATIENT)
Dept: NEUROLOGY | Facility: CLINIC | Age: 64
End: 2024-07-16
Payer: MEDICARE

## 2024-07-16 VITALS
HEART RATE: 83 BPM | SYSTOLIC BLOOD PRESSURE: 113 MMHG | DIASTOLIC BLOOD PRESSURE: 78 MMHG | BODY MASS INDEX: 24.29 KG/M2 | WEIGHT: 132 LBS | HEIGHT: 62 IN

## 2024-07-16 DIAGNOSIS — R41.3 OTHER AMNESIA: ICD-10-CM

## 2024-07-16 PROCEDURE — 99205 OFFICE O/P NEW HI 60 MIN: CPT

## 2024-07-16 PROCEDURE — G2211 COMPLEX E/M VISIT ADD ON: CPT | Mod: NC

## 2024-07-17 LAB
25(OH)D3 SERPL-MCNC: 18.2 NG/ML
CRP SERPL-MCNC: 6 MG/L
ERYTHROCYTE [SEDIMENTATION RATE] IN BLOOD BY WESTERGREN METHOD: 7 MM/HR
FOLATE SERPL-MCNC: 12.9 NG/ML
T4 SERPL-MCNC: 8 UG/DL
TSH SERPL-ACNC: 1.35 UIU/ML
VIT B12 SERPL-MCNC: 434 PG/ML

## 2024-07-18 DIAGNOSIS — D86.9 SARCOIDOSIS, UNSPECIFIED: ICD-10-CM

## 2024-07-18 LAB
ACE BLD-CCNC: 182 U/L
ALBUMIN SERPL ELPH-MCNC: 4.5 G/DL
ALP BLD-CCNC: 178 U/L
ALT SERPL-CCNC: 46 U/L
ANA SER IF-ACNC: NEGATIVE
ANION GAP SERPL CALC-SCNC: 11 MMOL/L
AST SERPL-CCNC: 30 U/L
BASOPHILS # BLD AUTO: 0.02 K/UL
BASOPHILS NFR BLD AUTO: 0.4 %
BILIRUB SERPL-MCNC: 0.4 MG/DL
BUN SERPL-MCNC: 20 MG/DL
CALCIUM SERPL-MCNC: 10.9 MG/DL
CHLORIDE SERPL-SCNC: 102 MMOL/L
CO2 SERPL-SCNC: 25 MMOL/L
CREAT SERPL-MCNC: 0.82 MG/DL
CRP SERPL-MCNC: 6 MG/L
EGFR: 79 ML/MIN/1.73M2
EOSINOPHIL # BLD AUTO: 0.21 K/UL
EOSINOPHIL NFR BLD AUTO: 4.2 %
ERYTHROCYTE [SEDIMENTATION RATE] IN BLOOD BY WESTERGREN METHOD: 13 MM/HR
HCT VFR BLD CALC: 40.6 %
HGB BLD-MCNC: 13.6 G/DL
IMM GRANULOCYTES NFR BLD AUTO: 0.4 %
LYMPHOCYTES # BLD AUTO: 1.51 K/UL
LYMPHOCYTES NFR BLD AUTO: 29.8 %
MAN DIFF?: NORMAL
MCHC RBC-ENTMCNC: 32.5 PG
MCHC RBC-ENTMCNC: 33.5 GM/DL
MCV RBC AUTO: 96.9 FL
MONOCYTES # BLD AUTO: 0.64 K/UL
MONOCYTES NFR BLD AUTO: 12.6 %
NEUTROPHILS # BLD AUTO: 2.66 K/UL
NEUTROPHILS NFR BLD AUTO: 52.6 %
PLATELET # BLD AUTO: 214 K/UL
POTASSIUM SERPL-SCNC: 5.2 MMOL/L
PROT SERPL-MCNC: 7.8 G/DL
RBC # BLD: 4.19 M/UL
RBC # FLD: 13.4 %
SODIUM SERPL-SCNC: 138 MMOL/L
T PALLIDUM AB SER QL IA: NEGATIVE
THYROGLOB AB SERPL-ACNC: <20 IU/ML
THYROPEROXIDASE AB SERPL IA-ACNC: 22 IU/ML
WBC # FLD AUTO: 5.06 K/UL

## 2024-07-22 LAB — VIT B1 SERPL-MCNC: 109.1 NMOL/L

## 2024-07-23 ENCOUNTER — APPOINTMENT (OUTPATIENT)
Dept: INTERNAL MEDICINE | Facility: CLINIC | Age: 64
End: 2024-07-23

## 2024-07-23 VITALS
WEIGHT: 134 LBS | OXYGEN SATURATION: 97 % | DIASTOLIC BLOOD PRESSURE: 74 MMHG | TEMPERATURE: 98.2 F | BODY MASS INDEX: 24.66 KG/M2 | HEIGHT: 62 IN | HEART RATE: 77 BPM | SYSTOLIC BLOOD PRESSURE: 118 MMHG

## 2024-07-23 DIAGNOSIS — M54.31 SCIATICA, RIGHT SIDE: ICD-10-CM

## 2024-07-23 PROCEDURE — 99214 OFFICE O/P EST MOD 30 MIN: CPT

## 2024-07-23 RX ORDER — VITAMIN K2 90 MCG
125 MCG CAPSULE ORAL
Qty: 90 | Refills: 1 | Status: ACTIVE | COMMUNITY
Start: 2024-07-23 | End: 1900-01-01

## 2024-07-23 RX ORDER — CYCLOBENZAPRINE HYDROCHLORIDE 5 MG/1
5 TABLET, FILM COATED ORAL
Qty: 20 | Refills: 0 | Status: ACTIVE | COMMUNITY
Start: 2024-07-23 | End: 1900-01-01

## 2024-07-23 NOTE — HISTORY OF PRESENT ILLNESS
[FreeTextEntry1] : Follow up, referrals, vitamin D level [de-identified] : Ms. POWELL is a 63 year old female who presents to the office for follow up:  Pt saw neuro recently - for memory loss: Pt reports sometimes forgets things like conversations that she has had or if she has eaten already.  She feels more irritable but no hallucinations.  She sometimes has difficult with directions/getting places Was referred to neuropsychiatry for further evaluation Has upcoming MRI Pt f/w rheumatology - has repeat testing upcoming C/O right eye floater(s) - will refer to ophthalmologist - referral provided  Reviewed colonoscopy in 2019 w/ patient - 10 year follow up Pt will be seeing pain management tomorrow Reports sciatica right side - start MR as needed, demonstrated stretches for patient  Recent vitamin D level low - 18.2 Will send for 5000IU tablets

## 2024-07-24 ENCOUNTER — RX RENEWAL (OUTPATIENT)
Age: 64
End: 2024-07-24

## 2024-07-24 ENCOUNTER — APPOINTMENT (OUTPATIENT)
Dept: PAIN MANAGEMENT | Facility: CLINIC | Age: 64
End: 2024-07-24
Payer: MEDICARE

## 2024-07-24 VITALS
WEIGHT: 134 LBS | HEART RATE: 82 BPM | DIASTOLIC BLOOD PRESSURE: 84 MMHG | HEIGHT: 62 IN | BODY MASS INDEX: 24.66 KG/M2 | SYSTOLIC BLOOD PRESSURE: 130 MMHG

## 2024-07-24 LAB
AMPA-R ABCBA: NEGATIVE
AMPHIPHYSIN IGG TITR SER IF: NEGATIVE
ANNOTATION COMMENT IMP: NORMAL
CASPR2-IGG CBA, S: NEGATIVE
CV2 IGG TITR SER: NEGATIVE
GABA-B ABCBA: NEGATIVE
GAD65 AB SER-MCNC: 0.01 NMOL/L
GFAP IFA, S: NEGATIVE
GLIAL NUC TYPE 1 AB TITR SER: NEGATIVE
HU1 AB TITR SER: NEGATIVE
HU2 AB TITR SER IF: NEGATIVE
HU3 AB TITR SER: NEGATIVE
IMMUNOLOGIST REVIEW: NORMAL
LGI1-IGG CBA, S: NEGATIVE
MGLUR1 AB IFA, S: NEGATIVE
NEUROCHONDRIN-IFA, SERUM: NEGATIVE
NIF IFA, S: NEGATIVE
NMDA-R ABCBA: NEGATIVE
PCA-1 AB TITR SER: NEGATIVE
PCA-2 AB TITR SER: NEGATIVE
PCA-TR AB TITR SER: NEGATIVE

## 2024-07-24 PROCEDURE — 99204 OFFICE O/P NEW MOD 45 MIN: CPT

## 2024-07-24 NOTE — HISTORY OF PRESENT ILLNESS
[FreeTextEntry1] : This is a case of a  63  -year-old right-handed female presents with a chief complaint of right occipital pain over the past few years, but she has notes it is getting worse. MRI c spine 3 months ago reported to be negative but was advised to see pain management.   Pain is constant worsening upon lying on the right side. The pain is described as an achy sensation. She also notes constant paresthesia involving the 3-5 digit of the right hand. If she touches the right side of her neck, the pain shoots to the right side of her head.    Patient reports nausea, vomiting, photophobia, phonophobia, aura.  Sleep hours per night   Caffeine intake/day - QOL: Triggers: None Comorbidities: None Other pain conditions: None Imaging: None Medications: None (Non pain) (Pain) Interventions: None CAM therapies: None SHX:  Work status: ETOH, tobacco cannabis PMH: PSH: Family history: Noncontributory Allergies: NKA

## 2024-07-24 NOTE — DATA REVIEWED
[FreeTextEntry1] : 	 EXAM: [id number] - MR SPINE CERVICAL  - ORDERED BY: JORGE MELTON   PROCEDURE DATE:  04/05/2024    INTERPRETATION:  MR CERVICAL SPINE  CLINICAL INFORMATION: h/o right sided neck pain; TECHNIQUE: MR CERVICAL SPINE COMPARISON: None available.  FINDINGS:  DISC LEVEL EVALUATION:  C1/C2: Normal. C2/C3: Right facet arthrosis moderately narrowing the right neuroforamen. C3/C4: Mild anterolisthesis with disc uncovering. Right facet arthrosis with effusion. Disc protrusion which mildly indents the ventral thecal sac. Mild right greater than left foraminal narrowing. C4/C5: Mild disc bulge with osseous ridging which mildly contacts the cord. Bulky right facet arthrosis. Moderate right greater than left foraminal narrowing. C5/C6: Diffuse disc bulge with osseous ridging which mildly contacts the cord. Severe right greater than left foraminal narrowing. C6/C7: Diffuse disc bulge with osseous ridging which mildly contacts the cord. Severe left greater than right foraminal narrowing. C7/T1: Bilateral facet arthrosis.  ALIGNMENT: Reversal of the normal cervical lordosis. Mild anterolisthesis of C3 on C4. Slight anterolisthesis of C4 on C5. CORD: No cord signal abnormality. MARROW: No fracture. Multilevel endplate degenerative changes. DISCS: Moderate multilevel degenerative changes with loss of disc heights and disc signal. IMAGED BRAIN: Normal. PERIPHERAL/NECK SOFT TISSUES: Normal.  IMPRESSION:  *  Moderate multilevel cervical spondylosis with multilevel disc osteophyte complex contacting the cord and varying foraminal narrowing. *  No cord signal abnormality.

## 2024-07-25 ENCOUNTER — APPOINTMENT (OUTPATIENT)
Dept: OPHTHALMOLOGY | Facility: CLINIC | Age: 64
End: 2024-07-25
Payer: MEDICARE

## 2024-07-25 PROCEDURE — 92004 COMPRE OPH EXAM NEW PT 1/>: CPT

## 2024-07-25 PROCEDURE — 92015 DETERMINE REFRACTIVE STATE: CPT | Mod: NC

## 2024-08-12 ENCOUNTER — APPOINTMENT (OUTPATIENT)
Dept: ENDOCRINOLOGY | Facility: CLINIC | Age: 64
End: 2024-08-12
Payer: MEDICARE

## 2024-08-12 ENCOUNTER — RX RENEWAL (OUTPATIENT)
Age: 64
End: 2024-08-12

## 2024-08-12 VITALS
BODY MASS INDEX: 24.48 KG/M2 | WEIGHT: 133 LBS | HEART RATE: 83 BPM | DIASTOLIC BLOOD PRESSURE: 84 MMHG | OXYGEN SATURATION: 98 % | HEIGHT: 62 IN | SYSTOLIC BLOOD PRESSURE: 118 MMHG

## 2024-08-12 DIAGNOSIS — I10 ESSENTIAL (PRIMARY) HYPERTENSION: ICD-10-CM

## 2024-08-12 DIAGNOSIS — E83.52 HYPERCALCEMIA: ICD-10-CM

## 2024-08-12 DIAGNOSIS — R07.0 PAIN IN THROAT: ICD-10-CM

## 2024-08-12 DIAGNOSIS — Z13.820 ENCOUNTER FOR SCREENING FOR OSTEOPOROSIS: ICD-10-CM

## 2024-08-12 DIAGNOSIS — E78.5 HYPERLIPIDEMIA, UNSPECIFIED: ICD-10-CM

## 2024-08-12 PROCEDURE — 99214 OFFICE O/P EST MOD 30 MIN: CPT

## 2024-08-12 PROCEDURE — G2211 COMPLEX E/M VISIT ADD ON: CPT

## 2024-08-12 RX ORDER — BENZOCAINE/MENTH/CETYLPYRD CL 15 MG-2 MG
10-2.1 LOZENGE MUCOUS MEMBRANE
Qty: 2 | Refills: 1 | Status: ACTIVE | COMMUNITY
Start: 2024-08-12 | End: 1900-01-01

## 2024-08-12 NOTE — ASSESSMENT
[FreeTextEntry1] : Patient is a 63 yo woman with uncontrolled type 2 diabetes, HTN, HLD, thyroid nodules here for endocrine care.   1. Uncontrolled Type 2 DM A1c 6.1% 2/12/2024 A1C 6.2% 08/12/2024 POCT Patient is currently only taking Ozempic 2.0 mg once weekly which she is tolerating well. Not able to tolerate metformin in the past.  For postprandial hyperglycemia, add on LDSS Take additional with each meals.  Patient to check glucose before each meals.  She will benefit from CGM monitoring. 1 Unit(s) if Glucose 151 - 200 2 Unit(s) if Glucose 201 - 250 3 Unit(s) if Glucose 251 - 300 4 Unit(s) if Glucose 301 - 350 5 Unit(s) if Glucose 351 - 400 6 Unit(s) if Glucose GREATER THAN 400 NOTIFY Provider for blood glucose LESS THAN 70 milliGRAM(s)/deciLiter or GREATER THAN 400 milliGRAM(s)/deciliter. Reports feeling a little bit weak but otherwise she is doing well. Continue with her current regimen. She is up-to-date with ophthalmologist foot exam done today within normal limits July 2023 Foot exam 08/12/2024 within normal  Eye exam: just recently.  She's seeing a spot in her right eye, she had a dilated exam   2.Thyroid nodules Last ultrasound was done in July 2022 Subcentimeter spongiform right thyroid lobe nodules again appreciated as described above, without significant interval change.  Can monitor every 3-5 years   3.  Hypertension BP target <130/80 Continue with losartan 25 mg once daily  4.  Hyperlipidemia  LDL goal less than 70 mg/dL Continue with statin therapy.  Atorvastatin once daily started by primary care doctor.

## 2024-08-12 NOTE — HISTORY OF PRESENT ILLNESS
[FreeTextEntry1] : Patient is a 63-year-old man with history of type 2 diabetes, sarcoidosis, here for endocrinology follow-up. For type 2 diabetes, diagnosed in 2017.  Complicated by diabetic neuropathy.  Currently taking Ozempic 2.0 mg once weekly.  But due to difficulty obtaining the medication, has been doing Ozempic 1.0 mg x2 doses each week. Denies any history of diabetic retinopathy, or nephropathy. Denies any history of CAD, CHF or CVA in the past.  Regarding hyperlipidemia, patient is currently on atorvastatin 10 mg once daily.  Denies any side effects.  Regarding hypertension, patient is on losartan 25 mg once daily.  Patient has a history of thyroid nodule, seen on ultrasound.  Last done in July 2022.  Subcentimeter.  No family history of thyroid cancer. [TextEntry] : July 30, 2024-August 12, 2024 CGM active 93% of the time Average glucose 129 mg/dL Glucose management indicator 6.4% Glucose variability 26.8% Target range 90% High 9% of the time Low 1% of the time

## 2024-08-14 LAB
CREAT SPEC-SCNC: 66 MG/DL
HBA1C MFR BLD HPLC: 6.2
MICROALBUMIN 24H UR DL<=1MG/L-MCNC: <1.2 MG/DL
MICROALBUMIN/CREAT 24H UR-RTO: NORMAL MG/G

## 2024-08-16 ENCOUNTER — RX RENEWAL (OUTPATIENT)
Age: 64
End: 2024-08-16

## 2024-08-23 ENCOUNTER — APPOINTMENT (OUTPATIENT)
Dept: RHEUMATOLOGY | Facility: CLINIC | Age: 64
End: 2024-08-23
Payer: MEDICARE

## 2024-08-23 VITALS
OXYGEN SATURATION: 97 % | HEIGHT: 62 IN | RESPIRATION RATE: 16 BRPM | BODY MASS INDEX: 24.31 KG/M2 | DIASTOLIC BLOOD PRESSURE: 89 MMHG | HEART RATE: 84 BPM | SYSTOLIC BLOOD PRESSURE: 132 MMHG | WEIGHT: 132.13 LBS

## 2024-08-23 DIAGNOSIS — D86.9 SARCOIDOSIS, UNSPECIFIED: ICD-10-CM

## 2024-08-23 DIAGNOSIS — R79.89 OTHER SPECIFIED ABNORMAL FINDINGS OF BLOOD CHEMISTRY: ICD-10-CM

## 2024-08-23 DIAGNOSIS — R05.8 OTHER SPECIFIED COUGH: ICD-10-CM

## 2024-08-23 DIAGNOSIS — Z79.899 OTHER LONG TERM (CURRENT) DRUG THERAPY: ICD-10-CM

## 2024-08-23 PROCEDURE — G2211 COMPLEX E/M VISIT ADD ON: CPT

## 2024-08-23 PROCEDURE — 99214 OFFICE O/P EST MOD 30 MIN: CPT

## 2024-08-23 RX ORDER — MYCOPHENOLATE MOFETIL 500 MG/1
500 TABLET ORAL
Qty: 60 | Refills: 1 | Status: ACTIVE | COMMUNITY
Start: 2024-08-23 | End: 1900-01-01

## 2024-08-23 NOTE — ASSESSMENT
[FreeTextEntry1] : Hold off on additional methotrexate being given elevated liver function test.  Use of MMF discussed.  Patient wishes to proceed.  Risk benefits and alternative discussed.  Written literature provided.   Start  mg twice a day awaiting MRI  check x-ray given dry cough f/u in 4-5 weeks   My collective time spent on today's visit was greater than 30 minutes and included: Preparation for the visit, review of the medical records, review of pertinent diagnostic studies, examination and counseling of the patient on the above diagnosis, treatment plan and prognosis, orders of diagnostic test, medications and or appropriate procedures and documentation in the medical record of today's visit.  transcription was done with voice recognition software.  Be aware that errors may occur despite editing.  If confirmation of any statement in this dictation is needed for care of the patient, please feel free to call me to discuss.

## 2024-08-23 NOTE — HISTORY OF PRESENT ILLNESS
[FreeTextEntry1] : Methotrexate is being held in light of elevated LFTs. Patient had blood work done prior to this office visit. The following labs reviewed CBC wnl CMP AST/ALT wnl; alk phos wnl  ESR wnl CRP 6  MRI abdomen pending 9/4/24  Currently feeling well without complaints of joint pain or joint swelling.  s/p URI 3 weeks ago now with residual cough but improving   No fevers or chills at this time.

## 2024-08-23 NOTE — PHYSICAL EXAM
[General Appearance - Alert] : alert [General Appearance - In No Acute Distress] : in no acute distress [Auscultation Breath Sounds / Voice Sounds] : lungs were clear to auscultation bilaterally [Edema] : there was no peripheral edema [Abnormal Walk] : normal gait [Nail Clubbing] : no clubbing  or cyanosis of the fingernails [Musculoskeletal - Swelling] : no joint swelling seen [Motor Tone] : muscle strength and tone were normal [Oriented To Time, Place, And Person] : oriented to person, place, and time [Impaired Insight] : insight and judgment were intact [Affect] : the affect was normal Notification Instructions: Patient will be notified of biopsy results. However, patient instructed to call the office if not contacted within 2 weeks.

## 2024-09-03 ENCOUNTER — RX RENEWAL (OUTPATIENT)
Age: 64
End: 2024-09-03

## 2024-09-04 ENCOUNTER — APPOINTMENT (OUTPATIENT)
Dept: MRI IMAGING | Facility: IMAGING CENTER | Age: 64
End: 2024-09-04

## 2024-09-04 ENCOUNTER — APPOINTMENT (OUTPATIENT)
Dept: RADIOLOGY | Facility: IMAGING CENTER | Age: 64
End: 2024-09-04

## 2024-09-04 ENCOUNTER — OUTPATIENT (OUTPATIENT)
Dept: OUTPATIENT SERVICES | Facility: HOSPITAL | Age: 64
LOS: 1 days | End: 2024-09-04
Payer: COMMERCIAL

## 2024-09-04 DIAGNOSIS — Z98.890 OTHER SPECIFIED POSTPROCEDURAL STATES: Chronic | ICD-10-CM

## 2024-09-04 DIAGNOSIS — Z00.8 ENCOUNTER FOR OTHER GENERAL EXAMINATION: ICD-10-CM

## 2024-09-04 DIAGNOSIS — Z41.9 ENCOUNTER FOR PROCEDURE FOR PURPOSES OTHER THAN REMEDYING HEALTH STATE, UNSPECIFIED: Chronic | ICD-10-CM

## 2024-09-04 PROCEDURE — 71046 X-RAY EXAM CHEST 2 VIEWS: CPT

## 2024-09-04 PROCEDURE — 74183 MRI ABD W/O CNTR FLWD CNTR: CPT

## 2024-09-04 PROCEDURE — A9585: CPT

## 2024-09-04 PROCEDURE — 71046 X-RAY EXAM CHEST 2 VIEWS: CPT | Mod: 26

## 2024-09-04 PROCEDURE — 74183 MRI ABD W/O CNTR FLWD CNTR: CPT | Mod: 26

## 2024-09-04 PROCEDURE — 70553 MRI BRAIN STEM W/O & W/DYE: CPT | Mod: 26

## 2024-09-04 PROCEDURE — 70553 MRI BRAIN STEM W/O & W/DYE: CPT

## 2024-09-17 ENCOUNTER — RX RENEWAL (OUTPATIENT)
Age: 64
End: 2024-09-17

## 2024-09-18 ENCOUNTER — APPOINTMENT (OUTPATIENT)
Dept: INTERNAL MEDICINE | Facility: CLINIC | Age: 64
End: 2024-09-18
Payer: MEDICARE

## 2024-09-18 VITALS
SYSTOLIC BLOOD PRESSURE: 126 MMHG | TEMPERATURE: 98.5 F | DIASTOLIC BLOOD PRESSURE: 90 MMHG | HEIGHT: 62 IN | BODY MASS INDEX: 24.66 KG/M2 | HEART RATE: 91 BPM | OXYGEN SATURATION: 98 % | WEIGHT: 134 LBS

## 2024-09-18 PROBLEM — R10.9 FLANK PAIN, ACUTE: Status: ACTIVE | Noted: 2024-09-18

## 2024-09-18 PROCEDURE — 36415 COLL VENOUS BLD VENIPUNCTURE: CPT

## 2024-09-18 PROCEDURE — 99214 OFFICE O/P EST MOD 30 MIN: CPT

## 2024-09-19 LAB
ALBUMIN SERPL ELPH-MCNC: 3.8 G/DL
ALP BLD-CCNC: 231 U/L
ALT SERPL-CCNC: 55 U/L
AMORPHOUS PRECEPITATE CRYSTALS: PRESENT
AMYLASE/CREAT SERPL: 60 U/L
ANION GAP SERPL CALC-SCNC: 10 MMOL/L
APPEARANCE: CLEAR
AST SERPL-CCNC: 52 U/L
BACTERIA: NEGATIVE /HPF
BASOPHILS # BLD AUTO: 0.03 K/UL
BASOPHILS NFR BLD AUTO: 0.6 %
BILIRUB SERPL-MCNC: 0.3 MG/DL
BILIRUBIN URINE: NEGATIVE
BLOOD URINE: NEGATIVE
BUN SERPL-MCNC: 23 MG/DL
CALCIUM OXALATE CRYSTALS: PRESENT
CALCIUM SERPL-MCNC: 10.4 MG/DL
CAST: 1 /LPF
CHLORIDE SERPL-SCNC: 102 MMOL/L
CO2 SERPL-SCNC: 26 MMOL/L
COLOR: YELLOW
CREAT SERPL-MCNC: 0.94 MG/DL
CRP SERPL-MCNC: 36 MG/L
EGFR: 67 ML/MIN/1.73M2
EOSINOPHIL # BLD AUTO: 0.24 K/UL
EOSINOPHIL NFR BLD AUTO: 4.6 %
EPITHELIAL CELLS: 0 /HPF
ERYTHROCYTE [SEDIMENTATION RATE] IN BLOOD BY WESTERGREN METHOD: 28 MM/HR
GLUCOSE QUALITATIVE U: 100 MG/DL
GLUCOSE SERPL-MCNC: 128 MG/DL
HCT VFR BLD CALC: 34.6 %
HGB BLD-MCNC: 11.5 G/DL
IMM GRANULOCYTES NFR BLD AUTO: 0.2 %
KETONES URINE: NEGATIVE MG/DL
LEUKOCYTE ESTERASE URINE: NEGATIVE
LPL SERPL-CCNC: 22 U/L
LYMPHOCYTES # BLD AUTO: 1.27 K/UL
LYMPHOCYTES NFR BLD AUTO: 24.2 %
MAN DIFF?: NORMAL
MCHC RBC-ENTMCNC: 31.3 PG
MCHC RBC-ENTMCNC: 33.2 GM/DL
MCV RBC AUTO: 94.3 FL
MICROSCOPIC-UA: NORMAL
MONOCYTES # BLD AUTO: 0.85 K/UL
MONOCYTES NFR BLD AUTO: 16.2 %
NEUTROPHILS # BLD AUTO: 2.85 K/UL
NEUTROPHILS NFR BLD AUTO: 54.2 %
NITRITE URINE: NEGATIVE
PH URINE: 6.5
PLATELET # BLD AUTO: 210 K/UL
POTASSIUM SERPL-SCNC: 4.8 MMOL/L
PROT SERPL-MCNC: 7.2 G/DL
PROTEIN URINE: NEGATIVE MG/DL
RBC # BLD: 3.67 M/UL
RBC # FLD: 13 %
RED BLOOD CELLS URINE: 0 /HPF
REVIEW: NORMAL
SODIUM SERPL-SCNC: 138 MMOL/L
SPECIFIC GRAVITY URINE: 1.02
UROBILINOGEN URINE: 1 MG/DL
WBC # FLD AUTO: 5.25 K/UL
WHITE BLOOD CELLS URINE: 0 /HPF

## 2024-09-19 NOTE — PHYSICAL EXAM
[No Edema] : there was no peripheral edema [Soft] : abdomen soft [Non Tender] : non-tender [Non-distended] : non-distended [No Masses] : no abdominal mass palpated [Normal Bowel Sounds] : normal bowel sounds [Normal] : no joint swelling and grossly normal strength and tone [No Rash] : no rash

## 2024-09-19 NOTE — ASSESSMENT
[FreeTextEntry1] : Patient with a new pain syndrome, with the pain most prominent in the L flank.  She also had some pain in the shoulder.  There is no rash, urinary symptoms, or chest pain/dyspnea.  She has a prior history of nephrolithiasis.  She is followed by Dr. Sutton (Rheumatology) for sarcoidosis and has had elevated LFTs which caused her methotrexate to be held.  She remains on mycophenolate for the sarcoidosis and also is on Ozempic for DM2.  For now, will send labs (CBC, CMP, Deyanira, Lip, U/A and C&S), and patient was given a script for a CT stone hunt to rule out stones.  She has a follow-up visit with her rheumatologist next week, and will discuss these symptoms there as well.  Labs drawn in office as below.

## 2024-09-19 NOTE — HISTORY OF PRESENT ILLNESS
[FreeTextEntry8] : Patient was feeling "funny" Friday night.  On Saturday, she had a severe pain on the L shoulder.  When she got home, her L flank was hurting her (10/10 pain).  She was afraid to go to the hospital, and did not go.  She took Aleve, and it helped for a few hours.  The pain is now 8/10.  She did not do any new exercise.  No fevers or chills.  She is cold all the time.  No rashes.  No nausea, vomiting, abdominal pain, diarrhea, or BRBPR.  No blood in the urine.  No dysuria.  No URI symptoms.

## 2024-09-20 ENCOUNTER — APPOINTMENT (OUTPATIENT)
Dept: CT IMAGING | Facility: CLINIC | Age: 64
End: 2024-09-20
Payer: MEDICARE

## 2024-09-20 LAB — BACTERIA UR CULT: NORMAL

## 2024-09-20 PROCEDURE — 74176 CT ABD & PELVIS W/O CONTRAST: CPT

## 2024-09-26 ENCOUNTER — APPOINTMENT (OUTPATIENT)
Dept: RHEUMATOLOGY | Facility: CLINIC | Age: 64
End: 2024-09-26
Payer: MEDICARE

## 2024-09-26 VITALS
HEART RATE: 79 BPM | HEIGHT: 62 IN | DIASTOLIC BLOOD PRESSURE: 90 MMHG | WEIGHT: 133 LBS | SYSTOLIC BLOOD PRESSURE: 145 MMHG | BODY MASS INDEX: 24.48 KG/M2 | OXYGEN SATURATION: 99 %

## 2024-09-26 DIAGNOSIS — D86.9 SARCOIDOSIS, UNSPECIFIED: ICD-10-CM

## 2024-09-26 DIAGNOSIS — D73.89 OTHER DISEASES OF SPLEEN: ICD-10-CM

## 2024-09-26 DIAGNOSIS — R10.9 UNSPECIFIED ABDOMINAL PAIN: ICD-10-CM

## 2024-09-26 DIAGNOSIS — Z79.899 OTHER LONG TERM (CURRENT) DRUG THERAPY: ICD-10-CM

## 2024-09-26 PROCEDURE — G2211 COMPLEX E/M VISIT ADD ON: CPT

## 2024-09-26 PROCEDURE — 99214 OFFICE O/P EST MOD 30 MIN: CPT

## 2024-09-27 ENCOUNTER — OUTPATIENT (OUTPATIENT)
Dept: OUTPATIENT SERVICES | Facility: HOSPITAL | Age: 64
LOS: 1 days | End: 2024-09-27
Payer: COMMERCIAL

## 2024-09-27 ENCOUNTER — APPOINTMENT (OUTPATIENT)
Dept: CT IMAGING | Facility: CLINIC | Age: 64
End: 2024-09-27
Payer: MEDICARE

## 2024-09-27 DIAGNOSIS — Z41.9 ENCOUNTER FOR PROCEDURE FOR PURPOSES OTHER THAN REMEDYING HEALTH STATE, UNSPECIFIED: Chronic | ICD-10-CM

## 2024-09-27 DIAGNOSIS — R10.9 UNSPECIFIED ABDOMINAL PAIN: ICD-10-CM

## 2024-09-27 DIAGNOSIS — Z00.8 ENCOUNTER FOR OTHER GENERAL EXAMINATION: ICD-10-CM

## 2024-09-27 DIAGNOSIS — Z98.890 OTHER SPECIFIED POSTPROCEDURAL STATES: Chronic | ICD-10-CM

## 2024-09-27 PROCEDURE — 74160 CT ABDOMEN W/CONTRAST: CPT

## 2024-09-27 PROCEDURE — 74160 CT ABDOMEN W/CONTRAST: CPT | Mod: 26

## 2024-09-28 PROBLEM — D73.89 SPLENIC LESION: Status: ACTIVE | Noted: 2024-09-28

## 2024-09-28 NOTE — HISTORY OF PRESENT ILLNESS
[FreeTextEntry1] : Patient presents for follow-up visit today.  She was seen by primary care physician for an evaluation of acute flank pain.  Patient started to feel unwell on Friday night and on Saturday she had severe pain in the left lateral abdomen.  She did not go to the hospital as she was afraid that she was going to be admitted.  She took Aleve and it helped for a few hours.  When she woke up she went to follow-up with primary care physician.  At the time of the follow-up pain was 8 out of 10.  She denies fevers, or chills.  She had no rashes no nausea no vomiting.  Patient reports having chills.  She was asked to complete CT of the abdomen.  CT findings reviewed and patient was found to have a new lesion in the spleen.  Additional imaging is recommended. Patient continues to have mild pain in the left lateral flank.  She has no fevers at this time.

## 2024-09-28 NOTE — ASSESSMENT
[FreeTextEntry1] : Status post CT of the abdomen to evaluate acute flank pain.  Patient was found to have new splenic lesion.  Etiology is unclear.  ? Abscess as per radiology report.  Additional imaging studies are recommended.  Hold mycophenolate at this time.  Blood work is suggestive of acute inflammatory process.    Office visit after imaging is complete.  My collective time spent on today's visit was greater than 30 minutes and included: Preparation for the visit, review of the medical records, review of pertinent diagnostic studies, examination and counseling of the patient on the above diagnosis, treatment plan and prognosis, orders of diagnostic test, medications and or appropriate procedures and documentation in the medical record of today's visit.

## 2024-09-28 NOTE — PHYSICAL EXAM
[General Appearance - Alert] : alert [General Appearance - In No Acute Distress] : in no acute distress [Full Pulse] : the pedal pulses are present [Edema] : there was no peripheral edema [Abdomen Tenderness] : non-tender [FreeTextEntry1] : no CVA tenderness noted [Abnormal Walk] : normal gait [Nail Clubbing] : no clubbing  or cyanosis of the fingernails [Motor Tone] : muscle strength and tone were normal [Musculoskeletal - Swelling] : no joint swelling seen

## 2024-10-09 NOTE — DISCHARGE NOTE PROVIDER - NSDCCAREPROVSEEN_GEN_ALL_CORE_FT
Call to ER from Jordyn, patient's niece. Per Trina Murphy, RN, Jorydn advised that she spoke to Robel, brother, and he will pick patient up from ER.    All parties advised accordingly. SPD/non-emergent transport request cancelled per charge nurse.    Rea Kelley

## 2024-10-24 ENCOUNTER — APPOINTMENT (OUTPATIENT)
Dept: NEUROLOGY | Facility: CLINIC | Age: 64
End: 2024-10-24
Payer: MEDICARE

## 2024-10-24 VITALS
BODY MASS INDEX: 24.48 KG/M2 | HEIGHT: 62 IN | SYSTOLIC BLOOD PRESSURE: 142 MMHG | WEIGHT: 133 LBS | HEART RATE: 84 BPM | DIASTOLIC BLOOD PRESSURE: 89 MMHG

## 2024-10-24 DIAGNOSIS — R41.3 OTHER AMNESIA: ICD-10-CM

## 2024-10-24 PROCEDURE — G2211 COMPLEX E/M VISIT ADD ON: CPT

## 2024-10-24 PROCEDURE — 99215 OFFICE O/P EST HI 40 MIN: CPT

## 2024-10-28 ENCOUNTER — APPOINTMENT (OUTPATIENT)
Dept: RHEUMATOLOGY | Facility: CLINIC | Age: 64
End: 2024-10-28
Payer: MEDICARE

## 2024-10-28 VITALS
BODY MASS INDEX: 23.92 KG/M2 | HEART RATE: 80 BPM | SYSTOLIC BLOOD PRESSURE: 150 MMHG | DIASTOLIC BLOOD PRESSURE: 89 MMHG | WEIGHT: 130 LBS | OXYGEN SATURATION: 98 % | HEIGHT: 62 IN

## 2024-10-28 DIAGNOSIS — K21.9 GASTRO-ESOPHAGEAL REFLUX DISEASE W/OUT ESOPHAGITIS: ICD-10-CM

## 2024-10-28 DIAGNOSIS — D86.9 SARCOIDOSIS, UNSPECIFIED: ICD-10-CM

## 2024-10-28 DIAGNOSIS — I25.10 ATHEROSCLEROTIC HEART DISEASE OF NATIVE CORONARY ARTERY W/OUT ANGINA PECTORIS: ICD-10-CM

## 2024-10-28 PROCEDURE — 99215 OFFICE O/P EST HI 40 MIN: CPT

## 2024-10-28 PROCEDURE — G2211 COMPLEX E/M VISIT ADD ON: CPT

## 2024-10-28 RX ORDER — OMEPRAZOLE 40 MG/1
40 CAPSULE, DELAYED RELEASE ORAL
Qty: 30 | Refills: 2 | Status: ACTIVE | COMMUNITY
Start: 2024-10-28 | End: 1900-01-01

## 2024-10-29 LAB
ALBUMIN SERPL ELPH-MCNC: 4.2 G/DL
ALP BLD-CCNC: 174 U/L
ALT SERPL-CCNC: 31 U/L
ANION GAP SERPL CALC-SCNC: 13 MMOL/L
AST SERPL-CCNC: 30 U/L
BASOPHILS # BLD AUTO: 0.03 K/UL
BASOPHILS NFR BLD AUTO: 0.6 %
BILIRUB SERPL-MCNC: 0.6 MG/DL
BUN SERPL-MCNC: 12 MG/DL
CALCIUM SERPL-MCNC: 10.6 MG/DL
CHLORIDE SERPL-SCNC: 102 MMOL/L
CO2 SERPL-SCNC: 24 MMOL/L
CREAT SERPL-MCNC: 0.88 MG/DL
CRP SERPL-MCNC: 12 MG/L
EGFR: 73 ML/MIN/1.73M2
EOSINOPHIL # BLD AUTO: 0.32 K/UL
EOSINOPHIL NFR BLD AUTO: 6.5 %
ERYTHROCYTE [SEDIMENTATION RATE] IN BLOOD BY WESTERGREN METHOD: 55 MM/HR
HCT VFR BLD CALC: 38.6 %
HGB BLD-MCNC: 12.5 G/DL
IMM GRANULOCYTES NFR BLD AUTO: 0.2 %
LYMPHOCYTES # BLD AUTO: 1.13 K/UL
LYMPHOCYTES NFR BLD AUTO: 22.9 %
MAN DIFF?: NORMAL
MCHC RBC-ENTMCNC: 31.1 PG
MCHC RBC-ENTMCNC: 32.4 GM/DL
MCV RBC AUTO: 96 FL
MONOCYTES # BLD AUTO: 0.78 K/UL
MONOCYTES NFR BLD AUTO: 15.8 %
NEUTROPHILS # BLD AUTO: 2.67 K/UL
NEUTROPHILS NFR BLD AUTO: 54 %
PLATELET # BLD AUTO: 186 K/UL
POTASSIUM SERPL-SCNC: 4.8 MMOL/L
PROT SERPL-MCNC: 7.5 G/DL
RBC # BLD: 4.02 M/UL
RBC # FLD: 13.1 %
SODIUM SERPL-SCNC: 139 MMOL/L
WBC # FLD AUTO: 4.94 K/UL

## 2024-10-31 ENCOUNTER — APPOINTMENT (OUTPATIENT)
Dept: CARDIOLOGY | Facility: CLINIC | Age: 64
End: 2024-10-31
Payer: MEDICARE

## 2024-10-31 ENCOUNTER — NON-APPOINTMENT (OUTPATIENT)
Age: 64
End: 2024-10-31

## 2024-10-31 VITALS
OXYGEN SATURATION: 100 % | HEIGHT: 62 IN | DIASTOLIC BLOOD PRESSURE: 102 MMHG | WEIGHT: 130 LBS | SYSTOLIC BLOOD PRESSURE: 166 MMHG | BODY MASS INDEX: 23.92 KG/M2 | HEART RATE: 84 BPM

## 2024-10-31 DIAGNOSIS — R07.89 OTHER CHEST PAIN: ICD-10-CM

## 2024-10-31 DIAGNOSIS — R06.02 SHORTNESS OF BREATH: ICD-10-CM

## 2024-10-31 DIAGNOSIS — I10 ESSENTIAL (PRIMARY) HYPERTENSION: ICD-10-CM

## 2024-10-31 PROCEDURE — 99204 OFFICE O/P NEW MOD 45 MIN: CPT

## 2024-10-31 PROCEDURE — 93000 ELECTROCARDIOGRAM COMPLETE: CPT

## 2024-11-18 ENCOUNTER — APPOINTMENT (OUTPATIENT)
Dept: ENDOCRINOLOGY | Facility: CLINIC | Age: 64
End: 2024-11-18
Payer: MEDICARE

## 2024-11-18 VITALS
HEART RATE: 89 BPM | SYSTOLIC BLOOD PRESSURE: 140 MMHG | DIASTOLIC BLOOD PRESSURE: 92 MMHG | BODY MASS INDEX: 23.92 KG/M2 | OXYGEN SATURATION: 98 % | HEIGHT: 62 IN | WEIGHT: 130 LBS

## 2024-11-18 DIAGNOSIS — E83.52 HYPERCALCEMIA: ICD-10-CM

## 2024-11-18 DIAGNOSIS — E78.5 HYPERLIPIDEMIA, UNSPECIFIED: ICD-10-CM

## 2024-11-18 DIAGNOSIS — I10 ESSENTIAL (PRIMARY) HYPERTENSION: ICD-10-CM

## 2024-11-18 LAB — HBA1C MFR BLD HPLC: 6.3

## 2024-11-18 PROCEDURE — 99214 OFFICE O/P EST MOD 30 MIN: CPT

## 2024-11-18 PROCEDURE — 83036 HEMOGLOBIN GLYCOSYLATED A1C: CPT | Mod: QW

## 2024-11-18 PROCEDURE — G2211 COMPLEX E/M VISIT ADD ON: CPT

## 2024-11-25 ENCOUNTER — APPOINTMENT (OUTPATIENT)
Dept: RHEUMATOLOGY | Facility: CLINIC | Age: 64
End: 2024-11-25
Payer: MEDICARE

## 2024-11-25 VITALS
RESPIRATION RATE: 16 BRPM | BODY MASS INDEX: 23.92 KG/M2 | HEART RATE: 76 BPM | SYSTOLIC BLOOD PRESSURE: 137 MMHG | DIASTOLIC BLOOD PRESSURE: 85 MMHG | HEIGHT: 62 IN | OXYGEN SATURATION: 98 % | WEIGHT: 130 LBS

## 2024-11-25 DIAGNOSIS — D86.9 SARCOIDOSIS, UNSPECIFIED: ICD-10-CM

## 2024-11-25 DIAGNOSIS — R07.89 OTHER CHEST PAIN: ICD-10-CM

## 2024-11-25 DIAGNOSIS — J06.9 ACUTE UPPER RESPIRATORY INFECTION, UNSPECIFIED: ICD-10-CM

## 2024-11-25 PROCEDURE — 99213 OFFICE O/P EST LOW 20 MIN: CPT

## 2024-11-27 PROBLEM — J06.9 URI, ACUTE: Status: ACTIVE | Noted: 2024-11-27 | Resolved: 2024-12-27

## 2024-12-04 ENCOUNTER — APPOINTMENT (OUTPATIENT)
Dept: CV DIAGNOSTICS | Facility: HOSPITAL | Age: 64
End: 2024-12-04

## 2024-12-04 ENCOUNTER — OUTPATIENT (OUTPATIENT)
Dept: OUTPATIENT SERVICES | Facility: HOSPITAL | Age: 64
LOS: 1 days | End: 2024-12-04
Payer: MEDICARE

## 2024-12-04 ENCOUNTER — RESULT REVIEW (OUTPATIENT)
Age: 64
End: 2024-12-04

## 2024-12-04 ENCOUNTER — APPOINTMENT (OUTPATIENT)
Dept: CV DIAGNOSITCS | Facility: HOSPITAL | Age: 64
End: 2024-12-04

## 2024-12-04 DIAGNOSIS — I10 ESSENTIAL (PRIMARY) HYPERTENSION: ICD-10-CM

## 2024-12-04 DIAGNOSIS — Z41.9 ENCOUNTER FOR PROCEDURE FOR PURPOSES OTHER THAN REMEDYING HEALTH STATE, UNSPECIFIED: Chronic | ICD-10-CM

## 2024-12-04 DIAGNOSIS — Z98.890 OTHER SPECIFIED POSTPROCEDURAL STATES: Chronic | ICD-10-CM

## 2024-12-04 DIAGNOSIS — R07.89 OTHER CHEST PAIN: ICD-10-CM

## 2024-12-04 PROCEDURE — 93018 CV STRESS TEST I&R ONLY: CPT | Mod: GC,MC

## 2024-12-04 PROCEDURE — 78451 HT MUSCLE IMAGE SPECT SING: CPT | Mod: 26,MC

## 2024-12-04 PROCEDURE — 93306 TTE W/DOPPLER COMPLETE: CPT | Mod: 26

## 2024-12-04 PROCEDURE — 93016 CV STRESS TEST SUPVJ ONLY: CPT | Mod: GC,MC

## 2024-12-05 ENCOUNTER — NON-APPOINTMENT (OUTPATIENT)
Age: 64
End: 2024-12-05

## 2024-12-20 ENCOUNTER — RX RENEWAL (OUTPATIENT)
Age: 64
End: 2024-12-20

## 2024-12-20 RX ORDER — LOSARTAN POTASSIUM 25 MG/1
25 TABLET, FILM COATED ORAL
Qty: 90 | Refills: 3 | Status: ACTIVE | COMMUNITY
Start: 2024-12-20 | End: 1900-01-01

## 2025-01-06 ENCOUNTER — APPOINTMENT (OUTPATIENT)
Dept: HEPATOLOGY | Facility: CLINIC | Age: 65
End: 2025-01-06
Payer: MEDICARE

## 2025-01-06 DIAGNOSIS — D86.9 SARCOIDOSIS, UNSPECIFIED: ICD-10-CM

## 2025-01-06 PROCEDURE — 76981 USE PARENCHYMA: CPT

## 2025-02-05 ENCOUNTER — APPOINTMENT (OUTPATIENT)
Dept: NEUROLOGY | Facility: CLINIC | Age: 65
End: 2025-02-05

## 2025-02-05 PROCEDURE — 96133 NRPSYC TST EVAL PHYS/QHP EA: CPT

## 2025-02-05 PROCEDURE — 96116 NUBHVL XM PHYS/QHP 1ST HR: CPT

## 2025-02-05 PROCEDURE — 96138 PSYCL/NRPSYC TECH 1ST: CPT | Mod: NC

## 2025-02-05 PROCEDURE — 96132 NRPSYC TST EVAL PHYS/QHP 1ST: CPT

## 2025-02-05 PROCEDURE — 96139 PSYCL/NRPSYC TST TECH EA: CPT | Mod: NC

## 2025-02-06 DIAGNOSIS — E83.52 HYPERCALCEMIA: ICD-10-CM

## 2025-02-10 LAB
ALBUMIN SERPL ELPH-MCNC: 3.8 G/DL
ALP BLD-CCNC: 177 U/L
ALT SERPL-CCNC: 26 U/L
ANION GAP SERPL CALC-SCNC: 9 MMOL/L
AST SERPL-CCNC: 22 U/L
BILIRUB SERPL-MCNC: 0.5 MG/DL
BUN SERPL-MCNC: 22 MG/DL
CA-I SERPL-SCNC: 6.3 MG/DL
CALCIUM SERPL-MCNC: 11.8 MG/DL
CALCIUM SERPL-MCNC: 11.8 MG/DL
CHLORIDE SERPL-SCNC: 102 MMOL/L
CO2 SERPL-SCNC: 25 MMOL/L
CREAT SERPL-MCNC: 1.21 MG/DL
EGFR: 49 ML/MIN/1.73M2
GLUCOSE SERPL-MCNC: 120 MG/DL
PARATHYROID HORMONE INTACT: 8 PG/ML
POTASSIUM SERPL-SCNC: 4.1 MMOL/L
PROT SERPL-MCNC: 7.3 G/DL
SODIUM SERPL-SCNC: 137 MMOL/L

## 2025-02-12 ENCOUNTER — APPOINTMENT (OUTPATIENT)
Dept: NEUROLOGY | Facility: CLINIC | Age: 65
End: 2025-02-12
Payer: MEDICARE

## 2025-02-12 PROCEDURE — 96139 PSYCL/NRPSYC TST TECH EA: CPT | Mod: NC

## 2025-02-12 PROCEDURE — 96133 NRPSYC TST EVAL PHYS/QHP EA: CPT

## 2025-02-17 ENCOUNTER — RX RENEWAL (OUTPATIENT)
Age: 65
End: 2025-02-17

## 2025-02-24 ENCOUNTER — NON-APPOINTMENT (OUTPATIENT)
Age: 65
End: 2025-02-24

## 2025-03-03 ENCOUNTER — APPOINTMENT (OUTPATIENT)
Dept: ENDOCRINOLOGY | Facility: CLINIC | Age: 65
End: 2025-03-03

## 2025-03-07 ENCOUNTER — RX RENEWAL (OUTPATIENT)
Age: 65
End: 2025-03-07

## 2025-03-14 ENCOUNTER — APPOINTMENT (OUTPATIENT)
Dept: NEUROLOGY | Facility: CLINIC | Age: 65
End: 2025-03-14

## 2025-03-14 PROCEDURE — 96133 NRPSYC TST EVAL PHYS/QHP EA: CPT

## 2025-03-24 ENCOUNTER — APPOINTMENT (OUTPATIENT)
Dept: RHEUMATOLOGY | Facility: CLINIC | Age: 65
End: 2025-03-24
Payer: MEDICARE

## 2025-03-24 ENCOUNTER — LABORATORY RESULT (OUTPATIENT)
Age: 65
End: 2025-03-24

## 2025-03-24 VITALS
HEART RATE: 92 BPM | BODY MASS INDEX: 22.63 KG/M2 | WEIGHT: 123 LBS | HEIGHT: 62 IN | SYSTOLIC BLOOD PRESSURE: 146 MMHG | DIASTOLIC BLOOD PRESSURE: 92 MMHG | OXYGEN SATURATION: 99 %

## 2025-03-24 DIAGNOSIS — R59.1 GENERALIZED ENLARGED LYMPH NODES: ICD-10-CM

## 2025-03-24 DIAGNOSIS — E83.52 HYPERCALCEMIA: ICD-10-CM

## 2025-03-24 DIAGNOSIS — R41.3 OTHER AMNESIA: ICD-10-CM

## 2025-03-24 DIAGNOSIS — D86.9 SARCOIDOSIS, UNSPECIFIED: ICD-10-CM

## 2025-03-24 PROCEDURE — 99215 OFFICE O/P EST HI 40 MIN: CPT

## 2025-03-24 PROCEDURE — G2211 COMPLEX E/M VISIT ADD ON: CPT

## 2025-03-25 DIAGNOSIS — R22.1 LOCALIZED SWELLING, MASS AND LUMP, NECK: ICD-10-CM

## 2025-03-25 RX ORDER — PREDNISONE 10 MG/1
10 TABLET ORAL
Qty: 30 | Refills: 1 | Status: ACTIVE | COMMUNITY
Start: 2025-03-25 | End: 1900-01-01

## 2025-03-29 ENCOUNTER — APPOINTMENT (OUTPATIENT)
Dept: CT IMAGING | Facility: IMAGING CENTER | Age: 65
End: 2025-03-29

## 2025-04-02 LAB
ACE BLD-CCNC: 235 U/L
ALBUMIN SERPL ELPH-MCNC: 4.3 G/DL
ALP BLD-CCNC: 213 U/L
ALT SERPL-CCNC: 33 U/L
ANION GAP SERPL CALC-SCNC: 12 MMOL/L
AST SERPL-CCNC: 36 U/L
BASOPHILS # BLD AUTO: 0.03 K/UL
BASOPHILS NFR BLD AUTO: 0.5 %
BILIRUB SERPL-MCNC: 0.5 MG/DL
BUN SERPL-MCNC: 25 MG/DL
CALCIUM SERPL-MCNC: 11.3 MG/DL
CHLORIDE SERPL-SCNC: 102 MMOL/L
CO2 SERPL-SCNC: 26 MMOL/L
CREAT SERPL-MCNC: 1.13 MG/DL
CRP SERPL-MCNC: 13 MG/L
EGFRCR SERPLBLD CKD-EPI 2021: 54 ML/MIN/1.73M2
EOSINOPHIL # BLD AUTO: 0.27 K/UL
EOSINOPHIL NFR BLD AUTO: 4.5 %
ERYTHROCYTE [SEDIMENTATION RATE] IN BLOOD BY WESTERGREN METHOD: 60 MM/HR
GGT SERPL-CCNC: 120 U/L
HAV IGM SER QL: NONREACTIVE
HBV CORE IGG+IGM SER QL: REACTIVE
HBV CORE IGM SER QL: NONREACTIVE
HBV SURFACE AG SER QL: NONREACTIVE
HCT VFR BLD CALC: 39.4 %
HCV AB SER QL: NONREACTIVE
HCV S/CO RATIO: 0.13 S/CO
HGB BLD-MCNC: 12.7 G/DL
IMM GRANULOCYTES NFR BLD AUTO: 0.3 %
LDH SERPL-CCNC: 224 U/L
LYMPHOCYTES # BLD AUTO: 1.51 K/UL
LYMPHOCYTES NFR BLD AUTO: 25.1 %
M TB IFN-G BLD-IMP: NEGATIVE
MAN DIFF?: NORMAL
MCHC RBC-ENTMCNC: 30.8 PG
MCHC RBC-ENTMCNC: 32.2 G/DL
MCV RBC AUTO: 95.4 FL
MONOCYTES # BLD AUTO: 1.1 K/UL
MONOCYTES NFR BLD AUTO: 18.3 %
NEUTROPHILS # BLD AUTO: 3.09 K/UL
NEUTROPHILS NFR BLD AUTO: 51.3 %
PLATELET # BLD AUTO: 253 K/UL
POTASSIUM SERPL-SCNC: 4.4 MMOL/L
PROT SERPL-MCNC: 8.1 G/DL
QUANTIFERON TB PLUS MITOGEN MINUS NIL: 1.68 IU/ML
QUANTIFERON TB PLUS NIL: 0.17 IU/ML
QUANTIFERON TB PLUS TB1 MINUS NIL: 0.02 IU/ML
QUANTIFERON TB PLUS TB2 MINUS NIL: 0.04 IU/ML
RBC # BLD: 4.13 M/UL
RBC # FLD: 13.7 %
SODIUM SERPL-SCNC: 140 MMOL/L
WBC # FLD AUTO: 6.02 K/UL

## 2025-04-03 DIAGNOSIS — M54.12 RADICULOPATHY, CERVICAL REGION: ICD-10-CM

## 2025-04-07 ENCOUNTER — OUTPATIENT (OUTPATIENT)
Dept: OUTPATIENT SERVICES | Facility: HOSPITAL | Age: 65
LOS: 1 days | End: 2025-04-07
Payer: COMMERCIAL

## 2025-04-07 ENCOUNTER — APPOINTMENT (OUTPATIENT)
Dept: ULTRASOUND IMAGING | Facility: CLINIC | Age: 65
End: 2025-04-07

## 2025-04-07 DIAGNOSIS — Z41.9 ENCOUNTER FOR PROCEDURE FOR PURPOSES OTHER THAN REMEDYING HEALTH STATE, UNSPECIFIED: Chronic | ICD-10-CM

## 2025-04-07 PROCEDURE — 76536 US EXAM OF HEAD AND NECK: CPT | Mod: 26

## 2025-04-07 PROCEDURE — 76536 US EXAM OF HEAD AND NECK: CPT

## 2025-04-10 ENCOUNTER — APPOINTMENT (OUTPATIENT)
Dept: SURGERY | Facility: CLINIC | Age: 65
End: 2025-04-10
Payer: MEDICARE

## 2025-04-10 VITALS
HEART RATE: 76 BPM | SYSTOLIC BLOOD PRESSURE: 152 MMHG | BODY MASS INDEX: 21.79 KG/M2 | DIASTOLIC BLOOD PRESSURE: 78 MMHG | HEIGHT: 63 IN | WEIGHT: 123 LBS

## 2025-04-10 DIAGNOSIS — D86.9 SARCOIDOSIS, UNSPECIFIED: ICD-10-CM

## 2025-04-10 DIAGNOSIS — R59.0 LOCALIZED ENLARGED LYMPH NODES: ICD-10-CM

## 2025-04-10 LAB
ALBUMIN SERPL ELPH-MCNC: 4.2 G/DL
ALP BLD-CCNC: 198 U/L
ALT SERPL-CCNC: 29 U/L
ANION GAP SERPL CALC-SCNC: 12 MMOL/L
AST SERPL-CCNC: 24 U/L
BILIRUB SERPL-MCNC: 0.6 MG/DL
BUN SERPL-MCNC: 22 MG/DL
CALCIUM SERPL-MCNC: 12.5 MG/DL
CHLORIDE SERPL-SCNC: 101 MMOL/L
CHOLEST SERPL-MCNC: 168 MG/DL
CO2 SERPL-SCNC: 25 MMOL/L
CREAT SERPL-MCNC: 1.08 MG/DL
EGFRCR SERPLBLD CKD-EPI 2021: 57 ML/MIN/1.73M2
ESTIMATED AVERAGE GLUCOSE: 137 MG/DL
GLUCOSE SERPL-MCNC: 127 MG/DL
HBA1C MFR BLD HPLC: 6.4 %
HCT VFR BLD CALC: 37.1 %
HDLC SERPL-MCNC: 60 MG/DL
HGB BLD-MCNC: 12.2 G/DL
LDLC SERPL-MCNC: 92 MG/DL
MAGNESIUM SERPL-MCNC: 2 MG/DL
MCHC RBC-ENTMCNC: 31 PG
MCHC RBC-ENTMCNC: 32.9 G/DL
MCV RBC AUTO: 94.4 FL
NONHDLC SERPL-MCNC: 108 MG/DL
PLATELET # BLD AUTO: 231 K/UL
POTASSIUM SERPL-SCNC: 4.2 MMOL/L
PROT SERPL-MCNC: 7.8 G/DL
RBC # BLD: 3.93 M/UL
RBC # FLD: 13.4 %
SODIUM SERPL-SCNC: 138 MMOL/L
TRIGL SERPL-MCNC: 85 MG/DL
TSH SERPL-ACNC: 1.81 UIU/ML
WBC # FLD AUTO: 7.56 K/UL

## 2025-04-10 PROCEDURE — 99204 OFFICE O/P NEW MOD 45 MIN: CPT | Mod: 25

## 2025-04-10 PROCEDURE — 31575 DIAGNOSTIC LARYNGOSCOPY: CPT

## 2025-04-12 ENCOUNTER — NON-APPOINTMENT (OUTPATIENT)
Age: 65
End: 2025-04-12

## 2025-04-15 ENCOUNTER — NON-APPOINTMENT (OUTPATIENT)
Age: 65
End: 2025-04-15

## 2025-04-15 LAB — G6PD SER-CCNC: 13.9 U/G HGB

## 2025-04-15 RX ORDER — HYDROXYCHLOROQUINE SULFATE 200 MG/1
200 TABLET, FILM COATED ORAL
Qty: 60 | Refills: 1 | Status: ACTIVE | COMMUNITY
Start: 2025-04-15 | End: 1900-01-01

## 2025-04-16 ENCOUNTER — RESULT REVIEW (OUTPATIENT)
Age: 65
End: 2025-04-16

## 2025-04-21 ENCOUNTER — NON-APPOINTMENT (OUTPATIENT)
Age: 65
End: 2025-04-21

## 2025-04-21 ENCOUNTER — APPOINTMENT (OUTPATIENT)
Dept: CT IMAGING | Facility: IMAGING CENTER | Age: 65
End: 2025-04-21
Payer: MEDICARE

## 2025-04-21 ENCOUNTER — APPOINTMENT (OUTPATIENT)
Dept: INTERNAL MEDICINE | Facility: CLINIC | Age: 65
End: 2025-04-21

## 2025-04-21 ENCOUNTER — OUTPATIENT (OUTPATIENT)
Dept: OUTPATIENT SERVICES | Facility: HOSPITAL | Age: 65
LOS: 1 days | End: 2025-04-21
Payer: COMMERCIAL

## 2025-04-21 VITALS
OXYGEN SATURATION: 96 % | SYSTOLIC BLOOD PRESSURE: 110 MMHG | HEIGHT: 62 IN | TEMPERATURE: 98 F | WEIGHT: 127 LBS | DIASTOLIC BLOOD PRESSURE: 76 MMHG | RESPIRATION RATE: 17 BRPM | HEART RATE: 87 BPM | BODY MASS INDEX: 23.37 KG/M2

## 2025-04-21 DIAGNOSIS — D86.9 SARCOIDOSIS, UNSPECIFIED: ICD-10-CM

## 2025-04-21 DIAGNOSIS — Z98.890 OTHER SPECIFIED POSTPROCEDURAL STATES: Chronic | ICD-10-CM

## 2025-04-21 DIAGNOSIS — Z41.9 ENCOUNTER FOR PROCEDURE FOR PURPOSES OTHER THAN REMEDYING HEALTH STATE, UNSPECIFIED: Chronic | ICD-10-CM

## 2025-04-21 DIAGNOSIS — I10 ESSENTIAL (PRIMARY) HYPERTENSION: ICD-10-CM

## 2025-04-21 DIAGNOSIS — R22.1 LOCALIZED SWELLING, MASS AND LUMP, NECK: ICD-10-CM

## 2025-04-21 DIAGNOSIS — E78.5 HYPERLIPIDEMIA, UNSPECIFIED: ICD-10-CM

## 2025-04-21 DIAGNOSIS — Z00.00 ENCOUNTER FOR GENERAL ADULT MEDICAL EXAMINATION W/OUT ABNORMAL FINDINGS: ICD-10-CM

## 2025-04-21 DIAGNOSIS — R73.03 PREDIABETES.: ICD-10-CM

## 2025-04-21 DIAGNOSIS — Z00.8 ENCOUNTER FOR OTHER GENERAL EXAMINATION: ICD-10-CM

## 2025-04-21 DIAGNOSIS — E83.52 HYPERCALCEMIA: ICD-10-CM

## 2025-04-21 PROCEDURE — G2211 COMPLEX E/M VISIT ADD ON: CPT

## 2025-04-21 PROCEDURE — G0438: CPT

## 2025-04-21 PROCEDURE — 70490 CT SOFT TISSUE NECK W/O DYE: CPT | Mod: 26

## 2025-04-21 PROCEDURE — 99213 OFFICE O/P EST LOW 20 MIN: CPT | Mod: 25

## 2025-04-21 PROCEDURE — 70490 CT SOFT TISSUE NECK W/O DYE: CPT

## 2025-04-22 ENCOUNTER — OUTPATIENT (OUTPATIENT)
Dept: OUTPATIENT SERVICES | Facility: HOSPITAL | Age: 65
LOS: 1 days | End: 2025-04-22
Payer: COMMERCIAL

## 2025-04-22 ENCOUNTER — RESULT REVIEW (OUTPATIENT)
Age: 65
End: 2025-04-22

## 2025-04-22 ENCOUNTER — APPOINTMENT (OUTPATIENT)
Dept: ULTRASOUND IMAGING | Facility: IMAGING CENTER | Age: 65
End: 2025-04-22
Payer: MEDICARE

## 2025-04-22 DIAGNOSIS — Z00.8 ENCOUNTER FOR OTHER GENERAL EXAMINATION: ICD-10-CM

## 2025-04-22 DIAGNOSIS — Z98.890 OTHER SPECIFIED POSTPROCEDURAL STATES: Chronic | ICD-10-CM

## 2025-04-22 DIAGNOSIS — D86.9 SARCOIDOSIS, UNSPECIFIED: ICD-10-CM

## 2025-04-22 PROCEDURE — 76942 ECHO GUIDE FOR BIOPSY: CPT

## 2025-04-22 PROCEDURE — 88184 FLOWCYTOMETRY/ TC 1 MARKER: CPT

## 2025-04-22 PROCEDURE — 88185 FLOWCYTOMETRY/TC ADD-ON: CPT

## 2025-04-22 PROCEDURE — 88189 FLOWCYTOMETRY/READ 16 & >: CPT | Mod: 59

## 2025-04-22 PROCEDURE — 38505 NEEDLE BIOPSY LYMPH NODES: CPT

## 2025-04-22 PROCEDURE — 38505 NEEDLE BIOPSY LYMPH NODES: CPT | Mod: LT

## 2025-04-22 PROCEDURE — 76942 ECHO GUIDE FOR BIOPSY: CPT | Mod: 26

## 2025-04-22 PROCEDURE — 87205 SMEAR GRAM STAIN: CPT

## 2025-04-23 LAB — TM INTERPRETATION: SIGNIFICANT CHANGE UP

## 2025-04-25 ENCOUNTER — APPOINTMENT (OUTPATIENT)
Dept: OTOLARYNGOLOGY | Facility: CLINIC | Age: 65
End: 2025-04-25
Payer: MEDICARE

## 2025-04-25 VITALS
HEIGHT: 62 IN | SYSTOLIC BLOOD PRESSURE: 139 MMHG | BODY MASS INDEX: 23.37 KG/M2 | WEIGHT: 127 LBS | DIASTOLIC BLOOD PRESSURE: 84 MMHG

## 2025-04-25 DIAGNOSIS — H69.91 UNSPECIFIED EUSTACHIAN TUBE DISORDER, RIGHT EAR: ICD-10-CM

## 2025-04-25 DIAGNOSIS — H93.293 OTHER ABNORMAL AUDITORY PERCEPTIONS, BILATERAL: ICD-10-CM

## 2025-04-25 DIAGNOSIS — H90.3 SENSORINEURAL HEARING LOSS, BILATERAL: ICD-10-CM

## 2025-04-25 LAB — NON-GYNECOLOGICAL CYTOLOGY STUDY: SIGNIFICANT CHANGE UP

## 2025-04-25 PROCEDURE — G0268 REMOVAL OF IMPACTED WAX MD: CPT

## 2025-04-25 PROCEDURE — 92557 COMPREHENSIVE HEARING TEST: CPT

## 2025-04-25 PROCEDURE — 99203 OFFICE O/P NEW LOW 30 MIN: CPT | Mod: 25

## 2025-04-25 PROCEDURE — 92567 TYMPANOMETRY: CPT

## 2025-04-29 PROBLEM — H69.91 DYSFUNCTION OF RIGHT EUSTACHIAN TUBE: Status: ACTIVE | Noted: 2025-04-29

## 2025-04-29 PROBLEM — H90.3 SENSORINEURAL HEARING LOSS (SNHL) OF BOTH EARS: Status: ACTIVE | Noted: 2025-04-29

## 2025-04-29 PROBLEM — H93.293 OTHER ABNORMAL AUDITORY PERCEPTIONS, BILATERAL: Status: ACTIVE | Noted: 2025-04-29

## 2025-04-29 RX ORDER — SOD CHLOR,BICARB/SQUEEZ BOTTLE
PACKET, WITH RINSE DEVICE NASAL
Qty: 100 | Refills: 2 | Status: ACTIVE | COMMUNITY
Start: 2025-04-29 | End: 1900-01-01

## 2025-04-29 RX ORDER — FLUTICASONE PROPIONATE 50 UG/1
50 SPRAY, METERED NASAL TWICE DAILY
Qty: 1 | Refills: 3 | Status: ACTIVE | COMMUNITY
Start: 2025-04-29 | End: 1900-01-01

## 2025-04-30 ENCOUNTER — APPOINTMENT (OUTPATIENT)
Dept: MRI IMAGING | Facility: IMAGING CENTER | Age: 65
End: 2025-04-30

## 2025-05-07 DIAGNOSIS — R59.0 LOCALIZED ENLARGED LYMPH NODES: ICD-10-CM

## 2025-05-09 ENCOUNTER — APPOINTMENT (OUTPATIENT)
Dept: INTERNAL MEDICINE | Facility: CLINIC | Age: 65
End: 2025-05-09

## 2025-05-09 VITALS
OXYGEN SATURATION: 95 % | RESPIRATION RATE: 18 BRPM | WEIGHT: 121 LBS | HEIGHT: 61.75 IN | DIASTOLIC BLOOD PRESSURE: 64 MMHG | HEART RATE: 81 BPM | TEMPERATURE: 98.2 F | BODY MASS INDEX: 22.26 KG/M2 | SYSTOLIC BLOOD PRESSURE: 100 MMHG

## 2025-05-16 ENCOUNTER — APPOINTMENT (OUTPATIENT)
Dept: RHEUMATOLOGY | Facility: CLINIC | Age: 65
End: 2025-05-16

## 2025-05-16 VITALS
WEIGHT: 125 LBS | OXYGEN SATURATION: 97 % | HEART RATE: 80 BPM | BODY MASS INDEX: 23.6 KG/M2 | DIASTOLIC BLOOD PRESSURE: 87 MMHG | RESPIRATION RATE: 16 BRPM | HEIGHT: 61 IN | SYSTOLIC BLOOD PRESSURE: 145 MMHG

## 2025-05-16 DIAGNOSIS — D86.9 SARCOIDOSIS, UNSPECIFIED: ICD-10-CM

## 2025-05-16 PROCEDURE — G2211 COMPLEX E/M VISIT ADD ON: CPT

## 2025-05-16 PROCEDURE — 99214 OFFICE O/P EST MOD 30 MIN: CPT

## 2025-05-17 LAB
ALBUMIN SERPL ELPH-MCNC: 4.2 G/DL
ALP BLD-CCNC: 143 U/L
ALT SERPL-CCNC: 41 U/L
ANION GAP SERPL CALC-SCNC: 13 MMOL/L
AST SERPL-CCNC: 22 U/L
BASOPHILS # BLD AUTO: 0.01 K/UL
BASOPHILS NFR BLD AUTO: 0.1 %
BILIRUB SERPL-MCNC: 0.5 MG/DL
BUN SERPL-MCNC: 21 MG/DL
CALCIUM SERPL-MCNC: 11.3 MG/DL
CHLORIDE SERPL-SCNC: 102 MMOL/L
CO2 SERPL-SCNC: 22 MMOL/L
CREAT SERPL-MCNC: 1.04 MG/DL
CRP SERPL-MCNC: 13 MG/L
EGFRCR SERPLBLD CKD-EPI 2021: 59 ML/MIN/1.73M2
EOSINOPHIL # BLD AUTO: 0.02 K/UL
EOSINOPHIL NFR BLD AUTO: 0.3 %
ERYTHROCYTE [SEDIMENTATION RATE] IN BLOOD BY WESTERGREN METHOD: 31 MM/HR
HCT VFR BLD CALC: 35.4 %
HGB BLD-MCNC: 11.6 G/DL
IMM GRANULOCYTES NFR BLD AUTO: 0.6 %
LYMPHOCYTES # BLD AUTO: 0.65 K/UL
LYMPHOCYTES NFR BLD AUTO: 9.7 %
MAN DIFF?: NORMAL
MCHC RBC-ENTMCNC: 31.4 PG
MCHC RBC-ENTMCNC: 32.8 G/DL
MCV RBC AUTO: 95.7 FL
MONOCYTES # BLD AUTO: 0.28 K/UL
MONOCYTES NFR BLD AUTO: 4.2 %
NEUTROPHILS # BLD AUTO: 5.71 K/UL
NEUTROPHILS NFR BLD AUTO: 85.1 %
PLATELET # BLD AUTO: 192 K/UL
POTASSIUM SERPL-SCNC: 5.5 MMOL/L
PROT SERPL-MCNC: 7.1 G/DL
RBC # BLD: 3.7 M/UL
RBC # FLD: 14.1 %
SODIUM SERPL-SCNC: 137 MMOL/L
WBC # FLD AUTO: 6.71 K/UL

## 2025-05-19 ENCOUNTER — APPOINTMENT (OUTPATIENT)
Dept: ENDOCRINOLOGY | Facility: CLINIC | Age: 65
End: 2025-05-19

## 2025-05-19 VITALS
WEIGHT: 127 LBS | SYSTOLIC BLOOD PRESSURE: 132 MMHG | OXYGEN SATURATION: 99 % | HEIGHT: 61 IN | HEART RATE: 63 BPM | DIASTOLIC BLOOD PRESSURE: 82 MMHG | BODY MASS INDEX: 23.98 KG/M2

## 2025-05-19 DIAGNOSIS — R42 DIZZINESS AND GIDDINESS: ICD-10-CM

## 2025-05-19 DIAGNOSIS — E83.52 HYPERCALCEMIA: ICD-10-CM

## 2025-05-19 DIAGNOSIS — E11.649 TYPE 2 DIABETES MELLITUS WITH HYPOGLYCEMIA W/OUT COMA: ICD-10-CM

## 2025-05-19 DIAGNOSIS — E55.9 VITAMIN D DEFICIENCY, UNSPECIFIED: ICD-10-CM

## 2025-05-19 DIAGNOSIS — I10 ESSENTIAL (PRIMARY) HYPERTENSION: ICD-10-CM

## 2025-05-19 DIAGNOSIS — E04.1 NONTOXIC SINGLE THYROID NODULE: ICD-10-CM

## 2025-05-19 DIAGNOSIS — R73.03 PREDIABETES.: ICD-10-CM

## 2025-05-19 DIAGNOSIS — E78.5 HYPERLIPIDEMIA, UNSPECIFIED: ICD-10-CM

## 2025-05-19 LAB — ACE BLD-CCNC: 153 U/L

## 2025-05-19 PROCEDURE — 95251 CONT GLUC MNTR ANALYSIS I&R: CPT

## 2025-05-19 PROCEDURE — 99214 OFFICE O/P EST MOD 30 MIN: CPT

## 2025-05-19 RX ORDER — BLOOD-GLUCOSE SENSOR
EACH MISCELLANEOUS
Qty: 6 | Refills: 3 | Status: ACTIVE | COMMUNITY
Start: 2025-05-19 | End: 1900-01-01

## 2025-05-19 RX ORDER — PANTOPRAZOLE 40 MG/1
40 TABLET, DELAYED RELEASE ORAL
Qty: 30 | Refills: 5 | Status: ACTIVE | COMMUNITY
Start: 2025-05-19 | End: 1900-01-01

## 2025-05-20 ENCOUNTER — RX RENEWAL (OUTPATIENT)
Age: 65
End: 2025-05-20

## 2025-05-23 ENCOUNTER — RX RENEWAL (OUTPATIENT)
Age: 65
End: 2025-05-23

## 2025-05-28 RX ORDER — PEN NEEDLE, DIABETIC 32GX 5/32"
32G X 4 MM NEEDLE, DISPOSABLE MISCELLANEOUS
Qty: 1 | Refills: 3 | Status: ACTIVE | COMMUNITY
Start: 2025-05-28 | End: 1900-01-01

## 2025-05-28 RX ORDER — INSULIN GLARGINE 100 [IU]/ML
100 INJECTION, SOLUTION SUBCUTANEOUS
Qty: 5 | Refills: 5 | Status: ACTIVE | COMMUNITY
Start: 2025-05-28 | End: 1900-01-01

## 2025-05-29 LAB
24R-OH-CALCIDIOL SERPL-MCNC: 81.3 PG/ML
25(OH)D3 SERPL-MCNC: 11.3 NG/ML
ANION GAP SERPL CALC-SCNC: 11 MMOL/L
BUN SERPL-MCNC: 25 MG/DL
CALCIUM SERPL-MCNC: 10.7 MG/DL
CALCIUM SERPL-MCNC: 10.7 MG/DL
CHLORIDE SERPL-SCNC: 99 MMOL/L
CO2 SERPL-SCNC: 25 MMOL/L
CREAT SERPL-MCNC: 1.09 MG/DL
EGFRCR SERPLBLD CKD-EPI 2021: 56 ML/MIN/1.73M2
ESTIMATED AVERAGE GLUCOSE: 169 MG/DL
GLUCOSE SERPL-MCNC: 175 MG/DL
HBA1C MFR BLD HPLC: 7.5 %
PARATHYROID HORMONE INTACT: 9 PG/ML
POTASSIUM SERPL-SCNC: 5.3 MMOL/L
SODIUM SERPL-SCNC: 135 MMOL/L

## 2025-05-30 ENCOUNTER — APPOINTMENT (OUTPATIENT)
Dept: PAIN MANAGEMENT | Facility: CLINIC | Age: 65
End: 2025-05-30
Payer: MEDICARE

## 2025-05-30 VITALS
BODY MASS INDEX: 23.98 KG/M2 | DIASTOLIC BLOOD PRESSURE: 80 MMHG | SYSTOLIC BLOOD PRESSURE: 130 MMHG | HEIGHT: 61 IN | WEIGHT: 127 LBS | HEART RATE: 81 BPM

## 2025-05-30 DIAGNOSIS — G89.4 CHRONIC PAIN SYNDROME: ICD-10-CM

## 2025-05-30 PROCEDURE — 99214 OFFICE O/P EST MOD 30 MIN: CPT

## 2025-06-02 ENCOUNTER — APPOINTMENT (OUTPATIENT)
Dept: PULMONOLOGY | Facility: CLINIC | Age: 65
End: 2025-06-02
Payer: MEDICARE

## 2025-06-02 VITALS
TEMPERATURE: 97.9 F | WEIGHT: 126 LBS | BODY MASS INDEX: 23.79 KG/M2 | DIASTOLIC BLOOD PRESSURE: 81 MMHG | HEART RATE: 86 BPM | SYSTOLIC BLOOD PRESSURE: 131 MMHG | HEIGHT: 61 IN | OXYGEN SATURATION: 98 %

## 2025-06-02 DIAGNOSIS — R06.83 SNORING: ICD-10-CM

## 2025-06-02 PROCEDURE — 99204 OFFICE O/P NEW MOD 45 MIN: CPT

## 2025-06-02 PROCEDURE — G2211 COMPLEX E/M VISIT ADD ON: CPT

## 2025-06-03 DIAGNOSIS — D86.9 SARCOIDOSIS, UNSPECIFIED: ICD-10-CM

## 2025-06-09 RX ORDER — ADALIMUMAB-ATTO 40 MG/.4ML
40 INJECTION SUBCUTANEOUS
Qty: 2 | Refills: 2 | Status: ACTIVE | COMMUNITY
Start: 2025-06-03

## 2025-06-13 ENCOUNTER — OUTPATIENT (OUTPATIENT)
Dept: OUTPATIENT SERVICES | Facility: HOSPITAL | Age: 65
LOS: 1 days | End: 2025-06-13

## 2025-06-13 ENCOUNTER — NON-APPOINTMENT (OUTPATIENT)
Age: 65
End: 2025-06-13

## 2025-06-13 ENCOUNTER — APPOINTMENT (OUTPATIENT)
Dept: INTERNAL MEDICINE | Facility: CLINIC | Age: 65
End: 2025-06-13

## 2025-06-13 DIAGNOSIS — Z41.9 ENCOUNTER FOR PROCEDURE FOR PURPOSES OTHER THAN REMEDYING HEALTH STATE, UNSPECIFIED: Chronic | ICD-10-CM

## 2025-06-18 ENCOUNTER — RESULT REVIEW (OUTPATIENT)
Age: 65
End: 2025-06-18

## 2025-06-18 ENCOUNTER — APPOINTMENT (OUTPATIENT)
Dept: MAMMOGRAPHY | Facility: IMAGING CENTER | Age: 65
End: 2025-06-18
Payer: MEDICARE

## 2025-06-18 ENCOUNTER — APPOINTMENT (OUTPATIENT)
Dept: ULTRASOUND IMAGING | Facility: IMAGING CENTER | Age: 65
End: 2025-06-18
Payer: MEDICARE

## 2025-06-18 ENCOUNTER — OUTPATIENT (OUTPATIENT)
Dept: OUTPATIENT SERVICES | Facility: HOSPITAL | Age: 65
LOS: 1 days | End: 2025-06-18
Payer: COMMERCIAL

## 2025-06-18 DIAGNOSIS — Z12.39 ENCOUNTER FOR OTHER SCREENING FOR MALIGNANT NEOPLASM OF BREAST: ICD-10-CM

## 2025-06-18 DIAGNOSIS — Z41.9 ENCOUNTER FOR PROCEDURE FOR PURPOSES OTHER THAN REMEDYING HEALTH STATE, UNSPECIFIED: Chronic | ICD-10-CM

## 2025-06-18 PROCEDURE — 76641 ULTRASOUND BREAST COMPLETE: CPT

## 2025-06-18 PROCEDURE — G0279: CPT | Mod: 26

## 2025-06-18 PROCEDURE — 76641 ULTRASOUND BREAST COMPLETE: CPT | Mod: 26,50

## 2025-06-18 PROCEDURE — 77066 DX MAMMO INCL CAD BI: CPT | Mod: 26

## 2025-06-18 PROCEDURE — 77066 DX MAMMO INCL CAD BI: CPT

## 2025-06-18 PROCEDURE — G0279: CPT

## 2025-07-01 ENCOUNTER — RX RENEWAL (OUTPATIENT)
Age: 65
End: 2025-07-01

## 2025-07-02 ENCOUNTER — APPOINTMENT (OUTPATIENT)
Dept: RHEUMATOLOGY | Facility: CLINIC | Age: 65
End: 2025-07-02

## 2025-07-02 VITALS
HEIGHT: 61 IN | SYSTOLIC BLOOD PRESSURE: 117 MMHG | OXYGEN SATURATION: 98 % | RESPIRATION RATE: 16 BRPM | HEART RATE: 94 BPM | BODY MASS INDEX: 24.17 KG/M2 | DIASTOLIC BLOOD PRESSURE: 82 MMHG | WEIGHT: 128 LBS

## 2025-07-02 PROCEDURE — G2211 COMPLEX E/M VISIT ADD ON: CPT

## 2025-07-02 PROCEDURE — 99214 OFFICE O/P EST MOD 30 MIN: CPT

## 2025-07-02 RX ADMIN — ADALIMUMAB-ATTO 0 MG/0.4ML: 40 INJECTION SUBCUTANEOUS at 00:00

## 2025-07-03 RX ORDER — ADALIMUMAB-ATTO 40 MG/.4ML
40 INJECTION SUBCUTANEOUS
Qty: 0 | Refills: 0 | Status: COMPLETED | OUTPATIENT
Start: 2025-07-02

## 2025-07-07 ENCOUNTER — RX RENEWAL (OUTPATIENT)
Age: 65
End: 2025-07-07

## 2025-07-09 ENCOUNTER — RX RENEWAL (OUTPATIENT)
Age: 65
End: 2025-07-09

## 2025-07-10 ENCOUNTER — RX RENEWAL (OUTPATIENT)
Age: 65
End: 2025-07-10

## 2025-07-17 ENCOUNTER — APPOINTMENT (OUTPATIENT)
Dept: SLEEP CENTER | Facility: CLINIC | Age: 65
End: 2025-07-17
Payer: MEDICARE

## 2025-07-17 PROCEDURE — 95800 SLP STDY UNATTENDED: CPT | Mod: 26
